# Patient Record
Sex: MALE | Race: WHITE | NOT HISPANIC OR LATINO | Employment: PART TIME | URBAN - NONMETROPOLITAN AREA
[De-identification: names, ages, dates, MRNs, and addresses within clinical notes are randomized per-mention and may not be internally consistent; named-entity substitution may affect disease eponyms.]

---

## 2017-03-14 ENCOUNTER — OFFICE VISIT - GICH (OUTPATIENT)
Dept: INTERNAL MEDICINE | Facility: OTHER | Age: 37
End: 2017-03-14

## 2017-03-14 ENCOUNTER — HISTORY (OUTPATIENT)
Dept: INTERNAL MEDICINE | Facility: OTHER | Age: 37
End: 2017-03-14

## 2017-03-14 DIAGNOSIS — Z79.899 OTHER LONG TERM (CURRENT) DRUG THERAPY: ICD-10-CM

## 2017-03-14 DIAGNOSIS — K21.9 GASTRO-ESOPHAGEAL REFLUX DISEASE WITHOUT ESOPHAGITIS: ICD-10-CM

## 2017-03-14 DIAGNOSIS — T75.3XXD MOTION SICKNESS, SUBSEQUENT ENCOUNTER: ICD-10-CM

## 2017-03-14 DIAGNOSIS — J30.81 ALLERGIC RHINITIS DUE TO ANIMAL HAIR AND DANDER: ICD-10-CM

## 2017-03-14 DIAGNOSIS — K51.919 ULCERATIVE COLITIS, UNSPECIFIED WITH UNSPECIFIED COMPLICATIONS (CODE): ICD-10-CM

## 2017-03-14 DIAGNOSIS — G47.9 SLEEP DISORDER: ICD-10-CM

## 2017-03-14 DIAGNOSIS — K51.00 ULCERATIVE CHRONIC PANCOLITIS WITHOUT COMPLICATIONS (H): ICD-10-CM

## 2017-03-14 DIAGNOSIS — F40.10 SOCIAL PHOBIA: ICD-10-CM

## 2017-03-14 DIAGNOSIS — I10 ESSENTIAL (PRIMARY) HYPERTENSION: ICD-10-CM

## 2017-03-14 DIAGNOSIS — F90.2 ATTENTION-DEFICIT HYPERACTIVITY DISORDER, COMBINED TYPE: ICD-10-CM

## 2017-03-14 DIAGNOSIS — J30.1 ALLERGIC RHINITIS DUE TO POLLEN: ICD-10-CM

## 2017-03-14 DIAGNOSIS — F33.42 MAJOR DEPRESSIVE DISORDER, RECURRENT, IN FULL REMISSION (H): ICD-10-CM

## 2017-03-14 DIAGNOSIS — J30.89 OTHER ALLERGIC RHINITIS: ICD-10-CM

## 2017-03-14 DIAGNOSIS — G43.009 MIGRAINE WITHOUT AURA AND WITHOUT STATUS MIGRAINOSUS, NOT INTRACTABLE: ICD-10-CM

## 2017-03-14 ASSESSMENT — ANXIETY QUESTIONNAIRES
5. BEING SO RESTLESS THAT IT IS HARD TO SIT STILL: NOT AT ALL
GAD7 TOTAL SCORE: 0
3. WORRYING TOO MUCH ABOUT DIFFERENT THINGS: NOT AT ALL
6. BECOMING EASILY ANNOYED OR IRRITABLE: NOT AT ALL
1. FEELING NERVOUS, ANXIOUS, OR ON EDGE: NOT AT ALL
4. TROUBLE RELAXING: NOT AT ALL
2. NOT BEING ABLE TO STOP OR CONTROL WORRYING: NOT AT ALL
7. FEELING AFRAID AS IF SOMETHING AWFUL MIGHT HAPPEN: NOT AT ALL

## 2017-03-14 ASSESSMENT — PATIENT HEALTH QUESTIONNAIRE - PHQ9: SUM OF ALL RESPONSES TO PHQ QUESTIONS 1-9: 0

## 2017-04-09 ENCOUNTER — HISTORY (OUTPATIENT)
Dept: EMERGENCY MEDICINE | Facility: OTHER | Age: 37
End: 2017-04-09

## 2017-04-21 ENCOUNTER — COMMUNICATION - GICH (OUTPATIENT)
Dept: PHARMACY | Facility: OTHER | Age: 37
End: 2017-04-21

## 2017-05-08 ENCOUNTER — HISTORY (OUTPATIENT)
Dept: FAMILY MEDICINE | Facility: OTHER | Age: 37
End: 2017-05-08

## 2017-05-08 ENCOUNTER — OFFICE VISIT - GICH (OUTPATIENT)
Dept: FAMILY MEDICINE | Facility: OTHER | Age: 37
End: 2017-05-08

## 2017-05-08 DIAGNOSIS — Z00.00 ENCOUNTER FOR GENERAL ADULT MEDICAL EXAMINATION WITHOUT ABNORMAL FINDINGS: ICD-10-CM

## 2017-05-08 LAB
BACTERIA URINE: NORMAL BACTERIA/HPF
BILIRUB UR QL: NEGATIVE
CLARITY, URINE: CLEAR CLARITY
COLOR UR: YELLOW COLOR
EPITHELIAL CELLS: NORMAL EPI/HPF
GLUCOSE URINE: NEGATIVE MG/DL
KETONES UR QL: NEGATIVE MG/DL
LEUKOCYTE ESTERASE URINE: NEGATIVE
NITRITE UR QL STRIP: NEGATIVE
OCCULT BLOOD,URINE - HISTORICAL: NEGATIVE
PH UR: 6 [PH]
PROTEIN QUALITATIVE,URINE - HISTORICAL: ABNORMAL MG/DL
RBC - HISTORICAL: NORMAL /HPF
SP GR UR STRIP: >=1.03
UROBILINOGEN,QUALITATIVE - HISTORICAL: NORMAL EU/DL
WBC - HISTORICAL: NORMAL /HPF

## 2017-05-08 ASSESSMENT — PATIENT HEALTH QUESTIONNAIRE - PHQ9: SUM OF ALL RESPONSES TO PHQ QUESTIONS 1-9: 0

## 2017-05-26 ENCOUNTER — COMMUNICATION - GICH (OUTPATIENT)
Dept: INTERNAL MEDICINE | Facility: OTHER | Age: 37
End: 2017-05-26

## 2017-05-26 DIAGNOSIS — K51.919 ULCERATIVE COLITIS, UNSPECIFIED WITH UNSPECIFIED COMPLICATIONS (CODE): ICD-10-CM

## 2017-06-09 ENCOUNTER — COMMUNICATION - GICH (OUTPATIENT)
Dept: INTERNAL MEDICINE | Facility: OTHER | Age: 37
End: 2017-06-09

## 2017-06-09 DIAGNOSIS — F40.10 SOCIAL PHOBIA: ICD-10-CM

## 2017-06-09 DIAGNOSIS — Z79.899 OTHER LONG TERM (CURRENT) DRUG THERAPY: ICD-10-CM

## 2017-06-09 DIAGNOSIS — F90.2 ATTENTION-DEFICIT HYPERACTIVITY DISORDER, COMBINED TYPE: ICD-10-CM

## 2017-06-23 ENCOUNTER — HISTORY (OUTPATIENT)
Dept: INTERNAL MEDICINE | Facility: OTHER | Age: 37
End: 2017-06-23

## 2017-06-23 ENCOUNTER — OFFICE VISIT - GICH (OUTPATIENT)
Dept: INTERNAL MEDICINE | Facility: OTHER | Age: 37
End: 2017-06-23

## 2017-06-23 DIAGNOSIS — K51.919 ULCERATIVE COLITIS, UNSPECIFIED WITH UNSPECIFIED COMPLICATIONS (CODE): ICD-10-CM

## 2017-06-23 DIAGNOSIS — F90.2 ATTENTION-DEFICIT HYPERACTIVITY DISORDER, COMBINED TYPE: ICD-10-CM

## 2017-06-23 DIAGNOSIS — F40.10 SOCIAL PHOBIA: ICD-10-CM

## 2017-06-23 DIAGNOSIS — F41.0 PANIC DISORDER WITHOUT AGORAPHOBIA: ICD-10-CM

## 2017-06-23 DIAGNOSIS — Z79.899 OTHER LONG TERM (CURRENT) DRUG THERAPY: ICD-10-CM

## 2017-06-23 DIAGNOSIS — L65.9 NONSCARRING HAIR LOSS: ICD-10-CM

## 2017-06-23 DIAGNOSIS — G43.009 MIGRAINE WITHOUT AURA AND WITHOUT STATUS MIGRAINOSUS, NOT INTRACTABLE: ICD-10-CM

## 2017-06-23 DIAGNOSIS — T75.3XXD MOTION SICKNESS, SUBSEQUENT ENCOUNTER: ICD-10-CM

## 2017-06-23 ASSESSMENT — ANXIETY QUESTIONNAIRES
7. FEELING AFRAID AS IF SOMETHING AWFUL MIGHT HAPPEN: NOT AT ALL
6. BECOMING EASILY ANNOYED OR IRRITABLE: NOT AT ALL
4. TROUBLE RELAXING: NOT AT ALL
5. BEING SO RESTLESS THAT IT IS HARD TO SIT STILL: NOT AT ALL
1. FEELING NERVOUS, ANXIOUS, OR ON EDGE: NOT AT ALL
2. NOT BEING ABLE TO STOP OR CONTROL WORRYING: NOT AT ALL
3. WORRYING TOO MUCH ABOUT DIFFERENT THINGS: NOT AT ALL
GAD7 TOTAL SCORE: 0

## 2017-06-23 ASSESSMENT — PATIENT HEALTH QUESTIONNAIRE - PHQ9: SUM OF ALL RESPONSES TO PHQ QUESTIONS 1-9: 0

## 2017-11-08 ENCOUNTER — HISTORY (OUTPATIENT)
Dept: EMERGENCY MEDICINE | Facility: OTHER | Age: 37
End: 2017-11-08

## 2017-12-28 NOTE — TELEPHONE ENCOUNTER
Patient Information     Patient Name MRN Kike Faria [id number] Male 1980      Telephone Encounter by Liliane Adams RN at 2017  1:57 PM     Author:  Liliane Adams RN Service:  (none) Author Type:  NURS- Registered Nurse     Filed:  2017  2:05 PM Encounter Date:  2017 Status:  Signed     :  Liliane Adams RN (NURS- Registered Nurse)            Needs appointment for refill of his adderall. Has appointment 17.  Patient states he is working 50 hour weeks at a new job, and it was only 2 weeks from now that he could get in to see Jarad Salcido MD  He is requesting enough to get by until then.  Also wondering if his father could pick it up, if it is ok'd, as he works from 0430 until 1900 daily.  Pt requests physician consideration and a callback today please   Unable to complete prescription refill per RN Medication Refill Policy.................... LILIANE ADAMS RN ....................  2017   1:58 PM

## 2017-12-28 NOTE — PROGRESS NOTES
Patient Information     Patient Name MRN Kike Faria [id number] Male 1980      Progress Notes by Jarad Salcido MD at 2017  3:00 PM     Author:  Jarad Salcido MD Service:  (none) Author Type:  Physician     Filed:  2017  4:54 PM Encounter Date:  2017 Status:  Signed     :  Jarad Salcido MD (Physician)            Nursing Notes:   Mariela Puentes  2017  3:09 PM  Signed  Patient presents to the clinic for medication management, last administration of Adderall was around 0720 today.    Mariela PuentesMARIA E        2017 2:44 PM    Kike Hess presents to clinic today for:   Chief Complaint    Patient presents with      Medication Management     HPI: Mr. Hess is a 37 y.o. male who presents today for evaluation of above.     (F90.2) Attention deficit hyperactivity disorder (ADHD), combined type  (primary encounter diagnosis)  (F40.10) Social phobia  (Z79.899) Controlled substance agreement signed - 2016  (L65.9) Hair loss  (F41.0) Panic attacks  (K51.919) Ulcerative colitis, quiescent, unspecified complication  (G43.009) Migraine without aura and without status migrainosus, not intractable  (T75.3XXD) Motion sickness, subsequent encounter     Patient presents for follow-up of multiple issues.  He has a diagnosis of ADHD.  He has been using dextroamphetamine for quite some time.  This has allowed him to work full-time and actually he is working 50-60 hours a week at this time.  He's been able to maintain a job and busy schedule because of his medication use.    Patient also has some issues with social phobia and panic attacks.  These are been quite well-controlled with his Ativan.  He tries to limit use and does not drive after using them.  [facility] website reviewed.  No abnormal findings noted.  Prescription reprinted ×3 months.    Hair loss, has to pay cash for his Propecia tablet.  He would like a refill of these.    Ulcerative colitis,  relatively well control at this time.  Continues to use his Ms. Sallie on a regular basis.  Intermittently needing Medrol.  Needs refills today.    Migraine headaches, relatively well controlled.  Needs refills of his Imitrex.    Motion sickness, uses scopolamine patches intermittently.  Would like a refill again today.    Mr. Thomass Body mass index is 26.89 kg/(m^2). This is out of the normal range for a 37 y.o. Normal range for ages 18+ is between 18.5 and 24.9. To lose weight we reviewed risks and benefits of appropriate options such as diet, exercise, and medications. Patient's strategy will be  self-directed nutrition plan and self-directed exercise program   BP Readings from Last 1 Encounters:06/23/17 : 124/70  Mr. Lemos blood pressure is out of the normal range for adults. Per JNC-8 guidelines normal adult blood pressure is < 120/80, pre-hypertensive is between 120/80 and 139/89, and hypertension is 140/90 or greater. Risks of hypertension were discussed. Patient's strategy will be weight loss, increased activity and reduced salt intake    Functional Capacity: > 4 METS.   Reports that he can climb a flight of stairs without any chest pain/heaviness or shortness of breath.   Patient reports no current symptoms of fevers, chills, nausea/vomiting.   No cough. No shortness of breath.   No change in bowel/bladder habits. No melena, hematochezia. No Hematuria.   No rashes. No palpitations.  No orthopnea/paroxysmal nocturnal dyspnea   No vision or hearing issues.   No significant mood issues   No bruising.     ANNMARIE:  ANNMARIE-7 ANXIETY SCREENING 3/14/2017 6/23/2017   ANNMARIE date (doc flow) 3/14/2017 6/23/2017   Nervous, anxious 0 0   Cannot stop worrying 0 0   Worry about different things 0 0   Cannot relax 0 0   Feeling restless 0 0   Easily annoyed/irritated 0 0   Afraid of awful event 0 0   Score 0 0   Severity none none       PHQ9:  PHQ Depression Screening 5/8/2017 6/23/2017   Date of PHQ exam (doc flow)  5/8/2017 6/23/2017   1. Lack of interest/pleasure 0 - Not at all 0 - Not at all   2. Feeling down/depressed 0 - Not at all 0 - Not at all   PHQ-2 TOTAL SCORE 0 0   3. Trouble sleeping 0 - Not at all 0 - Not at all   4. Decreased energy 0 - Not at all 0 - Not at all   5. Appetite change 0 - Not at all 0 - Not at all   6. Feelings of failure 0 - Not at all 0 - Not at all   7. Trouble concentrating 0 - Not at all 0 - Not at all   8. Activity level 0 - Not at all 0 - Not at all   9. Hurting yourself 0 - Not at all 0 - Not at all   PHQ-9 TOTAL SCORE 0 0   PHQ-9 Severity Level none none   Functional Impairment - not difficult at all        I have personally reviewed the past medical history, past surgical history, medications, allergies, family and social history as listed below, on 6/23/2017.    Patient Active Problem List      Diagnosis Date Noted     Attention deficit hyperactivity disorder (ADHD), combined type 12/01/2015     Anxiety 12/01/2015     Controlled substance agreement signed - 12/14/2016 12/01/2015     Migraine without aura and without status migrainosus, not intractable 06/19/2015     Sprain of left thumb 08/13/2014     Motion sickness 08/13/2014     Hypertension 08/13/2014     Social phobia 12/19/2013     Seasonal allergic rhinitis 12/10/2013     Dust allergy 12/10/2013     Allergic rhinitis due to cat hair 12/10/2013     GERD (gastroesophageal reflux disease) 12/10/2013     Sleeping difficulties 12/10/2013     Major depression - Followed by [facility] 12/10/2013     Hyperglycemia 12/10/2013     Hair loss 12/10/2013     Medial epicondylitis of elbow 12/10/2013     Lateral epicondylitis  of elbow 12/10/2013     Elevated liver enzymes 12/10/2013     Ulcerative colitis, unspecified 08/09/2007     Past Medical History:     Diagnosis  Date     Allergic rhinitis due to cat hair 12/10/2013     Depression 12/10/2013    Previously followed with Psychiatry - was on Remeron, Adderall, Lexapro in  the past. Awaiting to re-establish psychiatry care again.       Dust allergy 12/10/2013     GERD (gastroesophageal reflux disease) 12/10/2013     Migraine headache 12/10/2013     Seasonal allergic rhinitis 12/10/2013     Sleeping difficulties 12/10/2013     ULCERATIVE COLITIS 8/9/2007     Past Surgical History:      Procedure  Laterality Date     APPENDECTOMY  2/4/13    Dr. Givens/[facility]       Current Outpatient Prescriptions       Medication  Sig Dispense Refill     amitriptyline (ELAVIL) 25 mg tablet Take 2 tablets by mouth at bedtime. 60 tablet 11     amLODIPine (NORVASC) 5 mg tablet Take 1 tablet by mouth once daily. 30 tablet 11     dextroamphetamine-amphetamine (ADDERALL XR) 30 mg Extended-Release capsule Take 1 capsule by mouth once daily  - for on/after 08/18/17 31 capsule 0     dextroamphetamine-amphetamine (ADDERALL XR) 30 mg Extended-Release capsule Take 1 capsule by mouth once daily  -- for on or after 07/21/17 31 capsule 0     dextroamphetamine-amphetamine (ADDERALL XR) 30 mg Extended-Release capsule Take 1 capsule by mouth once daily  - for on or after 6/23/2017 31 capsule 0     escitalopram oxalate (LEXAPRO) 10 mg tablet Take 1 tablet by mouth once daily. 30 tablet 11     finasteride (PROPECIA) 1 mg tablet Take 1 tablet by mouth every morning. 90 tablet 3     LORazepam (ATIVAN) 0.5 mg tab Take 1 tablet by mouth 2 times daily if needed for Anxiety. - for on or after 6/23/2017  - 6 month supply 60 tablet 5     losartan (COZAAR) 50 mg tablet Take 1 tablet by mouth once daily. 30 tablet 11     mesalamine 400 mg DELAYED RELEASE (DELZICOL) 400 mg cpDR capsule TAKE TWO CAPSULES BY MOUTH 3 TO 4 TIMES DAILY 240 capsule 11     methylPREDNISolone (MEDROL) 4 mg tablet TAKE AS DIRECTED FOR 10 DAYS FOR ULCERATIVE COLITIS FLAIR. REPEAT EVERY 4 WEEKS IF NEEDED. MAXIMUM OF 40 TABLETS MONTHLY 40 tablet 5     mometasone (NASONEX) (50 mcg each actuation) nasal spray Inhale 1 Spray into both nostrils once daily. - use  generic covered nasal steroid 1 canister 11     omeprazole (PRILOSEC) 20 mg Delayed-Release capsule Take 1 capsule by mouth once daily before a meal. Take 30 to 60 minutes prior to 1st meal of the day 30 capsule 11     scopolamine 1.5mg (TRANSDERM SCOP) 1.5 mg (1 mg over 3 days) patch Apply 1 Patch on dry, clean, hairless skin every 72 hours. For Motion Sickness 10 Patch 11     SUMAtriptan (IMITREX) 50 mg tablet TAKE 1/2 TO 1 TABLET BY MOUTH EVERY 2 HOURS AS NEEDED FOR FOR MIGRAINE max DOSE 4 tablets PER 24 hours 15 tablet 11     Allergies      Allergen   Reactions     Cats (Fur, Dander, Saliva)  Runny Nose     Itchy eyes and hand swelling        Lisinopril  Cough     Other [Unlisted Allergen (Include Detail In Comments)]  Angioedema     Parsnip      Family History       Problem   Relation Age of Onset     Psychiatric illness        Aunt - suicide 2013       Cancer-colon  Paternal Grandmother      Cancer-colon  Maternal Grandmother      Family Status     Relation  Status     Mother Alive    obesity      Father Alive    DM2 - testicular CA, CAD s/p MI at about 58      Sister Alive     Paternal Grandfather Alive    CAD s/p MI      Paternal Aunt     suicide 2013      Paternal Grandmother Alive    colon CA      Maternal Grandmother Alive    colon CA      Social History     Social History        Marital status:  Single     Spouse name: N/A     Number of children:  N/A     Years of education:  N/A     Social History Main Topics         Smoking status:   Never Smoker     Smokeless tobacco:   Never Used     Alcohol use   0.0 oz/week     0 Standard drinks or equivalent per week        Comment: ocassionally       Drug use:   No     Sexual activity:   Not on file     Other Topics  Concern     Not on file      Social History Narrative     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     Recently was doing [employer] work     - minimal work as of  "February 2014, for past 1 month.    Moved back to [facility] in about November 2012.         For family mental health issues, he notes that an aunt committed suicide in    October 2013 and 3 maternal aunts and his mother are on antidepressants. His    maternal grandmother was diagnosed with schizoaffective disorder, as was a    male cousin. Attention deficit hyperactivity disorder was significant for an    uncle and the client's cousins.      Pertinent ROS was performed and was negative as noted in HPI above.     EXAM:   Vitals:     06/23/17 1446   BP: 124/70   Pulse: 88   Weight: 85 kg (187 lb 6 oz)   Height: 1.778 m (5' 10\")     BP Readings from Last 3 Encounters:    06/23/17 124/70   05/08/17 124/98   04/09/17 (!) 152/111     Wt Readings from Last 3 Encounters:    06/23/17 85 kg (187 lb 6 oz)   05/08/17 83.9 kg (185 lb)   04/09/17 86.2 kg (190 lb)     Estimated body mass index is 26.89 kg/(m^2) as calculated from the following:    Height as of this encounter: 1.778 m (5' 10\").    Weight as of this encounter: 85 kg (187 lb 6 oz).     EXAM:  Constitutional: Pleasant, alert, appropriate appearance for age. No acute distress  Lymphatic Exam: Non-palpable nodes in neck, clavicular regions  Pulmonary: Lungs are clear to auscultation bilaterally, without wheezes or crackles  Cardiovascular Exam: regular rate and rhythm, no pedal edema  Gastrointestinal Exam: Soft, non-tender, non-distended, positive bowel sounds  Integument: No abnormal rashes, sores, or ulcerations noted  Neurologic Exam: CN 2-12 grossly intact Nonfocal; symmetric DTRs, normal gross motor movement, tone, and coordination. No tremor.  Sensation intact to light touch in upper and lower extremities  Musculoskeletal Exam: Moves upper and lower extremities symmetrically, No focal weakness  Gait and station appear grossly normal  Psychiatric Exam: Awake and Alert, Affect and mood appropriate  Speech is fluent, Thought process is " normal    INVESTIGATIONS:  Results for orders placed or performed in visit on 05/08/17      URINALYSIS W REFLEX MICROSCOPIC IF POSITIVE      Result  Value Ref Range    COLOR                     Yellow Yellow Color    CLARITY                   Clear Clear Clarity    SPECIFIC GRAVITY,URINE    >=1.030 (A) 1.010, 1.015, 1.020, 1.025                    PH,URINE                  6.0 6.0, 7.0, 8.0, 5.5, 6.5, 7.5, 8.5                    UROBILINOGEN,QUALITATIVE  Normal Normal EU/dl    PROTEIN, URINE Trace (A) Negative mg/dL    GLUCOSE, URINE Negative Negative mg/dL    KETONES,URINE             Negative Negative mg/dL    BILIRUBIN,URINE           Negative Negative                    OCCULT BLOOD,URINE        Negative Negative                    NITRITE                  Negative Negative                    LEUKOCYTE ESTERASE        Negative Negative                   URINALYSIS MICROSCOPIC      Result  Value Ref Range    RBC 0-2 0-2, None Seen /HPF    WBC 3-5 0-2, 3-5, None Seen /HPF    BACTERIA                  Few None Seen, Rare, Occasional, Few Bacteria/HPF    EPITHELIAL CELLS          None Seen None Seen, Few Epi/HPF       ASSESSMENT AND PLAN:  Kike was seen today for medication management.    Diagnoses and all orders for this visit:    Attention deficit hyperactivity disorder (ADHD), combined type  -     dextroamphetamine-amphetamine (ADDERALL XR) 30 mg Extended-Release capsule; Take 1 capsule by mouth once daily  - for on/after 08/18/17  -     dextroamphetamine-amphetamine (ADDERALL XR) 30 mg Extended-Release capsule; Take 1 capsule by mouth once daily  -- for on or after 07/21/17  -     dextroamphetamine-amphetamine (ADDERALL XR) 30 mg Extended-Release capsule; Take 1 capsule by mouth once daily  - for on or after 6/23/2017    Social phobia  -     dextroamphetamine-amphetamine (ADDERALL XR) 30 mg Extended-Release capsule; Take 1 capsule by mouth once daily  - for on/after 08/18/17  -      dextroamphetamine-amphetamine (ADDERALL XR) 30 mg Extended-Release capsule; Take 1 capsule by mouth once daily  -- for on or after 07/21/17  -     dextroamphetamine-amphetamine (ADDERALL XR) 30 mg Extended-Release capsule; Take 1 capsule by mouth once daily  - for on or after 6/23/2017  -     LORazepam (ATIVAN) 0.5 mg tab; Take 1 tablet by mouth 2 times daily if needed for Anxiety. - for on or after 6/23/2017  - 6 month supply    Controlled substance agreement signed - 12/14/2016  -     dextroamphetamine-amphetamine (ADDERALL XR) 30 mg Extended-Release capsule; Take 1 capsule by mouth once daily  - for on/after 08/18/17  -     dextroamphetamine-amphetamine (ADDERALL XR) 30 mg Extended-Release capsule; Take 1 capsule by mouth once daily  -- for on or after 07/21/17  -     dextroamphetamine-amphetamine (ADDERALL XR) 30 mg Extended-Release capsule; Take 1 capsule by mouth once daily  - for on or after 6/23/2017  -     LORazepam (ATIVAN) 0.5 mg tab; Take 1 tablet by mouth 2 times daily if needed for Anxiety. - for on or after 6/23/2017  - 6 month supply    Hair loss  -     finasteride (PROPECIA) 1 mg tablet; Take 1 tablet by mouth every morning.    Panic attacks  -     LORazepam (ATIVAN) 0.5 mg tab; Take 1 tablet by mouth 2 times daily if needed for Anxiety. - for on or after 6/23/2017  - 6 month supply    Ulcerative colitis, quiescent, unspecified complication  -     methylPREDNISolone (MEDROL) 4 mg tablet; TAKE AS DIRECTED FOR 10 DAYS FOR ULCERATIVE COLITIS FLAIR. REPEAT EVERY 4 WEEKS IF NEEDED. MAXIMUM OF 40 TABLETS MONTHLY    Migraine without aura and without status migrainosus, not intractable  -     SUMAtriptan (IMITREX) 50 mg tablet; TAKE 1/2 TO 1 TABLET BY MOUTH EVERY 2 HOURS AS NEEDED FOR FOR MIGRAINE max DOSE 4 tablets PER 24 hours    Motion sickness, subsequent encounter  -     scopolamine 1.5mg (TRANSDERM SCOP) 1.5 mg (1 mg over 3 days) patch; Apply 1 Patch on dry, clean, hairless skin every 72 hours. For  Motion Sickness    lab results and schedule of future lab studies reviewed with patient, reviewed diet, exercise and weight control    -- Expected clinical course discussed   -- Medications and their side effects discussed    Urine toxicology screening obtained 12/14/2016, no abnormal findings noted.  Controlled substance agreement is up-to-date.    The ASCVD Risk score (Che TREJO Jr, et al., 2013) failed to calculate for the following reasons:    The 2013 ASCVD risk score is only valid for ages 40 to 79    Kkie is also recommended to eat a heart-healthy diet, do regular aerobic exercises, maintain a desirable body weight, and avoid tobacco products. These recommendations are from the American Heart Association (AHA) which stresses the importance of lifestyle changes to lower cardiovascular disease risk.     Return in about 3 months (around 9/23/2017) for -- medication refills.    Patient Instructions   Orthopedic referral information -- call and schedule appointment with orthopedics if your left knee begins to bother him more.  If a referral is needed, call me and we can send in an order.    Call [phone] or [phone])  Otherwise -- [phone]  - or -  [phone]     -- To schedule an appointment with the orthopedic clinic:   -- Dr. Galeana, Dr. Ramírez      Medications refilled.    Return in about 3 months for Pain Management appointment and medication refills (BEFORE YOU RUN OUT OF Controlled Medications), or sooner as needed for follow-up with Dr. Salcido.     Clinic : [phone]  Appointment line: [phone]      Jarad Salcido MD

## 2017-12-28 NOTE — PATIENT INSTRUCTIONS
Patient Information     Patient Name MRN Kike Faria [id number] Male 1980      Patient Instructions by Jarad Salcido MD at 2017  3:00 PM     Author:  Jarad Salcido MD  Service:  (none) Author Type:  Physician     Filed:  2017  3:14 PM  Encounter Date:  2017 Status:  Addendum     :  Jraad Salcido MD (Physician)        Related Notes: Original Note by Jarad Salcido MD (Physician) filed at 2017  3:11 PM            Orthopedic referral information -- call and schedule appointment with orthopedics if your left knee begins to bother him more.  If a referral is needed, call me and we can send in an order.    Call [phone] or [phone])  Otherwise -- [phone]  - or -  [phone]     -- To schedule an appointment with the orthopedic clinic:   -- Dr. Galeana, Dr. Ramírez      Medications refilled.    Return in about 3 months for Pain Management appointment and medication refills (BEFORE YOU RUN OUT OF Controlled Medications), or sooner as needed for follow-up with Dr. Salcido.     Clinic : [phone]  Appointment line: [phone]

## 2017-12-30 NOTE — NURSING NOTE
Patient Information     Patient Name MRN Kike Faria [id number] Male 1980      Nursing Note by Mariela Puentes at 2017  3:00 PM     Author:  Mariela Puentes Service:  (none) Author Type:  (none)     Filed:  2017  3:09 PM Encounter Date:  2017 Status:  Signed     :  Mariela Puentes            Patient presents to the clinic for medication management, last administration of Adderall was around 0720 today.    Mariela Puentes LPN        2017 2:44 PM

## 2018-01-03 ENCOUNTER — HISTORY (OUTPATIENT)
Dept: INTERNAL MEDICINE | Facility: OTHER | Age: 38
End: 2018-01-03

## 2018-01-03 ENCOUNTER — OFFICE VISIT - GICH (OUTPATIENT)
Dept: INTERNAL MEDICINE | Facility: OTHER | Age: 38
End: 2018-01-03

## 2018-01-03 DIAGNOSIS — K51.00 ULCERATIVE CHRONIC PANCOLITIS WITHOUT COMPLICATIONS (H): ICD-10-CM

## 2018-01-03 DIAGNOSIS — J30.81 ALLERGIC RHINITIS DUE TO ANIMAL HAIR AND DANDER: ICD-10-CM

## 2018-01-03 DIAGNOSIS — F90.2 ATTENTION-DEFICIT HYPERACTIVITY DISORDER, COMBINED TYPE: ICD-10-CM

## 2018-01-03 DIAGNOSIS — G47.9 SLEEP DISORDER: ICD-10-CM

## 2018-01-03 DIAGNOSIS — J30.89 OTHER ALLERGIC RHINITIS: ICD-10-CM

## 2018-01-03 DIAGNOSIS — K21.9 GASTRO-ESOPHAGEAL REFLUX DISEASE WITHOUT ESOPHAGITIS: ICD-10-CM

## 2018-01-03 DIAGNOSIS — F40.10 SOCIAL PHOBIA: ICD-10-CM

## 2018-01-03 DIAGNOSIS — F41.0 PANIC DISORDER WITHOUT AGORAPHOBIA: ICD-10-CM

## 2018-01-03 DIAGNOSIS — Z79.899 OTHER LONG TERM (CURRENT) DRUG THERAPY: ICD-10-CM

## 2018-01-03 DIAGNOSIS — I10 ESSENTIAL (PRIMARY) HYPERTENSION: ICD-10-CM

## 2018-01-03 DIAGNOSIS — F33.42 MAJOR DEPRESSIVE DISORDER, RECURRENT, IN FULL REMISSION (H): ICD-10-CM

## 2018-01-03 DIAGNOSIS — J30.1 ALLERGIC RHINITIS DUE TO POLLEN: ICD-10-CM

## 2018-01-03 ASSESSMENT — ANXIETY QUESTIONNAIRES
1. FEELING NERVOUS, ANXIOUS, OR ON EDGE: NOT AT ALL
GAD7 TOTAL SCORE: 0
7. FEELING AFRAID AS IF SOMETHING AWFUL MIGHT HAPPEN: NOT AT ALL
3. WORRYING TOO MUCH ABOUT DIFFERENT THINGS: NOT AT ALL
2. NOT BEING ABLE TO STOP OR CONTROL WORRYING: NOT AT ALL
6. BECOMING EASILY ANNOYED OR IRRITABLE: NOT AT ALL
4. TROUBLE RELAXING: NOT AT ALL
5. BEING SO RESTLESS THAT IT IS HARD TO SIT STILL: NOT AT ALL

## 2018-01-03 ASSESSMENT — PATIENT HEALTH QUESTIONNAIRE - PHQ9: SUM OF ALL RESPONSES TO PHQ QUESTIONS 1-9: 0

## 2018-01-03 NOTE — PATIENT INSTRUCTIONS
Patient Information     Patient Name MRN Kike Faria [id number] Male 1980      Patient Instructions by Jarad Salcido MD at 3/14/2017  3:50 PM     Author:  Jarad Salcido MD Service:  (none) Author Type:  Physician     Filed:  3/14/2017  4:05 PM Encounter Date:  3/14/2017 Status:  Signed     :  Jarad Salcido MD (Physician)            Medications refilled.     Glad you doing well with current medications.    Return in approximately 3 month(s), or sooner as needed for follow-up with Dr. Salcido.    Clinic : [phone]  Appointment line: [phone]

## 2018-01-03 NOTE — PROGRESS NOTES
Patient Information     Patient Name MRN Kike Faria [id number] Male 1980      Progress Notes by Jarad Salcido MD at 3/14/2017  3:50 PM     Author:  Jarad Salcido MD Service:  (none) Author Type:  Physician     Filed:  3/14/2017  6:55 PM Encounter Date:  3/14/2017 Status:  Signed     :  Jarad Salcido MD (Physician)            Nursing Notes:   Mariela Puentes  3/14/2017  4:00 PM  Signed  Patient presents to the clinic for medication management.  Last administration of Adderall was yesterday, last Ativan was within the past 1-2 hours.    As a proxy delegate, I have queried the [place] and/or [place] Prescription Monitoring Program for this patient and provided the clinician with a printed copy of the report for the preceding 12 months.    Mariela Puentes.......3/14/2017  3:51 PM    Kike Hess presents to clinic today for:   Chief Complaint    Patient presents with      Medication Management     HPI: Mr. Hess is a 36 y.o. male who presents today for evaluation of above.     (F90.2) Attention deficit hyperactivity disorder (ADHD), combined type  (primary encounter diagnosis)  (G47.9) Sleeping difficulties  (I10) Hypertension  (F40.10) Social phobia  (Z79.899) Controlled substance agreement signed - 2016  (F33.42) Recurrent major depressive disorder, in full remission (HC)  (K51.00) Ulcerative pancolitis without complication (HC)  (G43.009) Migraine without aura and without status migrainosus, not intractable  (T75.3XXD) Motion sickness, subsequent encounter  (K21.9) Gastroesophageal reflux disease without esophagitis  (K51.919) Ulcerative colitis, quiescent, unspecified complication  (J30.1) Seasonal allergic rhinitis due to pollen  (J30.89) Dust allergy  (J30.81) Allergic rhinitis due to cat hair     Patient presents for follow-up of multiple issues.  He needs numerous medications refilled today.  Last urine drug screen 2016 - noted as appropriate.  Controlled  substance agreement is currently up-to-date.  [facility] website reviewed.  No abnormal findings noted.  Proper medication use and misuse reviewed with patient.  He has been using medications appropriately.  Reports that occasionally he will go on a medication holiday and not take his Adderall for 1 or 2 days on the weekend if he is not working.  States that this way it is more effective for him when he does use it during the week.    Sleep difficulties, ongoing.  Needs refills of his medications.    Ativan, still intermittently using.  Needs refills.    Ulcerative colitis, doing well.  Has needed to use a couple doses of methylprednisolone in the past few months due to mild outbreaks of his ulcerative colitis issues.  States that they resolve quickly however.  Needs refills today.    Hypertension, currently well controlled.  Tolerating medication.  Refills today.    Motion sickness, refill scopolamine patches.  Still uses intermittently.    Reflex.  Reports stable.  Needs refills of omeprazole.    Allergic rhinitis, would like refills of Nasonex or similar nasal steroid spray.  Refills sent to pharmacy.    Mr. Hess's Body mass index is 27.41 kg/(m^2). This is out of the normal range for a 36 y.o. Normal range for ages 18-64 is between 18.5 and 24.9; normal range for ages 65+ is 23-30. To lose weight we reviewed risks and benefits of appropriate options such as diet, exercise, and medications. Patient's strategy will be  self-directed nutrition plan and self-directed exercise program   BP Readings from Last 1 Encounters:03/14/17 : 136/84  Mr. Hess's blood pressure is out of the normal range for adults. Per JNC-8 guidelines normal adult blood pressure is < 120/80, pre-hypertensive is between 120/80 and 139/89, and hypertension is 140/90 or greater. Risks of hypertension were discussed. Patient's strategy will be weight loss, increased activity and reduced salt intake    Functional Capacity: > 4 METS.   Reports  that he can climb a flight of stairs without any chest pain/heaviness or shortness of breath.   Patient reports no current symptoms of fevers, chills, nausea/vomiting.   No cough. No shortness of breath.   No change in bowel/bladder habits. No melena, hematochezia. No Hematuria.   No rashes. No palpitations.  No orthopnea/paroxysmal nocturnal dyspnea   No vision or hearing issues.   + ADHD is stable.   No bruising.     ANNMARIE:  ANNMARIE-7 ANXIETY SCREENING 3/14/2017   ANNMARIE date (doc flow) 3/14/2017   Nervous, anxious 0   Cannot stop worrying 0   Worry about different things 0   Cannot relax 0   Feeling restless 0   Easily annoyed/irritated 0   Afraid of awful event 0   Score 0   Severity none       PHQ9:  PHQ Depression Screening 12/14/2016 3/14/2017   Date of PHQ exam (doc flow) 12/14/2016 3/14/2017   1. Lack of interest/pleasure 1 - Several days 0 - Not at all   2. Feeling down/depressed 1 - Several days 0 - Not at all   PHQ-2 TOTAL SCORE 2 0   3. Trouble sleeping 1 - Several days 0 - Not at all   4. Decreased energy 2 - More than half the days 0 - Not at all   5. Appetite change 2 - More than half the days 0 - Not at all   6. Feelings of failure 1 - Several days 0 - Not at all   7. Trouble concentrating 1 - Several days 0 - Not at all   8. Activity level 0 - Not at all 0 - Not at all   9. Hurting yourself 0 - Not at all 0 - Not at all   PHQ-9 TOTAL SCORE 9 0   PHQ-9 Severity Level mild none   Functional Impairment somewhat difficult not difficult at all        I have personally reviewed the past medical history, past surgical history, medications, allergies, family and social history as listed below, on 3/14/2017.    Patient Active Problem List      Diagnosis Date Noted     Attention deficit hyperactivity disorder (ADHD), combined type 12/01/2015     Anxiety 12/01/2015     Controlled substance agreement signed - 12/14/2016 12/01/2015     Migraine without aura and without status migrainosus, not intractable 06/19/2015      Sprain of left thumb 08/13/2014     Motion sickness 08/13/2014     Hypertension 08/13/2014     Social phobia 12/19/2013     Seasonal allergic rhinitis 12/10/2013     Dust allergy 12/10/2013     Allergic rhinitis due to cat hair 12/10/2013     GERD (gastroesophageal reflux disease) 12/10/2013     Sleeping difficulties 12/10/2013     Major depression - Followed by [facility] 12/10/2013     Hyperglycemia 12/10/2013     Hair loss 12/10/2013     Medial epicondylitis of elbow 12/10/2013     Lateral epicondylitis  of elbow 12/10/2013     Elevated liver enzymes 12/10/2013     Ulcerative colitis, unspecified 08/09/2007     Past Medical History      Diagnosis   Date     Allergic rhinitis due to cat hair  12/10/2013     Depression  12/10/2013     Previously followed with Psychiatry - was on Remeron, Adderall, Lexapro in the past. Awaiting to re-establish psychiatry care again.       Dust allergy  12/10/2013     GERD (gastroesophageal reflux disease)  12/10/2013     Migraine headache  12/10/2013     Seasonal allergic rhinitis  12/10/2013     Sleeping difficulties  12/10/2013     ULCERATIVE COLITIS  8/9/2007     Past Surgical History       Procedure   Laterality Date     Appendectomy   2/4/13     Dr. Givens/[facility]       Current Outpatient Prescriptions       Medication  Sig Dispense Refill     albuterol HFA (PROAIR HFA) 90 mcg/actuation inhaler Inhale 1-2 Puffs by mouth every 2 hours if needed for Shortness Of Breath or Wheezing. 1 Inhaler 0     amitriptyline (ELAVIL) 25 mg tablet Take 2 tablets by mouth at bedtime. 60 tablet 11     amLODIPine (NORVASC) 5 mg tablet Take 1 tablet by mouth once daily. 30 tablet 11     dextroamphetamine-amphetamine (ADDERALL XR) 30 mg Extended-Release capsule Take 1 capsule by mouth once daily  -- for on or after 05/09/17 31 capsule 0     dextroamphetamine-amphetamine (ADDERALL XR) 30 mg Extended-Release capsule Take 1 capsule by mouth once daily  -- for on or after 04/11/17 31  capsule 0     dextroamphetamine-amphetamine (ADDERALL XR) 30 mg Extended-Release capsule Take 1 capsule by mouth once daily  - for on or after 3/14/2017 31 capsule 0     escitalopram oxalate (LEXAPRO) 10 mg tablet Take 1 tablet by mouth once daily. 30 tablet 11     finasteride (PROPECIA) 1 mg tablet Take 1 tablet by mouth every morning. 90 tablet 3     LORazepam (ATIVAN) 0.5 mg tab Take 1 tablet by mouth 2 times daily if needed for Anxiety. - for on or after 12/14/2016 - 6 month supply 60 tablet 5     losartan (COZAAR) 50 mg tablet Take 1 tablet by mouth once daily. 30 tablet 11     mesalamine 400 mg DELAYED RELEASE (DELZICOL) 400 mg cpDR capsule TAKE TWO CAPSULES BY MOUTH 3 TO 4 TIMES DAILY 240 capsule 11     methylPREDNISolone (MEDROL) 4 mg tablet TAKE AS DIRECTED FOR 10 DAYS FOR ULCERATIVE COLITIS FLAIR. REPEAT EVERY 4 WEEKS IF NEEDED. MAXIMUM OF 40 TABLETS MONTHLY 40 tablet 1     mometasone (NASONEX) (50 mcg each actuation) nasal spray Inhale 1 Spray into both nostrils once daily. - use generic covered nasal steroid 1 canister 11     omeprazole (PRILOSEC) 20 mg Delayed-Release capsule Take 1 capsule by mouth once daily before a meal. Take 30 to 60 minutes prior to 1st meal of the day 30 capsule 11     scopolamine 1.5mg (TRANSDERM SCOP) 1.5 mg (1 mg over 3 days) patch Apply 1 Patch on dry, clean, hairless skin every 72 hours. For Motion Sickness 10 Patch 11     SUMAtriptan (IMITREX) 50 mg tablet TAKE 1/2 TO 1 TABLET BY MOUTH EVERY 2 HOURS AS NEEDED FOR FOR MIGRAINE max DOSE 4 tablets PER 24 hours 15 tablet 11     Allergies      Allergen   Reactions     Cats (Fur, Dander, Saliva)  Runny Nose     Itchy eyes and hand swelling        Lisinopril  Cough     Other [Unlisted Allergen (Include Detail In Comments)]  Angioedema     Parsnip      Family History       Problem   Relation Age of Onset     Psychiatric illness        Aunt - suicide fall 2013       Cancer-colon  Paternal Grandmother      Cancer-colon  Maternal  "Grandmother      Family Status     Relation  Status     Mother Alive    obesity      Father Alive    DM2 - testicular CA, CAD s/p MI at about 58      Sister Alive     Paternal Grandfather Alive    CAD s/p MI      Paternal Aunt     suicide 2013      Paternal Grandmother Alive    colon CA      Maternal Grandmother Alive    colon CA      Social History     Social History        Marital status:  Single     Spouse name: N/A     Number of children:  N/A     Years of education:  N/A     Social History Main Topics         Smoking status:   Never Smoker     Smokeless tobacco:   Never Used     Alcohol use   0.0 oz/week     0 Standard drinks or equivalent per week        Comment: ocassionally       Drug use:   No     Sexual activity:   Not on file     Other Topics  Concern     Not on file      Social History Narrative     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     Recently was doing landscaping and carpentry work     - minimal work as of 2014, for past 1 month.    Moved back to [facility] in about 2012.         For family mental health issues, he notes that an aunt committed suicide in    2013 and 3 maternal aunts and his mother are on antidepressants. His    maternal grandmother was diagnosed with schizoaffective disorder, as was a    male cousin. Attention deficit hyperactivity disorder was significant for an    uncle and the client's cousins.        Pertinent ROS was performed and was negative as noted in HPI above.     EXAM:   Vitals:     17 1553   BP: 136/84   Pulse: 96   Temp: 98.6  F (37  C)   TempSrc: Tympanic   Weight: 86.6 kg (191 lb)   Height: 1.778 m (5' 10\")     BP Readings from Last 3 Encounters:    17 136/84   16 138/88   16 118/80     Wt Readings from Last 3 Encounters:    17 86.6 kg (191 lb)   16 95 kg (209 lb 6 oz)   16 86.6 kg (191 lb)     Estimated body mass index " "is 27.41 kg/(m^2) as calculated from the following:    Height as of this encounter: 1.778 m (5' 10\").    Weight as of this encounter: 86.6 kg (191 lb).     EXAM:  Constitutional: Pleasant, alert, appropriate appearance for age. No acute distress  Lymphatic Exam: Non-palpable nodes in neck, clavicular regions  Pulmonary: Lungs are clear to auscultation bilaterally, without wheezes or crackles  Cardiovascular Exam: regular rate and rhythm  Gastrointestinal Exam: Soft, non-tender, non-distended, positive bowel sounds  Integument: No abnormal rashes, sores, or ulcerations noted  Neurologic Exam: CN 3-12 grossly intact   Musculoskeletal Exam: Gait and station appear grossly normal  Psychiatric Exam: Awake and Alert, Affect and mood appropriate  Speech is fluent, Thought process is normal    INVESTIGATIONS:  Results for orders placed or performed in visit on 12/14/16      COMPLIANCE DRUG ANALYSIS      Result  Value Ref Range    6-MONOACETYL MORPHINE NEG NEG ng/mL    AMPHETAMINE URINE NEG <=500 ng/mL    BARBITURATE URINE NEG <=200 ng/mL    BENZODIAZEPINE URINE NEG <=200 ng/mL    BUPRENORPHRINE URINE NEG <=5 ng/mL    COCAINE METAB URINE NEG <=300 ng/mL    ETHYLGLUCURONIDE URINE NEG <=250 ng/mL    FENTANYL URINE NEG <=4 ng/mL    METHADONE URINE NEG <=300 ng/mL    OPIATES URINE NEG <=300 ng/mL    OXYCODONE URINE NEG <=100 ng/mL    PROPOXYPHENE URINE NEG <=300 ng/mL    THC 50 URINE NEG <=50 ng/mL    TRAMADOL NEG <=200 ng/mL    PH URINE 7.3 (H) 5.0 - 7.0    CREAT  >=20 mg/dL    MASS SPECTROMETRY URINE See Below        ASSESSMENT AND PLAN:  Kike was seen today for medication management.    Diagnoses and all orders for this visit:    Attention deficit hyperactivity disorder (ADHD), combined type  -     dextroamphetamine-amphetamine (ADDERALL XR) 30 mg Extended-Release capsule; Take 1 capsule by mouth once daily  -- for on or after 05/09/17  -     dextroamphetamine-amphetamine (ADDERALL XR) 30 mg Extended-Release capsule; " Take 1 capsule by mouth once daily  -- for on or after 04/11/17  -     dextroamphetamine-amphetamine (ADDERALL XR) 30 mg Extended-Release capsule; Take 1 capsule by mouth once daily  - for on or after 3/14/2017    Sleeping difficulties  -     amitriptyline (ELAVIL) 25 mg tablet; Take 2 tablets by mouth at bedtime.    Hypertension  -     amLODIPine (NORVASC) 5 mg tablet; Take 1 tablet by mouth once daily.  -     losartan (COZAAR) 50 mg tablet; Take 1 tablet by mouth once daily.    Social phobia  -     dextroamphetamine-amphetamine (ADDERALL XR) 30 mg Extended-Release capsule; Take 1 capsule by mouth once daily  -- for on or after 05/09/17  -     dextroamphetamine-amphetamine (ADDERALL XR) 30 mg Extended-Release capsule; Take 1 capsule by mouth once daily  -- for on or after 04/11/17  -     dextroamphetamine-amphetamine (ADDERALL XR) 30 mg Extended-Release capsule; Take 1 capsule by mouth once daily  - for on or after 3/14/2017    Controlled substance agreement signed - 12/14/2016  -     dextroamphetamine-amphetamine (ADDERALL XR) 30 mg Extended-Release capsule; Take 1 capsule by mouth once daily  -- for on or after 05/09/17  -     dextroamphetamine-amphetamine (ADDERALL XR) 30 mg Extended-Release capsule; Take 1 capsule by mouth once daily  -- for on or after 04/11/17  -     dextroamphetamine-amphetamine (ADDERALL XR) 30 mg Extended-Release capsule; Take 1 capsule by mouth once daily  - for on or after 3/14/2017    Recurrent major depressive disorder, in full remission (HC)  -     escitalopram oxalate (LEXAPRO) 10 mg tablet; Take 1 tablet by mouth once daily.    Ulcerative pancolitis without complication (HC)  -     mesalamine 400 mg DELAYED RELEASE (DELZICOL) 400 mg cpDR capsule; TAKE TWO CAPSULES BY MOUTH 3 TO 4 TIMES DAILY    Migraine without aura and without status migrainosus, not intractable  -     SUMAtriptan (IMITREX) 50 mg tablet; TAKE 1/2 TO 1 TABLET BY MOUTH EVERY 2 HOURS AS NEEDED FOR FOR MIGRAINE max  DOSE 4 tablets PER 24 hours    Motion sickness, subsequent encounter  -     scopolamine 1.5mg (TRANSDERM SCOP) 1.5 mg (1 mg over 3 days) patch; Apply 1 Patch on dry, clean, hairless skin every 72 hours. For Motion Sickness    Gastroesophageal reflux disease without esophagitis  -     omeprazole (PRILOSEC) 20 mg Delayed-Release capsule; Take 1 capsule by mouth once daily before a meal. Take 30 to 60 minutes prior to 1st meal of the day    Ulcerative colitis, quiescent, unspecified complication  -     methylPREDNISolone (MEDROL) 4 mg tablet; TAKE AS DIRECTED FOR 10 DAYS FOR ULCERATIVE COLITIS FLAIR. REPEAT EVERY 4 WEEKS IF NEEDED. MAXIMUM OF 40 TABLETS MONTHLY    Seasonal allergic rhinitis due to pollen  -     mometasone (NASONEX) (50 mcg each actuation) nasal spray; Inhale 1 Spray into both nostrils once daily. - use generic covered nasal steroid    Dust allergy  -     mometasone (NASONEX) (50 mcg each actuation) nasal spray; Inhale 1 Spray into both nostrils once daily. - use generic covered nasal steroid    Allergic rhinitis due to cat hair  -     mometasone (NASONEX) (50 mcg each actuation) nasal spray; Inhale 1 Spray into both nostrils once daily. - use generic covered nasal steroid      lab results and schedule of future lab studies reviewed with patient, reviewed diet, exercise and weight control, cardiovascular risk and specific lipid/LDL goals reviewed    -- Expected clinical course discussed   -- Medications and their side effects discussed    The ASCVD Risk score ([place] NEIL Jr., et al., 2013) failed to calculate for the following reasons:    The 2013 ASCVD risk score is only valid for ages 40 to 79    [place] is also recommended to eat a heart-healthy diet, do regular aerobic exercises, maintain a desirable body weight, and avoid tobacco products. These recommendations are from the American Heart Association (AHA) which stresses the importance of lifestyle changes to lower cardiovascular disease risk.      Return in about 3 months (around 6/14/2017) for - Adderall refill. .    Patient Instructions   Medications refilled.     Glad you doing well with current medications.    Return in approximately 3 month(s), or sooner as needed for follow-up with Dr. Salcido.    Clinic : [phone]  Appointment line: [phone]      Jarad Salcido MD

## 2018-01-03 NOTE — NURSING NOTE
Patient Information     Patient Name MRN Kike Faria [id number] Male 1980      Nursing Note by Mariela Puentes at 3/14/2017  3:50 PM     Author:  Mariela Puentes Service:  (none) Author Type:  (none)     Filed:  3/14/2017  4:00 PM Encounter Date:  3/14/2017 Status:  Signed     :  Mariela Puentes            Patient presents to the clinic for medication management.  Last administration of Adderall was yesterday, last Ativan was within the past 1-2 hours.    As a proxy delegate, I have queried the [place] and/or [place] Prescription Monitoring Program for this patient and provided the clinician with a printed copy of the report for the preceding 12 months.    Mariela Puentes.......3/14/2017  3:51 PM

## 2018-01-04 ENCOUNTER — COMMUNICATION - GICH (OUTPATIENT)
Dept: INTERNAL MEDICINE | Facility: OTHER | Age: 38
End: 2018-01-04

## 2018-01-04 DIAGNOSIS — J30.81 ALLERGIC RHINITIS DUE TO ANIMAL HAIR AND DANDER: ICD-10-CM

## 2018-01-04 DIAGNOSIS — J30.2 OTHER SEASONAL ALLERGIC RHINITIS: ICD-10-CM

## 2018-01-04 NOTE — TELEPHONE ENCOUNTER
Patient Information     Patient Name MRN Sex Kike Nowak [id number] Male 1980      Telephone Encounter by Manju Pugh RPh at 2017  4:42 PM     Author:  Manju Pugh RPh Service:  (none) Author Type:  PHARM- Pharmacist     Filed:  2017  4:43 PM Encounter Date:  2017 Status:  Signed     :  Manju Pugh RPh (PHARM- Pharmacist)            Mometasone nasal spray was switched to triamcinolone nasal spray per [facility] for insurance purposes.

## 2018-01-04 NOTE — TELEPHONE ENCOUNTER
Patient Information     Patient Name MRN Sex Kike Nowak [id number] Male 1980      Telephone Encounter by Jarad Salcido MD at 2017  6:26 PM     Author:  Jarad Salcido MD Service:  (none) Author Type:  Physician     Filed:  2017  6:26 PM Encounter Date:  2017 Status:  Signed     :  Jarad Salcido MD (Physician)            Noted.  Agree with plan as identified.  Jarad Salcido MD

## 2018-01-04 NOTE — PROGRESS NOTES
Patient Information     Patient Name MRN Kike Faria [id number] Male 1980      Progress Notes by Sujit Fonseca MD at 2017  7:45 AM     Author:  Sujit Fonseca MD Service:  (none) Author Type:  Physician     Filed:  2017  1:11 PM Encounter Date:  2017 Status:  Signed     :  Sujit Fonseca MD (Physician)            SUBJECTIVE:    Kike Hess is a 36 y.o. male who presents for DOT physical    HPI Comments: Patient arrives here for a DOT physical. Currently he does not have any specific concerns or complaints. Patient does have a history of hypertension that is being followed closely by his primary physician.      Allergies      Allergen   Reactions     Cats (Fur, Dander, Saliva)  Runny Nose     Itchy eyes and hand swelling        Lisinopril  Cough     Other [Unlisted Allergen (Include Detail In Comments)]  Angioedema     Parsnip    ,   Family History       Problem   Relation Age of Onset     Psychiatric illness        Aunt - suicide 2013       Cancer-colon  Paternal Grandmother      Cancer-colon  Maternal Grandmother     and   Current Outpatient Prescriptions on File Prior to Visit       Medication  Sig Dispense Refill     albuterol HFA (PROAIR HFA) 90 mcg/actuation inhaler Inhale 1-2 Puffs by mouth every 2 hours if needed for Shortness Of Breath or Wheezing. 1 Inhaler 0     amitriptyline (ELAVIL) 25 mg tablet Take 2 tablets by mouth at bedtime. 60 tablet 11     amLODIPine (NORVASC) 5 mg tablet Take 1 tablet by mouth once daily. 30 tablet 11     dextroamphetamine-amphetamine (ADDERALL XR) 30 mg Extended-Release capsule Take 1 capsule by mouth once daily  - for on or after 3/14/2017 31 capsule 0     dextroamphetamine-amphetamine (ADDERALL XR) 30 mg Extended-Release capsule Take 1 capsule by mouth once daily  -- for on or after 17 31 capsule 0     dextroamphetamine-amphetamine (ADDERALL XR) 30 mg Extended-Release capsule Take 1 capsule by mouth  "once daily  -- for on or after 04/11/17 31 capsule 0     escitalopram oxalate (LEXAPRO) 10 mg tablet Take 1 tablet by mouth once daily. 30 tablet 11     finasteride (PROPECIA) 1 mg tablet Take 1 tablet by mouth every morning. 90 tablet 3     LORazepam (ATIVAN) 0.5 mg tab Take 1 tablet by mouth 2 times daily if needed for Anxiety. - for on or after 12/14/2016 - 6 month supply 60 tablet 5     losartan (COZAAR) 50 mg tablet Take 1 tablet by mouth once daily. 30 tablet 11     mesalamine 400 mg DELAYED RELEASE (DELZICOL) 400 mg cpDR capsule TAKE TWO CAPSULES BY MOUTH 3 TO 4 TIMES DAILY 240 capsule 11     methylPREDNISolone (MEDROL) 4 mg tablet TAKE AS DIRECTED FOR 10 DAYS FOR ULCERATIVE COLITIS FLAIR. REPEAT EVERY 4 WEEKS IF NEEDED. MAXIMUM OF 40 TABLETS MONTHLY 40 tablet 1     mometasone (NASONEX) (50 mcg each actuation) nasal spray Inhale 1 Spray into both nostrils once daily. - use generic covered nasal steroid 1 canister 11     omeprazole (PRILOSEC) 20 mg Delayed-Release capsule Take 1 capsule by mouth once daily before a meal. Take 30 to 60 minutes prior to 1st meal of the day 30 capsule 11     scopolamine 1.5mg (TRANSDERM SCOP) 1.5 mg (1 mg over 3 days) patch Apply 1 Patch on dry, clean, hairless skin every 72 hours. For Motion Sickness 10 Patch 11     SUMAtriptan (IMITREX) 50 mg tablet TAKE 1/2 TO 1 TABLET BY MOUTH EVERY 2 HOURS AS NEEDED FOR FOR MIGRAINE max DOSE 4 tablets PER 24 hours 15 tablet 11     No current facility-administered medications on file prior to visit.        REVIEW OF SYSTEMS:  ROS    OBJECTIVE:  /98  Pulse 88  Ht 1.778 m (5' 10\")  Wt 83.9 kg (185 lb)  BMI 26.54 kg/m2    EXAM:   Physical Exam   Constitutional: He is well-developed, well-nourished, and in no distress.   HENT:   Right Ear: External ear normal.   Left Ear: External ear normal.   Mouth/Throat: No oropharyngeal exudate.   Eyes: Conjunctivae and EOM are normal. Pupils are equal, round, and reactive to light.   Neck: Normal " range of motion.   Cardiovascular: Normal rate and regular rhythm.    No murmur heard.  Pulmonary/Chest: Effort normal.   Abdominal: Soft.   Genitourinary: Penis normal.   Genitourinary Comments: No hernia   Musculoskeletal: Normal range of motion.   Neurological: He is alert.   Skin: Skin is warm.   Psychiatric: Affect normal.       ASSESSMENT/PLAN:    ICD-10-CM    1. Preventative health care Z00.00 URINALYSIS W REFLEX MICROSCOPIC IF POSITIVE      URINALYSIS W REFLEX MICROSCOPIC IF POSITIVE      URINALYSIS MICROSCOPIC      URINALYSIS MICROSCOPIC        Plan:  Blood pressures have recently been satisfactory in the past. One-year certificate given. Follow up with his primary physician.

## 2018-01-05 NOTE — TELEPHONE ENCOUNTER
Patient Information     Patient Name MRN Sex Kike Nowak [id number] Male 1980      Telephone Encounter by Liliane Gomes RN at 2017  3:14 PM     Author:  Liliane Gomes RN Service:  (none) Author Type:  NURS- Registered Nurse     Filed:  2017  3:16 PM Encounter Date:  2017 Status:  Signed     :  Liliane Gomes RN (NURS- Registered Nurse)            Medication is not on refill protocol. Unable to complete prescription refill per RN Medication Refill Policy.................... LILIANE GOMES RN ....................  2017   3:15 PM        This is a Refill request from: [facility]  Name of Medication: Methylprednisolone 4 mg tablet  Quantity requested: 40  Last fill date: 17  Last visit with LISANDRA SALCIDO was on: 2017 in [facility]  PCP:  Lisandra Salcido MD  Controlled Substance Agreement:  na   Diagnosis r/t this medication request: ulcerative colitis

## 2018-01-18 ENCOUNTER — OFFICE VISIT - GICH (OUTPATIENT)
Dept: FAMILY MEDICINE | Facility: OTHER | Age: 38
End: 2018-01-18

## 2018-01-18 ENCOUNTER — HISTORY (OUTPATIENT)
Dept: FAMILY MEDICINE | Facility: OTHER | Age: 38
End: 2018-01-18

## 2018-01-18 DIAGNOSIS — H57.89 OTHER SPECIFIED DISORDERS OF EYE AND ADNEXA: ICD-10-CM

## 2018-01-18 DIAGNOSIS — Z23 ENCOUNTER FOR IMMUNIZATION: ICD-10-CM

## 2018-01-22 ENCOUNTER — DOCUMENTATION ONLY (OUTPATIENT)
Dept: FAMILY MEDICINE | Facility: OTHER | Age: 38
End: 2018-01-22

## 2018-01-22 RX ORDER — ESCITALOPRAM OXALATE 10 MG/1
10 TABLET ORAL DAILY
COMMUNITY
Start: 2018-01-03 | End: 2018-04-04

## 2018-01-22 RX ORDER — LOSARTAN POTASSIUM 50 MG/1
50 TABLET ORAL DAILY
COMMUNITY
Start: 2018-01-03 | End: 2018-04-04

## 2018-01-22 RX ORDER — MESALAMINE 400 MG/1
2 CAPSULE, DELAYED RELEASE ORAL
COMMUNITY
Start: 2018-01-03 | End: 2018-04-04

## 2018-01-22 RX ORDER — DEXTROAMPHETAMINE SACCHARATE, AMPHETAMINE ASPARTATE MONOHYDRATE, DEXTROAMPHETAMINE SULFATE AND AMPHETAMINE SULFATE 7.5; 7.5; 7.5; 7.5 MG/1; MG/1; MG/1; MG/1
30 CAPSULE, EXTENDED RELEASE ORAL
COMMUNITY
Start: 2018-01-03 | End: 2018-02-02

## 2018-01-22 RX ORDER — LORAZEPAM 0.5 MG/1
0.5 TABLET ORAL 2 TIMES DAILY PRN
COMMUNITY
Start: 2018-01-03 | End: 2018-04-04

## 2018-01-22 RX ORDER — DEXTROAMPHETAMINE SACCHARATE, AMPHETAMINE ASPARTATE MONOHYDRATE, DEXTROAMPHETAMINE SULFATE AND AMPHETAMINE SULFATE 7.5; 7.5; 7.5; 7.5 MG/1; MG/1; MG/1; MG/1
30 CAPSULE, EXTENDED RELEASE ORAL
COMMUNITY
Start: 2018-02-02 | End: 2018-03-04

## 2018-01-22 RX ORDER — METHYLPREDNISOLONE 4 MG/1
TABLET ORAL
COMMUNITY
Start: 2017-06-23 | End: 2018-08-22

## 2018-01-22 RX ORDER — DEXTROAMPHETAMINE SACCHARATE, AMPHETAMINE ASPARTATE MONOHYDRATE, DEXTROAMPHETAMINE SULFATE AND AMPHETAMINE SULFATE 7.5; 7.5; 7.5; 7.5 MG/1; MG/1; MG/1; MG/1
30 CAPSULE, EXTENDED RELEASE ORAL
COMMUNITY
Start: 2018-03-04 | End: 2018-04-04

## 2018-01-22 RX ORDER — SUMATRIPTAN 50 MG/1
.5-1 TABLET, FILM COATED ORAL
COMMUNITY
Start: 2017-06-23 | End: 2018-04-04

## 2018-01-22 RX ORDER — MOMETASONE FUROATE MONOHYDRATE 50 UG/1
1 SPRAY, METERED NASAL DAILY
COMMUNITY
Start: 2018-01-03 | End: 2018-04-04

## 2018-01-22 RX ORDER — DEXTROAMPHETAMINE SACCHARATE, AMPHETAMINE ASPARTATE MONOHYDRATE, DEXTROAMPHETAMINE SULFATE AND AMPHETAMINE SULFATE 7.5; 7.5; 7.5; 7.5 MG/1; MG/1; MG/1; MG/1
30 CAPSULE, EXTENDED RELEASE ORAL
COMMUNITY
Start: 2017-06-23 | End: 2018-04-04

## 2018-01-22 RX ORDER — SCOLOPAMINE TRANSDERMAL SYSTEM 1 MG/1
1 PATCH, EXTENDED RELEASE TRANSDERMAL
COMMUNITY
Start: 2017-06-23 | End: 2018-04-04

## 2018-01-22 RX ORDER — AMLODIPINE BESYLATE 5 MG/1
1 TABLET ORAL DAILY
COMMUNITY
Start: 2018-01-03 | End: 2018-04-04

## 2018-01-26 VITALS
DIASTOLIC BLOOD PRESSURE: 70 MMHG | HEIGHT: 70 IN | SYSTOLIC BLOOD PRESSURE: 124 MMHG | BODY MASS INDEX: 26.82 KG/M2 | WEIGHT: 187.38 LBS | HEART RATE: 88 BPM

## 2018-01-26 VITALS
SYSTOLIC BLOOD PRESSURE: 136 MMHG | WEIGHT: 191 LBS | HEIGHT: 70 IN | TEMPERATURE: 98.6 F | DIASTOLIC BLOOD PRESSURE: 84 MMHG | HEART RATE: 96 BPM | BODY MASS INDEX: 27.35 KG/M2

## 2018-01-26 VITALS
HEART RATE: 88 BPM | WEIGHT: 185 LBS | BODY MASS INDEX: 26.48 KG/M2 | DIASTOLIC BLOOD PRESSURE: 98 MMHG | HEIGHT: 70 IN | SYSTOLIC BLOOD PRESSURE: 124 MMHG

## 2018-01-29 ASSESSMENT — ANXIETY QUESTIONNAIRES
GAD7 TOTAL SCORE: 0
GAD7 TOTAL SCORE: 0

## 2018-01-29 ASSESSMENT — PATIENT HEALTH QUESTIONNAIRE - PHQ9
SUM OF ALL RESPONSES TO PHQ QUESTIONS 1-9: 0

## 2018-02-09 VITALS
BODY MASS INDEX: 29.96 KG/M2 | HEART RATE: 88 BPM | DIASTOLIC BLOOD PRESSURE: 88 MMHG | TEMPERATURE: 98.6 F | SYSTOLIC BLOOD PRESSURE: 132 MMHG | WEIGHT: 208.8 LBS

## 2018-02-09 VITALS
HEIGHT: 70 IN | DIASTOLIC BLOOD PRESSURE: 72 MMHG | HEART RATE: 92 BPM | BODY MASS INDEX: 28.1 KG/M2 | WEIGHT: 196.25 LBS | SYSTOLIC BLOOD PRESSURE: 120 MMHG

## 2018-02-10 ASSESSMENT — ANXIETY QUESTIONNAIRES: GAD7 TOTAL SCORE: 0

## 2018-02-10 ASSESSMENT — PATIENT HEALTH QUESTIONNAIRE - PHQ9: SUM OF ALL RESPONSES TO PHQ QUESTIONS 1-9: 0

## 2018-02-12 NOTE — NURSING NOTE
Patient Information     Patient Name MRN Kike Faria [id number] Male 1980      Nursing Note by Mariela Puentes at 1/3/2018  9:40 AM     Author:  Mariela Puentes Service:  (none) Author Type:  (none)     Filed:  1/3/2018 10:05 AM Encounter Date:  1/3/2018 Status:  Signed     :  Mariela Puentes            Patient presents to the clinic for medication refill, last administration of Adderall was 3 days ago due to running out.      Mariela Puentes LPN        1/3/2018 9:57 AM

## 2018-02-12 NOTE — PATIENT INSTRUCTIONS
Patient Information     Patient Name MRN Kike Faria [id number] Male 1980      Patient Instructions by Jarad Salcido MD at 1/3/2018  9:40 AM     Author:  Jarad Salcido MD  Service:  (none) Author Type:  Physician     Filed:  1/3/2018 10:11 AM  Encounter Date:  1/3/2018 Status:  Addendum     :  Jarad Salcido MD (Physician)        Related Notes: Original Note by Jarad Salcido MD (Physician) filed at 1/3/2018 10:11 AM            Glad you are doing well with current medication regimen.     Blood pressures are well controlled.     Pleasure allergy symptoms aren't doing well with current medication regimen.    --> Continue Prilosec for heartburn -- use minimum dose for heartburn.     -- consider trying Zantac or Pepcid -- (use generic), up to twice daily if needed for heartburn.     === Return for Med Management appointment and Med refills === before running out of Controlled medications.                Bring pill bottle(s) and any remaining controlled substance meds to EACH appointment for pill counts.

## 2018-02-12 NOTE — PROGRESS NOTES
Patient Information     Patient Name MRN Sex Kike Nowak [id number] Male 1980      Progress Notes by Jarad Salcido MD at 1/3/2018  9:40 AM     Author:  Jarad Salcido MD Service:  (none) Author Type:  Physician     Filed:  1/3/2018 10:31 AM Encounter Date:  1/3/2018 Status:  Signed     :  Jarad Salcido MD (Physician)            Nursing Notes:   Mariela Puentes  1/3/2018 10:05 AM  Signed  Patient presents to the clinic for medication refill, last administration of Adderall was 3 days ago due to running out.      Mariela Puentes LPN        1/3/2018 9:57 AM    Kike Hess presents to clinic today for:   Chief Complaint    Patient presents with      Medication Management     HPI: Mr. Hess is a 37 y.o. male who presents today for evaluation of above.     (F90.2) Attention deficit hyperactivity disorder (ADHD), combined type  (primary encounter diagnosis)  (G47.9) Sleeping difficulties  (I10) Hypertension  (F33.42) Recurrent major depressive disorder, in full remission (HC)  (Z79.899) Controlled substance agreement signed - 1/3/2018  (F40.10) Social phobia  (F41.0) Panic attacks  (K51.00) Ulcerative pancolitis without complication (HC)  (J30.1) Seasonal allergic rhinitis due to pollen, unspecified chronicity  (J30.89) Dust allergy  (J30.81) Allergic rhinitis due to cat hair  (K21.9) Gastroesophageal reflux disease without esophagitis     Attention deficit disorder, chronic.  Doing well with Adderall XR.  He takes 1 tablet daily.  30 mg.  He will take medication holidays on the weekends at times, also on occasion in the summertime when he is not working he will skip the Adderall states that overall it is been doing quite well for him.  Helps him stay focused on his job.  He has had problems for many years.  Controlled substance agreement updated today.  [facility] website reviewed.  No abnormal findings noted.  He does take lorazepam.  No pain medication.    Sleep difficulties,  chronic.  Ativan does help with this.  Amitriptyline quite helpful.  Needs refills.    Hypertension, currently well controlled.  Tolerating medication.  Needs refills today.    History of depression, currently controlled.  Taking Lexapro.  Needs refills.    Social phobia and panic attacks, using Ativan intermittently.  Lexapro has helped it as well.    Ulcerative colitis, currently stable.  Has not used been using much oral steroids/Medrol lately.  Doing quite well with his alanine as well as dietary avoidance of certain foods.  Needs refills today.  Allergic rhinitis, stable.  Using Nasonex intermittently.  Needs refills today.    Heartburn and reflux, we discussed reducing use of Prilosec.  He was using it daily but states he only really needs to use it maybe every 2 or 3 days, less often if he watches his diet more closely.  He does use Zantac or ranitidine intermittently.  Refills sent to pharmacy.    Mr. Lemos Body mass index is 28.36 kg/(m^2). This is out of the normal range for a 37 y.o. Normal range for ages 18+ is between 18.5 and 24.9. To lose weight we reviewed risks and benefits of appropriate options such as diet, exercise, and medications. Patient's strategy will be  self-directed nutrition plan and self-directed exercise program   BP Readings from Last 1 Encounters:01/03/18 : 120/72  Mr. Hess's blood pressure is out of the normal range for adults. Per JNC-8 guidelines normal adult blood pressure is < 120/80, pre-hypertensive is between 120/80 and 139/89, and hypertension is 140/90 or greater. Risks of hypertension were discussed. Patient's strategy will be weight loss, increased activity and reduced salt intake    Functional Capacity: > 4 METS.   Reports that he can climb a flight of stairs without any chest pain/heaviness or shortness of breath.   Patient reports no current symptoms of fevers, chills, nausea/vomiting.   No cough. No shortness of breath.   No change in bowel/bladder  habits. No melena, hematochezia. No Hematuria.   No rashes. No palpitations.  No orthopnea/paroxysmal nocturnal dyspnea   No vision or hearing issues.   No significant mood issues   No bruising.     ANNMARIE:  ANNMARIE-7 ANXIETY SCREENING 6/23/2017 1/3/2018   ANNMARIE date (doc flow) 6/23/2017 1/3/2018   Nervous, anxious 0 0   Cannot stop worrying 0 0   Worry about different things 0 0   Cannot relax 0 0   Feeling restless 0 0   Easily annoyed/irritated 0 0   Afraid of awful event 0 0   Score 0 0   Severity none none   Some recent data might be hidden         PHQ9:  PHQ Depression Screening 6/23/2017 1/3/2018   Date of PHQ exam (doc flow) 6/23/2017 1/3/2018   1. Lack of interest/pleasure 0 - Not at all 0 - Not at all   2. Feeling down/depressed 0 - Not at all 0 - Not at all   PHQ-2 TOTAL SCORE 0 0   3. Trouble sleeping 0 - Not at all 0 - Not at all   4. Decreased energy 0 - Not at all 0 - Not at all   5. Appetite change 0 - Not at all 0 - Not at all   6. Feelings of failure 0 - Not at all 0 - Not at all   7. Trouble concentrating 0 - Not at all 0 - Not at all   8. Activity level 0 - Not at all 0 - Not at all   9. Hurting yourself 0 - Not at all 0 - Not at all   PHQ-9 TOTAL SCORE 0 0   PHQ-9 Severity Level none none   Functional Impairment not difficult at all not difficult at all   Some recent data might be hidden          I have personally reviewed the past medical history, past surgical history, medications, allergies, family and social history as listed below, on 1/3/2018.    Patient Active Problem List      Diagnosis Date Noted     Attention deficit hyperactivity disorder (ADHD), combined type 12/01/2015     Anxiety 12/01/2015     Controlled substance agreement signed - 1/3/2018 12/01/2015     Migraine without aura and without status migrainosus, not intractable 06/19/2015     Sprain of left thumb 08/13/2014     Motion sickness 08/13/2014     Hypertension 08/13/2014     Social phobia 12/19/2013     Seasonal allergic rhinitis  12/10/2013     Dust allergy 12/10/2013     Allergic rhinitis due to cat hair 12/10/2013     GERD (gastroesophageal reflux disease) 12/10/2013     Sleeping difficulties 12/10/2013     Major depression - Followed by [facility] 12/10/2013     Hyperglycemia 12/10/2013     Hair loss 12/10/2013     Medial epicondylitis of elbow 12/10/2013     Lateral epicondylitis  of elbow 12/10/2013     Elevated liver enzymes 12/10/2013     Ulcerative colitis, unspecified 08/09/2007     Past Medical History:     Diagnosis  Date     Allergic rhinitis due to cat hair 12/10/2013     Depression 12/10/2013    Previously followed with Psychiatry - was on Remeron, Adderall, Lexapro in the past. Awaiting to re-establish psychiatry care again.       Dust allergy 12/10/2013     GERD (gastroesophageal reflux disease) 12/10/2013     Migraine headache 12/10/2013     Seasonal allergic rhinitis 12/10/2013     Sleeping difficulties 12/10/2013     ULCERATIVE COLITIS 8/9/2007     Past Surgical History:      Procedure  Laterality Date     APPENDECTOMY  2/4/13    Dr. Givens/[facility]       Current Outpatient Prescriptions       Medication  Sig Dispense Refill     amitriptyline (ELAVIL) 25 mg tablet Take 2 tablets by mouth at bedtime. 60 tablet 11     amLODIPine (NORVASC) 5 mg tablet Take 1 tablet by mouth once daily. 30 tablet 11     dextroamphetamine-amphetamine (ADDERALL XR) 30 mg Extended-Release capsule Take 1 capsule by mouth once daily  Earliest Fill Date: 1/3/18 30 capsule 0     [START ON 2/2/2018] dextroamphetamine-amphetamine (ADDERALL XR) 30 mg Extended-Release capsule Take 1 capsule by mouth once daily  Earliest Fill Date: 2/1/18 30 capsule 0     [START ON 3/4/2018] dextroamphetamine-amphetamine (ADDERALL XR) 30 mg Extended-Release capsule Take 1 capsule by mouth once daily  Earliest Fill Date: 3/3/18 30 capsule 0     dextroamphetamine-amphetamine (ADDERALL XR) 30 mg Extended-Release capsule Take 1 capsule by mouth once daily  -  for on or after 6/23/2017 31 capsule 0     escitalopram oxalate (LEXAPRO) 10 mg tablet Take 1 tablet by mouth once daily. 30 tablet 11     LORazepam (ATIVAN) 0.5 mg tab Take 1 tablet by mouth 2 times daily if needed for Anxiety. - for on or after 6/23/2017  - 6 month supply 60 tablet 5     losartan (COZAAR) 50 mg tablet Take 1 tablet by mouth once daily. 30 tablet 11     mesalamine 400 mg DELAYED RELEASE (DELZICOL) 400 mg cpDR capsule TAKE TWO CAPSULES BY MOUTH 3 TO 4 TIMES DAILY 240 capsule 11     methylPREDNISolone (MEDROL) 4 mg tablet TAKE AS DIRECTED FOR 10 DAYS FOR ULCERATIVE COLITIS FLAIR. REPEAT EVERY 4 WEEKS IF NEEDED. MAXIMUM OF 40 TABLETS MONTHLY 40 tablet 5     mometasone (NASONEX) (50 mcg each actuation) nasal spray Inhale 1 Spray into both nostrils once daily. - use generic covered nasal steroid 1 canister 11     omeprazole (PRILOSEC) 20 mg Delayed-Release capsule Take 1 capsule by mouth once daily before a meal. Take 30 to 60 minutes prior to 1st meal of the day 30 capsule 11     scopolamine 1.5mg (TRANSDERM SCOP) 1.5 mg (1 mg over 3 days) patch Apply 1 Patch on dry, clean, hairless skin every 72 hours. For Motion Sickness 10 Patch 11     SUMAtriptan (IMITREX) 50 mg tablet TAKE 1/2 TO 1 TABLET BY MOUTH EVERY 2 HOURS AS NEEDED FOR FOR MIGRAINE max DOSE 4 tablets PER 24 hours 15 tablet 11     Allergies      Allergen   Reactions     Cats (Fur, Dander, Saliva)  Runny Nose     Itchy eyes and hand swelling        Lisinopril  Cough     Other [Unlisted Allergen (Include Detail In Comments)]  Angioedema     Parsnip      Family History       Problem   Relation Age of Onset     Psychiatric illness        Aunt - suicide fall 2013       Cancer-colon  Paternal Grandmother      Cancer-colon  Maternal Grandmother      Family Status     Relation  Status     Mother Alive    obesity      Father Alive    DM2 - testicular CA, CAD s/p MI at about 59      Sister Alive     Paternal Grandfather Alive    CAD s/p MI       "Paternal Aunt     suicide 2013      Paternal Grandmother Alive    colon CA      Maternal Grandmother Alive    colon CA      Social History     Social History        Marital status:  Single     Spouse name: N/A     Number of children:  N/A     Years of education:  N/A     Social History Main Topics         Smoking status:   Never Smoker     Smokeless tobacco:   Never Used     Alcohol use   0.0 oz/week     0 Standard drinks or equivalent per week        Comment: ocassionally       Drug use:   No     Sexual activity:   Not on file     Other Topics  Concern     Not on file      Social History Narrative     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    Moved back to [facility] in about 2012.         For family mental health issues, he notes that an aunt committed suicide in    2013 and 3 maternal aunts and his mother are on antidepressants. His    maternal grandmother was diagnosed with schizoaffective disorder, as was a    male cousin. Attention deficit hyperactivity disorder was significant for an    uncle and the client's cousins.      Pertinent ROS was performed and was negative as noted in HPI above.     EXAM:   Vitals:     18 0959   BP: 120/72   Cuff Site: Right Arm   Position: Sitting   Cuff Size: Adult Regular   Pulse: 92   Weight: 89 kg (196 lb 4 oz)   Height: 1.772 m (5' 9.75\")     BP Readings from Last 3 Encounters:    18 120/72   17 (!) 156/118   17 124/70     Wt Readings from Last 3 Encounters:    18 89 kg (196 lb 4 oz)   17 86.2 kg (190 lb)   17 85 kg (187 lb 6 oz)     Estimated body mass index is 28.36 kg/(m^2) as calculated from the following:    Height as of this encounter: 1.772 m (5' 9.75\").    Weight as of this encounter: 89 kg (196 lb 4 oz). "     EXAM:  Constitutional: Pleasant, alert, appropriate appearance for age. No acute distress  ENT: Normocephalic, Atraumatic, Thyroid without nodules or tenderness   Nose/Mouth: Oral pharynx without erythema or exudates, Nose is patent bilaterally, no rhinorrhea and Dental hygeine adequate   Eyes:  Extraocular muscles intact, Sclera non-icteric, Conjunctiva without erythema  Lymphatic Exam: Non-palpable nodes in neck, clavicular regions  Pulmonary: Lungs are clear to auscultation bilaterally, without wheezes or crackles  Cardiovascular Exam: regular rate and rhythm, no pedal edema  Gastrointestinal Exam: Soft, non-tender, non-distended, positive bowel sounds  Integument: No abnormal rashes, sores, or ulcerations noted  Neurologic Exam: CN 3-12 grossly intact   Musculoskeletal Exam: Moves upper and lower extremities symmetrically, No focal weakness  Gait and station appear grossly normal  Psychiatric Exam: Awake and Alert, Affect and mood appropriate  Speech is fluent, Thought process is normal    INVESTIGATIONS:  Results for orders placed or performed during the hospital encounter of 11/08/17      GC CHLAMYDIA TRACH PROBE      Result  Value Ref Range    CHLAMYDIA PCR NOT Detected NOT Detected, Invalid    N GONORRHOEAE PCR NOT Detected NOT Detected, Invalid   TSH      Result  Value Ref Range    TSH 2.51 0.34 - 5.60 uIU/mL       ASSESSMENT AND PLAN:  Kike was seen today for medication management.    Diagnoses and all orders for this visit:    Attention deficit hyperactivity disorder (ADHD), combined type  -     dextroamphetamine-amphetamine (ADDERALL XR) 30 mg Extended-Release capsule; Take 1 capsule by mouth once daily  Earliest Fill Date: 1/3/18  -     dextroamphetamine-amphetamine (ADDERALL XR) 30 mg Extended-Release capsule; Take 1 capsule by mouth once daily  Earliest Fill Date: 2/1/18  -     dextroamphetamine-amphetamine (ADDERALL XR) 30 mg Extended-Release capsule; Take 1 capsule by mouth once daily   Earliest Fill Date: 3/3/18    Sleeping difficulties  -     amitriptyline (ELAVIL) 25 mg tablet; Take 2 tablets by mouth at bedtime.    Hypertension  -     amLODIPine (NORVASC) 5 mg tablet; Take 1 tablet by mouth once daily.  -     losartan (COZAAR) 50 mg tablet; Take 1 tablet by mouth once daily.    Recurrent major depressive disorder, in full remission (HC)  -     escitalopram oxalate (LEXAPRO) 10 mg tablet; Take 1 tablet by mouth once daily.    Controlled substance agreement signed - 1/3/2018  -     LORazepam (ATIVAN) 0.5 mg tab; Take 1 tablet by mouth 2 times daily if needed for Anxiety. - for on or after 6/23/2017  - 6 month supply    Social phobia  -     LORazepam (ATIVAN) 0.5 mg tab; Take 1 tablet by mouth 2 times daily if needed for Anxiety. - for on or after 6/23/2017  - 6 month supply    Panic attacks  -     LORazepam (ATIVAN) 0.5 mg tab; Take 1 tablet by mouth 2 times daily if needed for Anxiety. - for on or after 6/23/2017  - 6 month supply    Ulcerative pancolitis without complication (HC)  -     mesalamine 400 mg DELAYED RELEASE (DELZICOL) 400 mg cpDR capsule; TAKE TWO CAPSULES BY MOUTH 3 TO 4 TIMES DAILY    Seasonal allergic rhinitis due to pollen, unspecified chronicity  -     mometasone (NASONEX) (50 mcg each actuation) nasal spray; Inhale 1 Spray into both nostrils once daily. - use generic covered nasal steroid    Dust allergy  -     mometasone (NASONEX) (50 mcg each actuation) nasal spray; Inhale 1 Spray into both nostrils once daily. - use generic covered nasal steroid    Allergic rhinitis due to cat hair  -     mometasone (NASONEX) (50 mcg each actuation) nasal spray; Inhale 1 Spray into both nostrils once daily. - use generic covered nasal steroid    Gastroesophageal reflux disease without esophagitis  -     omeprazole (PRILOSEC) 20 mg Delayed-Release capsule; Take 1 capsule by mouth once daily before a meal. Take 30 to 60 minutes prior to 1st meal of the day      lab results and schedule of  future lab studies reviewed with patient, reviewed diet, exercise and weight control, recommended sodium restriction    -- Expected clinical course discussed   -- Medications and their side effects discussed    Return in about 3 months (around 4/3/2018).    Patient Instructions   Glad you are doing well with current medication regimen.     Blood pressures are well controlled.     Pleasure allergy symptoms aren't doing well with current medication regimen.    --> Continue Prilosec for heartburn -- use minimum dose for heartburn.     -- consider trying Zantac or Pepcid -- (use generic), up to twice daily if needed for heartburn.     === Return for Med Management appointment and Med refills === before running out of Controlled medications.                Bring pill bottle(s) and any remaining controlled substance meds to EACH appointment for pill counts.         Jarad Salcido MD

## 2018-02-13 NOTE — PROGRESS NOTES
Patient Information     Patient Name MRN Sex Kike Nowak [id number] Male 1980      Progress Notes by Magnolia Barnett NP at 2018  7:45 PM     Author:  Magnolia Barnett NP Service:  (none) Author Type:  PHYS- Nurse Practitioner     Filed:  2018  9:22 PM Encounter Date:  2018 Status:  Signed     :  Magnolia Barnett NP (PHYS- Nurse Practitioner)            HPI:    Kike Hess is a 37 y.o. male who presents to clinic today for eye irritation.   Right eye irritation for the past few days.  States it does not feel like a scratch or foreign body.  States it feels infected.  Clear drainage.  No crusting.  No light sensitivity.  Mild blurry vision when draining.  States he had an allergic reaction from eating a carrot (states in the past parsnip caused the same symptoms) - facial swelling.  States he took prednisone and benadryl and it resolved.  He has not tried any eye drops.  Denies any sore throat or difficulty swallowing.  Denies fevers.  Denies cough.  Normal appetite.  Normal energy.  Requesting influenza shot today.          Past Medical History:     Diagnosis  Date     Allergic rhinitis due to cat hair 12/10/2013     Depression 12/10/2013    Previously followed with Psychiatry - was on Remeron, Adderall, Lexapro in the past. Awaiting to re-establish psychiatry care again.       Dust allergy 12/10/2013     GERD (gastroesophageal reflux disease) 12/10/2013     Migraine headache 12/10/2013     Seasonal allergic rhinitis 12/10/2013     Sleeping difficulties 12/10/2013     ULCERATIVE COLITIS 2007     Past Surgical History:      Procedure  Laterality Date     APPENDECTOMY  13    Dr. Givens/[facility]       Social History       Substance Use Topics         Smoking status:   Never Smoker     Smokeless tobacco:   Never Used     Alcohol use   0.0 oz/week      0 Standard drinks or equivalent per week         Comment: ocassionally       Current Outpatient Prescriptions        Medication  Sig Dispense Refill     amitriptyline (ELAVIL) 25 mg tablet Take 2 tablets by mouth at bedtime. 60 tablet 11     amLODIPine (NORVASC) 5 mg tablet Take 1 tablet by mouth once daily. 30 tablet 11     budesonide (RHINOCORT AQUA) (32 mcg each actuation) nasal spray Inhale 1 Spray into both nostrils once daily. 1 Each 11     dextroamphetamine-amphetamine (ADDERALL XR) 30 mg Extended-Release capsule Take 1 capsule by mouth once daily  Earliest Fill Date: 1/3/18 30 capsule 0     [START ON 2/2/2018] dextroamphetamine-amphetamine (ADDERALL XR) 30 mg Extended-Release capsule Take 1 capsule by mouth once daily  Earliest Fill Date: 2/1/18 30 capsule 0     [START ON 3/4/2018] dextroamphetamine-amphetamine (ADDERALL XR) 30 mg Extended-Release capsule Take 1 capsule by mouth once daily  Earliest Fill Date: 3/3/18 30 capsule 0     dextroamphetamine-amphetamine (ADDERALL XR) 30 mg Extended-Release capsule Take 1 capsule by mouth once daily  - for on or after 6/23/2017 31 capsule 0     escitalopram oxalate (LEXAPRO) 10 mg tablet Take 1 tablet by mouth once daily. 30 tablet 11     LORazepam (ATIVAN) 0.5 mg tab Take 1 tablet by mouth 2 times daily if needed for Anxiety. - for on or after 6/23/2017  - 6 month supply 60 tablet 5     losartan (COZAAR) 50 mg tablet Take 1 tablet by mouth once daily. 30 tablet 11     mesalamine 400 mg DELAYED RELEASE (DELZICOL) 400 mg cpDR capsule TAKE TWO CAPSULES BY MOUTH 3 TO 4 TIMES DAILY 240 capsule 11     methylPREDNISolone (MEDROL) 4 mg tablet TAKE AS DIRECTED FOR 10 DAYS FOR ULCERATIVE COLITIS FLAIR. REPEAT EVERY 4 WEEKS IF NEEDED. MAXIMUM OF 40 TABLETS MONTHLY 40 tablet 5     mometasone (NASONEX) (50 mcg each actuation) nasal spray Inhale 1 Spray into both nostrils once daily. - use generic covered nasal steroid 1 canister 11     omeprazole (PRILOSEC) 20 mg Delayed-Release capsule Take 1 capsule by mouth once daily before a meal. Take 30 to 60 minutes prior to 1st meal of the day  30 capsule 11     scopolamine 1.5mg (TRANSDERM SCOP) 1.5 mg (1 mg over 3 days) patch Apply 1 Patch on dry, clean, hairless skin every 72 hours. For Motion Sickness 10 Patch 11     SUMAtriptan (IMITREX) 50 mg tablet TAKE 1/2 TO 1 TABLET BY MOUTH EVERY 2 HOURS AS NEEDED FOR FOR MIGRAINE max DOSE 4 tablets PER 24 hours 15 tablet 11     No current facility-administered medications for this visit.      Medications have been reviewed by me and are current to the best of my knowledge and ability.    Allergies      Allergen   Reactions     Cats (Fur, Dander, Saliva)  Runny Nose     Itchy eyes and hand swelling        Lisinopril  Cough     Other [Unlisted Allergen (Include Detail In Comments)]  Angioedema     Parsnip        Past medical history, past surgical history, current medications and allergies reviewed and accurate to the best of my knowledge.        ROS:  Refer to HPI    /88 (Cuff Site: Left Arm, Position: Sitting, Cuff Size: Adult Large)  Pulse 88  Temp 98.6  F (37  C) (Tympanic)   Wt 94.7 kg (208 lb 12.8 oz)  BMI 30.17 kg/m2    EXAM:  General Appearance: Well appearing adult male, appropriate appearance for age. No acute distress  Head: normocephalic, atraumatic  Eyes: Right bulbar and palpebral conjunctivae with erythema and irritation, cornea clear, no visible abrasion, no visible foreign body appreciated, clear mucous drainage, no crusting, minimal eyelid swelling, no preseptal tenderness to palpation.  Left conjunctivae normal without erythema or irritation, no drainage or crusting, no eyelid swelling.  PERRLA.  Tetracaine eye drops - 1 drop applied to right eye.  Patient declines Fluorescein staining.    Respiratory: normal chest wall and respirations.  Normal effort.  No cough appreciated.  Musculoskeletal:  Normal gait.  Equal movement of bilateral upper extremities.  Equal movement of bilateral lower extremities.    Dermatological: no rashes noted of exposed skin  Psychological: normal affect,  alert and pleasant      ASSESSMENT/PLAN:    ICD-10-CM    1. Irritation of right eye H57.8 ofloxacin 0.3 % ophthalmic (OCUFLOX) 0.3 % ophthalmic solution   2. Needs flu shot Z23 FLU VACCINE => 3 YRS PF QUADRIVALENT IIV4 IM         Ofloxacin eye drops QID to right eye until resolved, anticipate 3 to 7 days  Cool moist compresses PRN  Lubricating eye drops PRN  Follow up if symptoms persist or worsen or concerns          Patient Instructions   Ofloxacin eye drops 4 x day until resolved    Cool moist compresses as needed    Lubricating eye drops as needed    Follow up if worsening or concerns

## 2018-02-13 NOTE — PATIENT INSTRUCTIONS
Patient Information     Patient Name MRN Sex Kiek Nowak [id number] Male 1980      Patient Instructions by Magnolia Barnett NP at 2018  7:45 PM     Author:  Magnolia Barnett NP  Service:  (none) Author Type:  PHYS- Nurse Practitioner     Filed:  2018  8:37 PM  Encounter Date:  2018 Status:  Addendum     :  Magnolia Barnett NP (PHYS- Nurse Practitioner)        Related Notes: Original Note by Magnolia Barnett NP (PHYS- Nurse Practitioner) filed at 2018  8:36 PM            Ofloxacin eye drops 4 x day until resolved    Cool moist compresses as needed    Lubricating eye drops as needed    Follow up if worsening or concerns

## 2018-02-13 NOTE — NURSING NOTE
Patient Information     Patient Name MRN Sex Kike Nowak [id number] Male 1980      Nursing Note by Eugene Walker at 2018  7:45 PM     Author:  Eugene Walker Service:  (none) Author Type:  (none)     Filed:  2018  8:25 PM Encounter Date:  2018 Status:  Signed     :  Eugene Walker            Patient presents to the [facility] with concerns of right eye irritation and pain. Patient states that he first started noticing the irritation about a week ago when he ate a raw carrot and his face and eye swelled. Patient last had ibuprofen and tylenol around 4 pm today.    Eugene Walker ....................  2018   8:15 PM

## 2018-02-13 NOTE — NURSING NOTE
Patient Information     Patient Name MRN Sex Kike Nowak [id number] Male 1980      Nursing Note by Eugene Walker at 2018  7:45 PM     Author:  Eugene Walker Service:  (none) Author Type:  (none)     Filed:  2018  8:45 PM Encounter Date:  2018 Status:  Signed     :  Eugene Walker            Tetracaine Hydrochloride Opthalmic Solution 0.5 4mL ordered by Magnolia IBRAHIM.  Medication administered per written order by Magnolia IBRAHIM  Route: topical to eye  Lot # [id number]  Exp. 2019  NDC 7957-4684-44  Patient tolerated well.  Eugene Walker LPN..............2018 8:43 PM

## 2018-02-13 NOTE — TELEPHONE ENCOUNTER
Patient Information     Patient Name MRN Sex Kike Nowak [id number] Male 1980      Telephone Encounter by Ivet Martinez at 2018  9:43 AM     Author:  Ivet Martinez Service:  (none) Author Type:  (none)     Filed:  2018  9:48 AM Encounter Date:  2018 Status:  Signed     :  Ivet Martinez at [facility] said that they only have a 24 hour turn around time now on prescription PA's so she will need an answer before 3:05 today preferably this morning.  She said that this patient would need to try budesonide spray or flunisolide spray since they are on formulary instead of mometasone (Nasonex) nasal spray since it's not on formulary.  Please let me know what you will be prescribing so that I can let her know.  Ivet Martinez ....................  2018   9:46 AM

## 2018-02-13 NOTE — TELEPHONE ENCOUNTER
Patient Information     Patient Name MRN Kike Faria [id number] Male 1980      Telephone Encounter by Mariela Puentes at 2018 11:08 AM     Author:  Mariela Puentes Service:  (none) Author Type:  (none)     Filed:  2018 11:13 AM Encounter Date:  2018 Status:  Signed     :  Mariela Puentes            Orders pending.      Mariela Puentes LPN        2018 11:09 AM

## 2018-03-10 ENCOUNTER — OFFICE VISIT (OUTPATIENT)
Dept: FAMILY MEDICINE | Facility: OTHER | Age: 38
End: 2018-03-10
Attending: NURSE PRACTITIONER
Payer: COMMERCIAL

## 2018-03-10 VITALS
RESPIRATION RATE: 30 BRPM | DIASTOLIC BLOOD PRESSURE: 88 MMHG | BODY MASS INDEX: 30.82 KG/M2 | WEIGHT: 215.3 LBS | SYSTOLIC BLOOD PRESSURE: 136 MMHG | OXYGEN SATURATION: 98 % | HEIGHT: 70 IN | TEMPERATURE: 98.6 F | HEART RATE: 138 BPM

## 2018-03-10 DIAGNOSIS — R07.0 THROAT PAIN: ICD-10-CM

## 2018-03-10 DIAGNOSIS — H65.92 OME (OTITIS MEDIA WITH EFFUSION), LEFT: ICD-10-CM

## 2018-03-10 DIAGNOSIS — Z20.828 EXPOSURE TO THE FLU: ICD-10-CM

## 2018-03-10 DIAGNOSIS — R05.9 COUGH: Primary | ICD-10-CM

## 2018-03-10 LAB
DEPRECATED S PYO AG THROAT QL EIA: NORMAL
DEPRECATED S PYO AG THROAT QL EIA: NORMAL
FLUAV+FLUBV RNA SPEC QL NAA+PROBE: NEGATIVE
FLUAV+FLUBV RNA SPEC QL NAA+PROBE: NEGATIVE
RSV RNA SPEC NAA+PROBE: NEGATIVE
SPECIMEN SOURCE: NORMAL

## 2018-03-10 PROCEDURE — 87880 STREP A ASSAY W/OPTIC: CPT | Performed by: NURSE PRACTITIONER

## 2018-03-10 PROCEDURE — 87631 RESP VIRUS 3-5 TARGETS: CPT | Performed by: NURSE PRACTITIONER

## 2018-03-10 PROCEDURE — G0463 HOSPITAL OUTPT CLINIC VISIT: HCPCS

## 2018-03-10 PROCEDURE — 99213 OFFICE O/P EST LOW 20 MIN: CPT | Performed by: NURSE PRACTITIONER

## 2018-03-10 RX ORDER — BENZONATATE 200 MG/1
200 CAPSULE ORAL 3 TIMES DAILY PRN
Qty: 21 CAPSULE | Refills: 0 | Status: SHIPPED | OUTPATIENT
Start: 2018-03-10 | End: 2018-04-04

## 2018-03-10 ASSESSMENT — ENCOUNTER SYMPTOMS
ARTHRALGIAS: 0
CARDIOVASCULAR NEGATIVE: 1
WEAKNESS: 0
DIAPHORESIS: 1
SINUS PRESSURE: 1
LIGHT-HEADEDNESS: 1
FEVER: 0
WHEEZING: 0
FATIGUE: 1
SORE THROAT: 1
GASTROINTESTINAL NEGATIVE: 1
JOINT SWELLING: 0
TROUBLE SWALLOWING: 1
CHILLS: 1
EYE DISCHARGE: 1
APPETITE CHANGE: 0
SINUS PAIN: 1
CHEST TIGHTNESS: 0
SHORTNESS OF BREATH: 1
HEADACHES: 1
NECK PAIN: 1
COUGH: 1

## 2018-03-10 ASSESSMENT — PAIN SCALES - GENERAL: PAINLEVEL: SEVERE PAIN (6)

## 2018-03-10 NOTE — PATIENT INSTRUCTIONS
Your strep screen was negative, I don't have your flu results back yet but will call with your results. [phone] If you flu is positive I will send in for Tamiflu since your symptoms came on significantly yesterday.     I did order some tessalon 200 mg three times per day for your cough.     What am I to do (the plan):   O  Get plenty of fluids to drink.  O  Make sure that you get plenty of rest.   O  Take Acetaminophen (Tylenol) or Ibuprofen (Motrin or Advil) for fever or discomfort.  O  May try Diphenhydramine at bedtime only.  Do not use for more than 3-4 days in a row.  (Benadryl Allergy Elixir).     How will I know the plan above is not working:    O  If you have a fever above 102 degrees or more.    O  If you are having problems with your breathing (such as having breathing difficulty when you eat or talk)  O  If you get new symptoms that you are worried about  O  If you are not better within 6 days.      Who should I call if the plan is not working:  If you do not think you are getting better, your should contact your regular health care provider to discuss what you should do next.     When should I return for a follow-up visit:   Return as needed.

## 2018-03-10 NOTE — NURSING NOTE
Patient presents to the clinic for possible influenza. States over the past couple days he has felt run down, feels SOB, runny nose and chest congestion.   Enedina Dalton LPN............. March 10, 2018 12:42 PM

## 2018-03-10 NOTE — PROGRESS NOTES
SUBJECTIVE:   Kike Hess is a 37 year old male presenting with a chief complaint of   Chief Complaint   Patient presents with     Flu       He is an established patient of [facility].    Onset of symptoms was 1 day(s) ago.  Course of illness is worsening.    Severity moderate  Current and Associated symptoms: stuffy nose, cough - productive, shortness of breath, ear pain left, sore throat, facial pain/pressure and headache  Treatment measures tried include OTC Cough med.  Predisposing factors include No specific exposure to illness, but was at the [facility] recently. Had mild symptoms for a few days, the symptoms changed and got significantly worse yesterday.        Review of Systems   Constitutional: Positive for chills, diaphoresis and fatigue. Negative for appetite change and fever.   HENT: Positive for congestion, ear pain, sinus pain, sinus pressure, sore throat and trouble swallowing. Negative for hearing loss.    Eyes: Positive for discharge.   Respiratory: Positive for cough and shortness of breath. Negative for chest tightness and wheezing.    Cardiovascular: Negative.    Gastrointestinal: Negative.    Genitourinary: Negative.    Musculoskeletal: Positive for neck pain. Negative for arthralgias and joint swelling.   Skin: Negative for rash.   Neurological: Positive for light-headedness and headaches. Negative for weakness.       Past Medical History:   Diagnosis Date     Allergic rhinitis due to animal hair and dander     12/10/2013     Gastro-esophageal reflux disease without esophagitis     12/10/2013     Major depressive disorder, single episode     12/10/2013,Previously followed with Psychiatry - was on Remeron, Adderall, Lexapro in the past. Awaiting to re-establish psychiatry care again.     Migraine without status migrainosus, not intractable     12/10/2013     Other allergic rhinitis     12/10/2013     Other seasonal allergic rhinitis     12/10/2013     Sleep disorder     12/10/2013      Ulcerative colitis without complications (H)     8/9/2007     Family History   Problem Relation Age of Onset     Colon Cancer Paternal Grandmother      Cancer-colon     Colon Cancer Maternal Grandmother      Cancer-colon     Other - See Comments Other      Psychiatric illness,Aunt - suicide fall 2013     Current Outpatient Prescriptions   Medication Sig Dispense Refill     benzonatate (TESSALON) 200 MG capsule Take 1 capsule (200 mg) by mouth 3 times daily as needed for cough 21 capsule 0     amitriptyline (ELAVIL) 25 MG tablet Take 2 tablets by mouth At Bedtime       amLODIPine (NORVASC) 5 MG tablet Take 1 tablet by mouth daily       budesonide (RINOCORT AQUA) 32 MCG/ACT spray Spray 1 spray into both nostrils daily       amphetamine-dextroamphetamine (ADDERALL XR) 30 MG per 24 hr capsule Take 30 mg by mouth       amphetamine-dextroamphetamine (ADDERALL XR) 30 MG per 24 hr capsule Take 30 mg by mouth       escitalopram (LEXAPRO) 10 MG tablet Take 10 mg by mouth daily       LORazepam (ATIVAN) 0.5 MG tablet Take 0.5 mg by mouth 2 times daily as needed       losartan (COZAAR) 50 MG tablet Take 50 mg by mouth daily       mesalamine (DELZICOL) 400 MG CR capsule Take 2 capsules by mouth 3-4 times daily       methylPREDNISolone (MEDROL) 4 MG tablet TAKE AS DIRECTED FOR 10 DAYS FOR ULCERATIVE COLITIS FLAIR. REPEAT EVERY 4 WEEKS IF NEEDED. MAXIMUM OF 40 TABLETS MONTHLY       mometasone (NASONEX) 50 MCG/ACT spray Spray 1 spray into both nostrils daily Use generic covered nasal steroid       omeprazole (PRILOSEC) 20 MG CR capsule Take 1 capsule by mouth 30-60 minutes prior to 1st meal of the day       scopolamine (TRANSDERM) 72 hr patch 1 patch on dry, clean, hairless skin every 72 hours. For Motion Sickness       SUMAtriptan (IMITREX) 50 MG tablet Take 0.5-1 tablets by mouth every 2 hours as needed for migraine Max Dose 4 tablets per 24 hrs.       Social History   Substance Use Topics     Smoking status: Never Smoker      "Smokeless tobacco: Never Used     Alcohol use 0.0 oz/week      Comment: Alcoholic Drinks/day: ocassionally       OBJECTIVE  /88 (BP Location: Left arm, Patient Position: Sitting, Cuff Size: Adult Regular)  Pulse 138  Temp 98.6  F (37  C) (Tympanic)  Resp 30  Ht 5' 10\" (1.778 m)  Wt 215 lb 4.8 oz (97.7 kg)  SpO2 98%  BMI 30.89 kg/m2    Physical Exam   Constitutional: He is oriented to person, place, and time. He appears well-developed and well-nourished.   HENT:   Head: Normocephalic and atraumatic.   Right Ear: Tympanic membrane, external ear and ear canal normal.   Left Ear: External ear and ear canal normal. Tympanic membrane is erythematous. A middle ear effusion is present.   Mouth/Throat: Oropharynx is clear and moist.   Eyes: Conjunctivae are normal.   Neck: Neck supple.   Cardiovascular: Regular rhythm and normal heart sounds.    Pulmonary/Chest: Effort normal and breath sounds normal. No respiratory distress. He has no wheezes. He has no rales.   Musculoskeletal: Normal range of motion.   Lymphadenopathy:     He has cervical adenopathy.   Neurological: He is alert and oriented to person, place, and time.   Skin: Skin is warm and dry. He is not diaphoretic.   Psychiatric: He has a normal mood and affect. His behavior is normal.       Labs:  Results for orders placed or performed in visit on 03/10/18 (from the past 24 hour(s))   Strep, Rapid Screen   Result Value Ref Range    Specimen Description Throat     Rapid Strep A Screen Canceled, Test credited    Influenza A and B and RSV PCR   Result Value Ref Range    Specimen Description Nasopharyngeal     Influenza A PCR Negative NEG^Negative    Influenza B PCR Negative NEG^Negative    Resp Syncytial Virus Negative NEG^Negative   Strep, Rapid Screen   Result Value Ref Range    Specimen Description Throat     Rapid Strep A Screen       Negative presumptive for Group A Beta Streptococcus       X-Ray was not done.    ASSESSMENT:      ICD-10-CM    1. Cough " R05 Influenza A and B and RSV PCR     benzonatate (TESSALON) 200 MG capsule   2. Exposure to the flu Z20.828 Influenza A and B and RSV PCR     Strep, Rapid Screen     CANCELED: Influenza A and B and RSV PCR   3. Throat pain R07.0 Influenza A and B and RSV PCR     Strep, Rapid Screen   4. OME (otitis media with effusion), left H65.92         Medical Decision Making:    Differential Diagnosis:  URI Adult/Peds:  Acute left otitis media, Bronchitis-viral, Influenza and Viral upper respiratory illness    Serious Comorbid Conditions:  Adult:  None    PLAN:    URI Adult:  Tylenol, Ibuprofen, Fluids, Rest, OTC decongestant/antihistamine, Saline gargles and Saline nasal spray.  Rx for tessalon for cough. Will send in Rx for Tamiflu if PCR is positive.     Followup:    If not improving or if condition worsens, follow up with your Primary Care Provider    1600 Called patient with results of influenza testing - negative for influenza A, B and RVS. Pt with no further questions.     Patient Instructions   Your strep screen was negative, I don't have your flu results back yet but will call with your results. [phone] If you flu is positive I will send in for Tamiflu since your symptoms came on significantly yesterday.     I did order some tessalon 200 mg three times per day for your cough.     What am I to do (the plan):   O  Get plenty of fluids to drink.  O  Make sure that you get plenty of rest.   O  Take Acetaminophen (Tylenol) or Ibuprofen (Motrin or Advil) for fever or discomfort.  O  May try Diphenhydramine at bedtime only.  Do not use for more than 3-4 days in a row.  (Benadryl Allergy Elixir).     How will I know the plan above is not working:    O  If you have a fever above 102 degrees or more.    O  If you are having problems with your breathing (such as having breathing difficulty when you eat or talk)  O  If you get new symptoms that you are worried about  O  If you are not better within 6 days.      Who should I  call if the plan is not working:  If you do not think you are getting better, your should contact your regular health care provider to discuss what you should do next.     When should I return for a follow-up visit:   Return as needed.

## 2018-03-10 NOTE — MR AVS SNAPSHOT
After Visit Summary   3/10/2018    Kike Hess    MRN: [id number]           Patient Information     Date Of Birth          1980        Visit Information        Provider Department      3/10/2018 11:15 AM Lizzette March APRN CNP [facility]        Today's Diagnoses     Cough    -  1    Exposure to the flu        Throat pain        OME (otitis media with effusion), left          Care Instructions    Your strep screen was negative, I don't have your flu results back yet but will call with your results. [phone] If you flu is positive I will send in for Tamiflu since your symptoms came on significantly yesterday.     I did order some tessalon 200 mg three times per day for your cough.     What am I to do (the plan):   O  Get plenty of fluids to drink.  O  Make sure that you get plenty of rest.   O  Take Acetaminophen (Tylenol) or Ibuprofen (Motrin or Advil) for fever or discomfort.  O  May try Diphenhydramine at bedtime only.  Do not use for more than 3-4 days in a row.  (Benadryl Allergy Elixir).     How will I know the plan above is not working:    O  If you have a fever above 102 degrees or more.    O  If you are having problems with your breathing (such as having breathing difficulty when you eat or talk)  O  If you get new symptoms that you are worried about  O  If you are not better within 6 days.      Who should I call if the plan is not working:  If you do not think you are getting better, your should contact your regular health care provider to discuss what you should do next.     When should I return for a follow-up visit:   Return as needed.            Follow-ups after your visit        Your next 10 appointments already scheduled     Apr 04, 2018  8:00 AM CDT   SHORT with Jarad Salcido MD   [facility] ([facility])    [address]  [place] [id number] [phone]              Who to contact      "If you have questions or need follow up information about today's clinic visit or your schedule please contact [facility] directly at [phone].  Normal or non-critical lab and imaging results will be communicated to you by [employer], letter or phone within 4 business days after the clinic has received the results. If you do not hear from us within 7 days, please contact the clinic through [employer] or phone. If you have a critical or abnormal lab result, we will notify you by phone as soon as possible.  Submit refill requests through [employer] or call your pharmacy and they will forward the refill request to us. Please allow 3 business days for your refill to be completed.          Additional Information About Your Visit        [employer] Information     [employer] lets you send messages to your doctor, view your test results, renew your prescriptions, schedule appointments and more. To sign up, go to [email or web address] . Click on \"Log in\" on the left side of the screen, which will take you to the Welcome page. Then click on \"Sign up Now\" on the right side of the page.     You will be asked to enter the access code listed below, as well as some personal information. Please follow the directions to create your username and password.     Your access code is: [email or web address]  Expires: 2018  2:28 PM     Your access code will  in 90 days. If you need help or a new code, please call your [facility] clinic or [phone].        Care EveryWhere ID     This is your Care EveryWhere ID. This could be used by other organizations to access your [facility] medical records  [id number]        Your Vitals Were     Pulse Temperature Respirations Height Pulse Oximetry BMI (Body Mass Index)    138 98.6  F (37  C) (Tympanic) 30 5' 10\" (1.778 m) 98% 30.89 kg/m2       Blood Pressure from Last 3 Encounters:   03/10/18 136/88   18 132/88   18 120/72    Weight from Last 3 Encounters:   03/10/18 215 lb " 4.8 oz (97.7 kg)   01/18/18 208 lb 12.8 oz (94.7 kg)   01/03/18 196 lb 4 oz (89 kg)              We Performed the Following     Influenza A and B and RSV PCR     Strep, Rapid Screen     Strep, Rapid Screen          Today's Medication Changes          These changes are accurate as of 3/10/18  2:28 PM.  If you have any questions, ask your nurse or doctor.               Start taking these medicines.        Dose/Directions    benzonatate 200 MG capsule   Commonly known as:  TESSALON   Used for:  Cough   Started by:  Lizzette March APRN CNP        Dose:  200 mg   Take 1 capsule (200 mg) by mouth 3 times daily as needed for cough   Quantity:  21 capsule   Refills:  0            Where to get your medicines      These medications were sent to [id number] - [place], [place] - [address], [facility] [address]     Phone:  [phone]     benzonatate 200 MG capsule                Primary Care Provider Office Phone # Fax #    Jarad Salcido -[phone]       [address] [facility] [address]        Equal Access to Services     [facility] AH: Hadii aad adriana hadasho Soryanali, waaxda luqadaha, qaybta kaalmada adeegyada, pawel chun . So [facility] [phone].    ATENCIÓN: Si habla español, tiene a raymundo disposición servicios gratuitos de asistencia lingüística. Llame al [phone].    We comply with applicable federal civil rights laws and [place] laws. We do not discriminate on the basis of race, color, national origin, age, disability, sex, sexual orientation, or gender identity.            Thank you!     Thank you for choosing [facility]  for your care. Our goal is always to provide you with excellent care. Hearing back from our patients is one way we can continue to improve our services. Please take a few minutes to complete the written survey that you may receive in the mail after your  visit with us. Thank you!             Your Updated Medication List - Protect others around you: Learn how to safely use, store and throw away your medicines at www.disposemymeds.org.          This list is accurate as of 3/10/18  2:28 PM.  Always use your most recent med list.                   Brand Name Dispense Instructions for use Diagnosis    amitriptyline 25 MG tablet    ELAVIL     Take 2 tablets by mouth At Bedtime        amLODIPine 5 MG tablet    NORVASC     Take 1 tablet by mouth daily        * amphetamine-dextroamphetamine 30 MG per 24 hr capsule    ADDERALL XR     Take 30 mg by mouth        * amphetamine-dextroamphetamine 30 MG per 24 hr capsule    ADDERALL XR     Take 30 mg by mouth        benzonatate 200 MG capsule    TESSALON    21 capsule    Take 1 capsule (200 mg) by mouth 3 times daily as needed for cough    Cough       budesonide 32 MCG/ACT spray    RINOCORT AQUA     Spray 1 spray into both nostrils daily        escitalopram 10 MG tablet    LEXAPRO     Take 10 mg by mouth daily        LORazepam 0.5 MG tablet    ATIVAN     Take 0.5 mg by mouth 2 times daily as needed        losartan 50 MG tablet    COZAAR     Take 50 mg by mouth daily        mesalamine 400 MG CR capsule    DELZICOL     Take 2 capsules by mouth 3-4 times daily        methylPREDNISolone 4 MG tablet    MEDROL     TAKE AS DIRECTED FOR 10 DAYS FOR ULCERATIVE COLITIS FLAIR. REPEAT EVERY 4 WEEKS IF NEEDED. MAXIMUM OF 40 TABLETS MONTHLY        mometasone 50 MCG/ACT spray    NASONEX     Spray 1 spray into both nostrils daily Use generic covered nasal steroid        omeprazole 20 MG CR capsule    priLOSEC     Take 1 capsule by mouth 30-60 minutes prior to 1st meal of the day        scopolamine 72 hr patch    TRANSDERM     1 patch on dry, clean, hairless skin every 72 hours. For Motion Sickness        SUMAtriptan 50 MG tablet    IMITREX     Take 0.5-1 tablets by mouth every 2 hours as needed for migraine Max Dose 4 tablets per 24 hrs.        *  Notice:  This list has 2 medication(s) that are the same as other medications prescribed for you. Read the directions carefully, and ask your doctor or other care provider to review them with you.

## 2018-03-13 ENCOUNTER — OFFICE VISIT (OUTPATIENT)
Dept: FAMILY MEDICINE | Facility: OTHER | Age: 38
End: 2018-03-13
Attending: FAMILY MEDICINE
Payer: COMMERCIAL

## 2018-03-13 VITALS
DIASTOLIC BLOOD PRESSURE: 68 MMHG | TEMPERATURE: 99 F | OXYGEN SATURATION: 97 % | SYSTOLIC BLOOD PRESSURE: 126 MMHG | BODY MASS INDEX: 29.41 KG/M2 | RESPIRATION RATE: 16 BRPM | WEIGHT: 205 LBS | HEART RATE: 136 BPM

## 2018-03-13 DIAGNOSIS — B34.9 VIRAL SYNDROME: Primary | ICD-10-CM

## 2018-03-13 PROCEDURE — 99213 OFFICE O/P EST LOW 20 MIN: CPT | Performed by: FAMILY MEDICINE

## 2018-03-13 PROCEDURE — G0463 HOSPITAL OUTPT CLINIC VISIT: HCPCS

## 2018-03-13 RX ORDER — ALBUTEROL SULFATE 90 UG/1
2 AEROSOL, METERED RESPIRATORY (INHALATION) EVERY 6 HOURS PRN
Qty: 1 INHALER | Refills: 1 | Status: SHIPPED | OUTPATIENT
Start: 2018-03-13 | End: 2018-04-04

## 2018-03-13 RX ORDER — CODEINE PHOSPHATE AND GUAIFENESIN 10; 100 MG/5ML; MG/5ML
1 SOLUTION ORAL EVERY 4 HOURS PRN
Qty: 236 ML | Refills: 0 | Status: SHIPPED | OUTPATIENT
Start: 2018-03-13 | End: 2018-04-04

## 2018-03-13 ASSESSMENT — ENCOUNTER SYMPTOMS
SHORTNESS OF BREATH: 0
FEVER: 0
COUGH: 1
RHINORRHEA: 1
SORE THROAT: 1
EYE DISCHARGE: 1
EYE ITCHING: 1

## 2018-03-13 ASSESSMENT — ANXIETY QUESTIONNAIRES
2. NOT BEING ABLE TO STOP OR CONTROL WORRYING: NOT AT ALL
1. FEELING NERVOUS, ANXIOUS, OR ON EDGE: NOT AT ALL
7. FEELING AFRAID AS IF SOMETHING AWFUL MIGHT HAPPEN: NOT AT ALL
GAD7 TOTAL SCORE: 0
6. BECOMING EASILY ANNOYED OR IRRITABLE: NOT AT ALL
3. WORRYING TOO MUCH ABOUT DIFFERENT THINGS: NOT AT ALL
IF YOU CHECKED OFF ANY PROBLEMS ON THIS QUESTIONNAIRE, HOW DIFFICULT HAVE THESE PROBLEMS MADE IT FOR YOU TO DO YOUR WORK, TAKE CARE OF THINGS AT HOME, OR GET ALONG WITH OTHER PEOPLE: NOT DIFFICULT AT ALL
5. BEING SO RESTLESS THAT IT IS HARD TO SIT STILL: NOT AT ALL

## 2018-03-13 ASSESSMENT — PATIENT HEALTH QUESTIONNAIRE - PHQ9: 5. POOR APPETITE OR OVEREATING: NOT AT ALL

## 2018-03-13 ASSESSMENT — PAIN SCALES - GENERAL: PAINLEVEL: SEVERE PAIN (6)

## 2018-03-13 NOTE — MR AVS SNAPSHOT
"              After Visit Summary   3/13/2018    Kike Hess    MRN: [id number]           Patient Information     Date Of Birth          1980        Visit Information        Provider Department      3/13/2018 8:15 AM Gustavo Verma MD [facility]        Today's Diagnoses     Viral syndrome    -  1       Follow-ups after your visit        Your next 10 appointments already scheduled     Apr 04, 2018  8:00 AM CDT   SHORT with Jarad Salcido MD   [facility] ([facility])    [address]  [place] [id number] [phone]              Who to contact     If you have questions or need follow up information about today's clinic visit or your schedule please contact [facility] directly at [phone].  Normal or non-critical lab and imaging results will be communicated to you by [employer], letter or phone within 4 business days after the clinic has received the results. If you do not hear from us within 7 days, please contact the clinic through [employer] or phone. If you have a critical or abnormal lab result, we will notify you by phone as soon as possible.  Submit refill requests through [employer] or call your pharmacy and they will forward the refill request to us. Please allow 3 business days for your refill to be completed.          Additional Information About Your Visit        MyChart Information     [employer] lets you send messages to your doctor, view your test results, renew your prescriptions, schedule appointments and more. To sign up, go to [email or web address] . Click on \"Log in\" on the left side of the screen, which will take you to the Welcome page. Then click on \"Sign up Now\" on the right side of the page.     You will be asked to enter the access code listed below, as well as some personal information. Please follow the directions to create your username and password.     Your access code is: " [email or web address]  Expires: 2018  3:28 PM     Your access code will  in 90 days. If you need help or a new code, please call your [place] clinic or [phone].        Care EveryWhere ID     This is your Care EveryWhere ID. This could be used by other organizations to access your [place] medical records  [id number]        Your Vitals Were     Pulse Temperature Respirations Pulse Oximetry BMI (Body Mass Index)       136 99  F (37.2  C) 16 97% 29.41 kg/m2        Blood Pressure from Last 3 Encounters:   18 126/68   03/10/18 136/88   18 132/88    Weight from Last 3 Encounters:   18 205 lb (93 kg)   03/10/18 215 lb 4.8 oz (97.7 kg)   18 208 lb 12.8 oz (94.7 kg)              Today, you had the following     No orders found for display         Today's Medication Changes          These changes are accurate as of 3/13/18  8:43 AM.  If you have any questions, ask your nurse or doctor.               Start taking these medicines.        Dose/Directions    albuterol 108 (90 BASE) MCG/ACT Inhaler   Commonly known as:  PROAIR HFA/PROVENTIL HFA/VENTOLIN HFA   Used for:  Viral syndrome   Started by:  Gustavo Verma MD        Dose:  2 puff   Inhale 2 puffs into the lungs every 6 hours as needed for shortness of breath / dyspnea or wheezing   Quantity:  1 Inhaler   Refills:  1       guaiFENesin-codeine 100-10 MG/5ML Soln solution   Commonly known as:  ROBITUSSIN AC   Used for:  Viral syndrome   Started by:  Gustavo Verma MD        Dose:  1 tsp.   Take 5 mLs by mouth every 4 hours as needed for cough   Quantity:  236 mL   Refills:  0            Where to get your medicines      These medications were sent to [facility], - [place] - [address]  [address], [place] [address]     Phone:  [phone]     albuterol 108 (90 BASE) MCG/ACT Inhaler         Some of these will need a paper prescription and others can be bought over the counter.  Ask your  nurse if you have questions.     Bring a paper prescription for each of these medications     guaiFENesin-codeine 100-10 MG/5ML Soln solution                Primary Care Provider Office Phone # Fax #    Jarad Salcido [phone] [address]   [facility] [place] [address]        Equal Access to Services     DAMARIS GUADARRAMA : Hadii aad ku hadasho Soomaali, waaxda luqadaha, qaybta kaalmada adeegyada, waxay ynesin hayaan adechau kharadelores chun . So [facility] [phone].    ATENCIÓN: Si habla español, tiene a raymundo disposición servicios gratuitos de asistencia lingüística. Llame al [phone].    We comply with applicable federal civil rights laws and [place] laws. We do not discriminate on the basis of race, color, national origin, age, disability, sex, sexual orientation, or gender identity.            Thank you!     Thank you for choosing [facility]  for your care. Our goal is always to provide you with excellent care. Hearing back from our patients is one way we can continue to improve our services. Please take a few minutes to complete the written survey that you may receive in the mail after your visit with us. Thank you!             Your Updated Medication List - Protect others around you: Learn how to safely use, store and throw away your medicines at www.disposemymeds.org.          This list is accurate as of 3/13/18  8:43 AM.  Always use your most recent med list.                   Brand Name Dispense Instructions for use Diagnosis    albuterol 108 (90 BASE) MCG/ACT Inhaler    PROAIR HFA/PROVENTIL HFA/VENTOLIN HFA    1 Inhaler    Inhale 2 puffs into the lungs every 6 hours as needed for shortness of breath / dyspnea or wheezing    Viral syndrome       amitriptyline 25 MG tablet    ELAVIL     Take 2 tablets by mouth At Bedtime        amLODIPine 5 MG tablet    NORVASC     Take 1 tablet by mouth daily        * amphetamine-dextroamphetamine 30 MG per 24 hr capsule     ADDERALL XR     Take 30 mg by mouth        * amphetamine-dextroamphetamine 30 MG per 24 hr capsule    ADDERALL XR     Take 30 mg by mouth        benzonatate 200 MG capsule    TESSALON    21 capsule    Take 1 capsule (200 mg) by mouth 3 times daily as needed for cough    Cough       budesonide 32 MCG/ACT spray    RINOCORT AQUA     Spray 1 spray into both nostrils daily        escitalopram 10 MG tablet    LEXAPRO     Take 10 mg by mouth daily        guaiFENesin-codeine 100-10 MG/5ML Soln solution    ROBITUSSIN AC    236 mL    Take 5 mLs by mouth every 4 hours as needed for cough    Viral syndrome       LORazepam 0.5 MG tablet    ATIVAN     Take 0.5 mg by mouth 2 times daily as needed        losartan 50 MG tablet    COZAAR     Take 50 mg by mouth daily        mesalamine 400 MG CR capsule    DELZICOL     Take 2 capsules by mouth 3-4 times daily        methylPREDNISolone 4 MG tablet    MEDROL     TAKE AS DIRECTED FOR 10 DAYS FOR ULCERATIVE COLITIS FLAIR. REPEAT EVERY 4 WEEKS IF NEEDED. MAXIMUM OF 40 TABLETS MONTHLY        mometasone 50 MCG/ACT spray    NASONEX     Spray 1 spray into both nostrils daily Use generic covered nasal steroid        omeprazole 20 MG CR capsule    priLOSEC     Take 1 capsule by mouth 30-60 minutes prior to 1st meal of the day        scopolamine 72 hr patch    TRANSDERM     1 patch on dry, clean, hairless skin every 72 hours. For Motion Sickness        SUMAtriptan 50 MG tablet    IMITREX     Take 0.5-1 tablets by mouth every 2 hours as needed for migraine Max Dose 4 tablets per 24 hrs.        * Notice:  This list has 2 medication(s) that are the same as other medications prescribed for you. Read the directions carefully, and ask your doctor or other care provider to review them with you.

## 2018-03-13 NOTE — PROGRESS NOTES
SUBJECTIVE:   Kike Hess is a 37 year old male who presents to clinic today for the following health issues: cough and congestion    HPI Comments: Kike Hess is a pleasant 37 year old male who presents today for continuing cough and congestion. He was seen at the [facility] on 3/10 for these same symptoms and was tested for influenza, RSV, and strep which all came back negative. His symptoms started on 3/8. His cough seems to be getting worse, it is contiunous. His L ear still feels very plugged. His eyes just started watering yesterday, mostly clear drainage but they are fairly crusty in the morning. It also hurts for him to swallow. He has tried dextromethorphan for coughing but this does not help. He took yesterday off of work because he felt terrible. No fevers or body aches.               Cough   Associated symptoms include ear pain, rhinorrhea and sore throat. Pertinent negatives include no shortness of breath.     Patient Active Problem List    Diagnosis Date Noted     Anxiety 12/01/2015     Priority: Medium     Attention deficit hyperactivity disorder (ADHD), combined type 12/01/2015     Priority: Medium     Controlled substance agreement signed 12/01/2015     Priority: Medium     Migraine without aura and without status migrainosus, not intractable 06/19/2015     Priority: Medium     Hypertension 08/13/2014     Priority: Medium     Motion sickness 08/13/2014     Priority: Medium     Sprain of left thumb 08/13/2014     Priority: Medium     Social phobia 12/19/2013     Priority: Medium     Allergic rhinitis due to cat hair 12/10/2013     Priority: Medium     Dust allergy 12/10/2013     Priority: Medium     Elevated liver enzymes 12/10/2013     Priority: Medium     GERD (gastroesophageal reflux disease) 12/10/2013     Priority: Medium     Hair loss 12/10/2013     Priority: Medium     Hyperglycemia 12/10/2013     Priority: Medium     Lateral epicondylitis 12/10/2013     Priority: Medium      Major depression 12/10/2013     Priority: Medium     Medial epicondylitis of elbow 12/10/2013     Priority: Medium     Seasonal allergic rhinitis 12/10/2013     Priority: Medium     Sleeping difficulties 12/10/2013     Priority: Medium     Ulcerative colitis (H) 08/09/2007     Priority: Medium     Past Medical History:   Diagnosis Date     Allergic rhinitis due to animal hair and dander     12/10/2013     Gastro-esophageal reflux disease without esophagitis     12/10/2013     Major depressive disorder, single episode     12/10/2013,Previously followed with Psychiatry - was on Remeron, Adderall, Lexapro in the past. Awaiting to re-establish psychiatry care again.     Migraine without status migrainosus, not intractable     12/10/2013     Other allergic rhinitis     12/10/2013     Other seasonal allergic rhinitis     12/10/2013     Sleep disorder     12/10/2013     Ulcerative colitis without complications (H)     8/9/2007      Past Surgical History:   Procedure Laterality Date     APPENDECTOMY OPEN      2/4/13,Dr. Givens/[facility]     Current Outpatient Prescriptions   Medication Sig Dispense Refill     guaiFENesin-codeine (ROBITUSSIN AC) 100-10 MG/5ML SOLN solution Take 5 mLs by mouth every 4 hours as needed for cough 236 mL 0     albuterol (PROAIR HFA/PROVENTIL HFA/VENTOLIN HFA) 108 (90 BASE) MCG/ACT Inhaler Inhale 2 puffs into the lungs every 6 hours as needed for shortness of breath / dyspnea or wheezing 1 Inhaler 1     benzonatate (TESSALON) 200 MG capsule Take 1 capsule (200 mg) by mouth 3 times daily as needed for cough 21 capsule 0     amitriptyline (ELAVIL) 25 MG tablet Take 2 tablets by mouth At Bedtime       amLODIPine (NORVASC) 5 MG tablet Take 1 tablet by mouth daily       budesonide (RINOCORT AQUA) 32 MCG/ACT spray Spray 1 spray into both nostrils daily       amphetamine-dextroamphetamine (ADDERALL XR) 30 MG per 24 hr capsule Take 30 mg by mouth       amphetamine-dextroamphetamine (ADDERALL XR) 30 MG per  24 hr capsule Take 30 mg by mouth       escitalopram (LEXAPRO) 10 MG tablet Take 10 mg by mouth daily       LORazepam (ATIVAN) 0.5 MG tablet Take 0.5 mg by mouth 2 times daily as needed       losartan (COZAAR) 50 MG tablet Take 50 mg by mouth daily       mesalamine (DELZICOL) 400 MG CR capsule Take 2 capsules by mouth 3-4 times daily       methylPREDNISolone (MEDROL) 4 MG tablet TAKE AS DIRECTED FOR 10 DAYS FOR ULCERATIVE COLITIS FLAIR. REPEAT EVERY 4 WEEKS IF NEEDED. MAXIMUM OF 40 TABLETS MONTHLY       mometasone (NASONEX) 50 MCG/ACT spray Spray 1 spray into both nostrils daily Use generic covered nasal steroid       omeprazole (PRILOSEC) 20 MG CR capsule Take 1 capsule by mouth 30-60 minutes prior to 1st meal of the day       scopolamine (TRANSDERM) 72 hr patch 1 patch on dry, clean, hairless skin every 72 hours. For Motion Sickness       SUMAtriptan (IMITREX) 50 MG tablet Take 0.5-1 tablets by mouth every 2 hours as needed for migraine Max Dose 4 tablets per 24 hrs.         Review of Systems   Constitutional: Negative for fever.   HENT: Positive for congestion, ear pain, postnasal drip, rhinorrhea and sore throat.    Eyes: Positive for discharge and itching.   Respiratory: Positive for cough. Negative for shortness of breath.       OBJECTIVE:     /68 (BP Location: Right arm, Patient Position: Sitting, Cuff Size: Adult Regular)  Pulse 136  Temp 99  F (37.2  C)  Resp 16  Wt 205 lb (93 kg)  SpO2 97%  BMI 29.41 kg/m2  Body mass index is 29.41 kg/(m^2).  Physical Exam   Constitutional: He is oriented to person, place, and time. He appears well-developed and well-nourished. No distress.   HENT:   Head: Normocephalic and atraumatic.   Right Ear: External ear normal.   Mouth/Throat: Oropharynx is clear and moist. No oropharyngeal exudate.   Left tympanic membrane: No bulging or erythema. Effusion present.   Right tympanic membrane: No bulging, effusion or erythema   Eyes: Pupils are equal, round, and reactive  to light. Right eye exhibits discharge. Left eye exhibits discharge.   Injected conjuctiva   Neck:   Mildly enlarged thyroid   Cardiovascular: Regular rhythm and normal heart sounds.  Exam reveals no gallop and no friction rub.    No murmur heard.  tachycardic   Pulmonary/Chest: Effort normal and breath sounds normal. He has no wheezes. He has no rales.   Lymphadenopathy:     He has no cervical adenopathy.   Neurological: He is alert and oriented to person, place, and time.   Skin: He is not diaphoretic.   Psychiatric: He has a normal mood and affect. His behavior is normal.     Diagnostic Test Results:  none     ASSESSMENT/PLAN:     (B34.9) Viral syndrome  (primary encounter diagnosis)  Comment:   Plan: guaiFENesin-codeine (ROBITUSSIN AC) 100-10         MG/5ML SOLN solution, albuterol (PROAIR         HFA/PROVENTIL HFA/VENTOLIN HFA) 108 (90 BASE)         MCG/ACT Inhaler       Most likely viral especially with the new onset eye discharge. Follow up if a fever develops.     Forwarded to Dr. Verma for review.   Clarisa Mcfarlane on 3/13/2018 at 9:23 AM      Gustavo Verma MD  [facility]    Pt was seen and examined by me as well as Clarisa Mcfarlane, MS 3.  See her note for details.    Gustavo Verma MD on 3/14/2018 at 7:53 AM

## 2018-03-13 NOTE — NURSING NOTE
Coming in with an ongoing cough with a sore Lt ear, watery eyes  Jocy Garza LPN on 3/13/2018 at 8:18 AM

## 2018-03-14 ASSESSMENT — ANXIETY QUESTIONNAIRES: GAD7 TOTAL SCORE: 0

## 2018-04-04 ENCOUNTER — OFFICE VISIT (OUTPATIENT)
Dept: INTERNAL MEDICINE | Facility: OTHER | Age: 38
End: 2018-04-04
Attending: INTERNAL MEDICINE
Payer: COMMERCIAL

## 2018-04-04 VITALS
HEART RATE: 76 BPM | WEIGHT: 205 LBS | SYSTOLIC BLOOD PRESSURE: 128 MMHG | DIASTOLIC BLOOD PRESSURE: 70 MMHG | BODY MASS INDEX: 29.41 KG/M2

## 2018-04-04 DIAGNOSIS — F90.2 ATTENTION DEFICIT HYPERACTIVITY DISORDER (ADHD), COMBINED TYPE: ICD-10-CM

## 2018-04-04 DIAGNOSIS — Z23 NEED FOR TDAP VACCINATION: ICD-10-CM

## 2018-04-04 DIAGNOSIS — T75.3XXD MOTION SICKNESS, SUBSEQUENT ENCOUNTER: ICD-10-CM

## 2018-04-04 DIAGNOSIS — F32.5 MAJOR DEPRESSION IN COMPLETE REMISSION (H): ICD-10-CM

## 2018-04-04 DIAGNOSIS — Z82.49 FHX: CORONARY ARTERY DISEASE: ICD-10-CM

## 2018-04-04 DIAGNOSIS — I10 BENIGN ESSENTIAL HYPERTENSION: Primary | ICD-10-CM

## 2018-04-04 DIAGNOSIS — G43.009 MIGRAINE WITHOUT AURA AND WITHOUT STATUS MIGRAINOSUS, NOT INTRACTABLE: ICD-10-CM

## 2018-04-04 DIAGNOSIS — R73.9 ELEVATED RANDOM BLOOD GLUCOSE LEVEL: ICD-10-CM

## 2018-04-04 DIAGNOSIS — K52.9 INFLAMMATORY BOWEL DISEASE: ICD-10-CM

## 2018-04-04 DIAGNOSIS — R12 HEARTBURN: ICD-10-CM

## 2018-04-04 DIAGNOSIS — Z79.899 CONTROLLED SUBSTANCE AGREEMENT SIGNED: ICD-10-CM

## 2018-04-04 LAB
ALBUMIN SERPL-MCNC: 4.9 G/DL (ref 3.5–5.7)
ALP SERPL-CCNC: 75 U/L (ref 34–104)
ALT SERPL W P-5'-P-CCNC: 81 U/L (ref 7–52)
ANION GAP SERPL CALCULATED.3IONS-SCNC: 13 MMOL/L (ref 3–14)
AST SERPL W P-5'-P-CCNC: 60 U/L (ref 13–39)
BASOPHILS # BLD AUTO: 0 10E9/L (ref 0–0.2)
BASOPHILS NFR BLD AUTO: 0.6 %
BILIRUB SERPL-MCNC: 0.3 MG/DL (ref 0.3–1)
BUN SERPL-MCNC: 10 MG/DL (ref 7–25)
CALCIUM SERPL-MCNC: 10.4 MG/DL (ref 8.6–10.3)
CHLORIDE SERPL-SCNC: 103 MMOL/L (ref 98–107)
CHOLEST SERPL-MCNC: 346 MG/DL
CO2 SERPL-SCNC: 26 MMOL/L (ref 21–31)
CREAT SERPL-MCNC: 0.97 MG/DL (ref 0.7–1.3)
DIFFERENTIAL METHOD BLD: NORMAL
EOSINOPHIL # BLD AUTO: 0.1 10E9/L (ref 0–0.7)
EOSINOPHIL NFR BLD AUTO: 1.5 %
ERYTHROCYTE [DISTWIDTH] IN BLOOD BY AUTOMATED COUNT: 12.8 % (ref 10–15)
GFR SERPL CREATININE-BSD FRML MDRD: 87 ML/MIN/1.7M2
GLUCOSE SERPL-MCNC: 112 MG/DL (ref 70–105)
HBA1C MFR BLD: 5.5 % (ref 4–6)
HCT VFR BLD AUTO: 49.8 % (ref 40–53)
HDLC SERPL-MCNC: 50 MG/DL (ref 23–92)
HGB BLD-MCNC: 17.1 G/DL (ref 13.3–17.7)
IMM GRANULOCYTES # BLD: 0 10E9/L (ref 0–0.4)
IMM GRANULOCYTES NFR BLD: 0.6 %
LDLC SERPL CALC-MCNC: ABNORMAL MG/DL
LYMPHOCYTES # BLD AUTO: 2.8 10E9/L (ref 0.8–5.3)
LYMPHOCYTES NFR BLD AUTO: 40.7 %
MCH RBC QN AUTO: 32.1 PG (ref 26.5–33)
MCHC RBC AUTO-ENTMCNC: 34.3 G/DL (ref 31.5–36.5)
MCV RBC AUTO: 93 FL (ref 78–100)
MONOCYTES # BLD AUTO: 0.6 10E9/L (ref 0–1.3)
MONOCYTES NFR BLD AUTO: 8.9 %
NEUTROPHILS # BLD AUTO: 3.3 10E9/L (ref 1.6–8.3)
NEUTROPHILS NFR BLD AUTO: 47.7 %
NONHDLC SERPL-MCNC: 296 MG/DL
PLATELET # BLD AUTO: 228 10E9/L (ref 150–450)
POTASSIUM SERPL-SCNC: 4.4 MMOL/L (ref 3.5–5.1)
PROT SERPL-MCNC: 7.6 G/DL (ref 6.4–8.9)
RBC # BLD AUTO: 5.33 10E12/L (ref 4.4–5.9)
SODIUM SERPL-SCNC: 142 MMOL/L (ref 134–144)
TRIGL SERPL-MCNC: 692 MG/DL
WBC # BLD AUTO: 6.8 10E9/L (ref 4–11)

## 2018-04-04 PROCEDURE — 80053 COMPREHEN METABOLIC PANEL: CPT | Performed by: INTERNAL MEDICINE

## 2018-04-04 PROCEDURE — 83036 HEMOGLOBIN GLYCOSYLATED A1C: CPT | Performed by: INTERNAL MEDICINE

## 2018-04-04 PROCEDURE — 90471 IMMUNIZATION ADMIN: CPT

## 2018-04-04 PROCEDURE — 90715 TDAP VACCINE 7 YRS/> IM: CPT | Performed by: INTERNAL MEDICINE

## 2018-04-04 PROCEDURE — G0463 HOSPITAL OUTPT CLINIC VISIT: HCPCS

## 2018-04-04 PROCEDURE — 99214 OFFICE O/P EST MOD 30 MIN: CPT | Performed by: INTERNAL MEDICINE

## 2018-04-04 PROCEDURE — 85025 COMPLETE CBC W/AUTO DIFF WBC: CPT | Performed by: INTERNAL MEDICINE

## 2018-04-04 PROCEDURE — 80061 LIPID PANEL: CPT | Performed by: INTERNAL MEDICINE

## 2018-04-04 PROCEDURE — G0463 HOSPITAL OUTPT CLINIC VISIT: HCPCS | Mod: 25

## 2018-04-04 PROCEDURE — 36415 COLL VENOUS BLD VENIPUNCTURE: CPT | Performed by: INTERNAL MEDICINE

## 2018-04-04 RX ORDER — AMLODIPINE BESYLATE 5 MG/1
5 TABLET ORAL DAILY
Qty: 90 TABLET | Refills: 3 | Status: SHIPPED | OUTPATIENT
Start: 2018-04-04 | End: 2019-03-01

## 2018-04-04 RX ORDER — MESALAMINE 400 MG/1
800 CAPSULE, DELAYED RELEASE ORAL 4 TIMES DAILY PRN
Qty: 240 CAPSULE | Refills: 11 | Status: SHIPPED | OUTPATIENT
Start: 2018-04-04 | End: 2019-03-01

## 2018-04-04 RX ORDER — DEXTROAMPHETAMINE SACCHARATE, AMPHETAMINE ASPARTATE MONOHYDRATE, DEXTROAMPHETAMINE SULFATE AND AMPHETAMINE SULFATE 7.5; 7.5; 7.5; 7.5 MG/1; MG/1; MG/1; MG/1
30 CAPSULE, EXTENDED RELEASE ORAL DAILY
Qty: 30 CAPSULE | Refills: 0 | Status: SHIPPED | OUTPATIENT
Start: 2018-04-04 | End: 2018-07-24

## 2018-04-04 RX ORDER — ESCITALOPRAM OXALATE 10 MG/1
10 TABLET ORAL DAILY
Qty: 90 TABLET | Refills: 3 | Status: SHIPPED | OUTPATIENT
Start: 2018-04-04 | End: 2019-03-01

## 2018-04-04 RX ORDER — DEXTROAMPHETAMINE SACCHARATE, AMPHETAMINE ASPARTATE MONOHYDRATE, DEXTROAMPHETAMINE SULFATE AND AMPHETAMINE SULFATE 7.5; 7.5; 7.5; 7.5 MG/1; MG/1; MG/1; MG/1
30 CAPSULE, EXTENDED RELEASE ORAL DAILY
Qty: 30 CAPSULE | Refills: 0 | Status: SHIPPED | OUTPATIENT
Start: 2018-06-05 | End: 2018-07-05

## 2018-04-04 RX ORDER — DEXTROAMPHETAMINE SACCHARATE, AMPHETAMINE ASPARTATE MONOHYDRATE, DEXTROAMPHETAMINE SULFATE AND AMPHETAMINE SULFATE 7.5; 7.5; 7.5; 7.5 MG/1; MG/1; MG/1; MG/1
30 CAPSULE, EXTENDED RELEASE ORAL DAILY
Qty: 30 CAPSULE | Refills: 0 | Status: SHIPPED | OUTPATIENT
Start: 2018-05-05 | End: 2018-06-04

## 2018-04-04 RX ORDER — SCOLOPAMINE TRANSDERMAL SYSTEM 1 MG/1
1 PATCH, EXTENDED RELEASE TRANSDERMAL
Qty: 10 PATCH | Refills: 11 | Status: SHIPPED | OUTPATIENT
Start: 2018-04-04 | End: 2019-03-01

## 2018-04-04 RX ORDER — LOSARTAN POTASSIUM 50 MG/1
50 TABLET ORAL DAILY
Qty: 90 TABLET | Refills: 3 | Status: SHIPPED | OUTPATIENT
Start: 2018-04-04 | End: 2019-03-01

## 2018-04-04 RX ORDER — SUMATRIPTAN 50 MG/1
50 TABLET, FILM COATED ORAL
Qty: 6 TABLET | Refills: 3 | Status: SHIPPED | OUTPATIENT
Start: 2018-04-04 | End: 2018-08-06

## 2018-04-04 RX ORDER — LORAZEPAM 0.5 MG/1
0.5 TABLET ORAL 2 TIMES DAILY PRN
Qty: 60 TABLET | Refills: 5 | Status: SHIPPED | OUTPATIENT
Start: 2018-04-04 | End: 2018-08-22

## 2018-04-04 ASSESSMENT — ENCOUNTER SYMPTOMS
BRUISES/BLEEDS EASILY: 0
FATIGUE: 0
PALPITATIONS: 0
WHEEZING: 0
LIGHT-HEADEDNESS: 0
VOMITING: 0
DIZZINESS: 0
DIARRHEA: 0
DYSURIA: 0
EYE PAIN: 0
NAUSEA: 0
CONFUSION: 0
COUGH: 0
HEMATURIA: 0
AGITATION: 0
ABDOMINAL PAIN: 0
FEVER: 0
ARTHRALGIAS: 0
CHILLS: 0
SHORTNESS OF BREATH: 0
MYALGIAS: 0

## 2018-04-04 ASSESSMENT — ANXIETY QUESTIONNAIRES
5. BEING SO RESTLESS THAT IT IS HARD TO SIT STILL: NOT AT ALL
GAD7 TOTAL SCORE: 0
1. FEELING NERVOUS, ANXIOUS, OR ON EDGE: NOT AT ALL
7. FEELING AFRAID AS IF SOMETHING AWFUL MIGHT HAPPEN: NOT AT ALL
2. NOT BEING ABLE TO STOP OR CONTROL WORRYING: NOT AT ALL
3. WORRYING TOO MUCH ABOUT DIFFERENT THINGS: NOT AT ALL
IF YOU CHECKED OFF ANY PROBLEMS ON THIS QUESTIONNAIRE, HOW DIFFICULT HAVE THESE PROBLEMS MADE IT FOR YOU TO DO YOUR WORK, TAKE CARE OF THINGS AT HOME, OR GET ALONG WITH OTHER PEOPLE: NOT DIFFICULT AT ALL
6. BECOMING EASILY ANNOYED OR IRRITABLE: NOT AT ALL

## 2018-04-04 ASSESSMENT — PATIENT HEALTH QUESTIONNAIRE - PHQ9: 5. POOR APPETITE OR OVEREATING: NOT AT ALL

## 2018-04-04 ASSESSMENT — PAIN SCALES - GENERAL: PAINLEVEL: NO PAIN (0)

## 2018-04-04 NOTE — NURSING NOTE
Patient presents to the clinic for medication refill, last administration of Adderall was this am around 0745.      Mariela Puentes LPN 4/4/2018 8:10 AM

## 2018-04-04 NOTE — PROGRESS NOTES
Nursing Notes:   Mariela Puentes LPN  4/4/2018  8:18 AM  Signed  Patient presents to the clinic for medication refill, last administration of Adderall was this am around 0745.      Mariela Puentes LPN 4/4/2018 8:10 AM      Nursing note reviewed with patient.  Accurracy and completeness verified.   Mr. Hess is a 37 year old male who:  Patient presents with:  Recheck Medication    HPI     ICD-10-CM    1. Benign essential hypertension I10 CBC with platelets and differential     Comprehensive metabolic panel (BMP + Alb, Alk Phos, ALT, AST, Total. Bili, TP)     amLODIPine (NORVASC) 5 MG tablet     losartan (COZAAR) 50 MG tablet   2. Elevated random blood glucose level R73.09 Hemoglobin A1c   3. FHx: coronary artery disease Z82.49 Lipid Profile (Chol, Trig, HDL, LDL calc) - FASTING   4. Attention deficit hyperactivity disorder (ADHD), combined type F90.2 LORazepam (ATIVAN) 0.5 MG tablet     amphetamine-dextroamphetamine (ADDERALL XR) 30 MG per 24 hr capsule     amphetamine-dextroamphetamine (ADDERALL XR) 30 MG per 24 hr capsule     amphetamine-dextroamphetamine (ADDERALL XR) 30 MG per 24 hr capsule   5. Migraine without aura and without status migrainosus, not intractable G43.009 SUMAtriptan (IMITREX) 50 MG tablet   6. Motion sickness, subsequent encounter T75.3XXD scopolamine (TRANSDERM) 72 hr patch   7. Inflammatory bowel disease -- not definitive of UC vs chrons based on blood work in 2006 K52.9 mesalamine (DELZICOL) 400 MG CR capsule   8. Major depression in complete remission (H) F32.5 amitriptyline (ELAVIL) 25 MG tablet     escitalopram (LEXAPRO) 10 MG tablet   9. Controlled substance agreement signed 1-3-18 Z79.899 amphetamine-dextroamphetamine (ADDERALL XR) 30 MG per 24 hr capsule     amphetamine-dextroamphetamine (ADDERALL XR) 30 MG per 24 hr capsule     amphetamine-dextroamphetamine (ADDERALL XR) 30 MG per 24 hr capsule   10. Heartburn R12 omeprazole (PRILOSEC) 20 MG CR capsule   11. Need for Tdap vaccination  Z23 TDAP VACCINE (BOOSTRIX)     Hypertension, currently well controlled.  Tolerating medication.  Needs refills.  Check labs.    Family history of heart disease.  Patient like his cholesterol checked.  States that his dad had his first heart issues in his 50s.  Parents both have blood sugar issues.    Attention deficit disorder, chronic, reports doing well.  Controlled with Adderall 30 mg daily.  He does try to have medication holidays once in a while in the weekend when he is not working.  Using lorazepam up to 2 times daily.    Migraine headache, doing much better since blood pressures have been controlled.  Rarely needing Imitrex.  States probably 90% less frequently now.    Motion sickness, scopolamine patches have been very helpful.  He wears them rather regularly.  He actually just took one half to this morning.    Inflammatory bowel disease, states back in 2006 [facility] was not able to completely differentiate whether this was ulcerative colitis versus Crohn's disease.  He has been using mesalamine 8 capsules daily and has been allowing him not to have to use any prednisone for a long time now.  He was having to use those fairly regularly but was not keeping such good track of his bowel habits and using mesalamine is regularly back then.  Needs refills today.    Depression is seen in remission.  He continues with amitriptyline at bedtime and Lexapro daily.    Heartburn, using omeprazole about every 2 or 3 days.  Really depends on his diet.    Today's vaccination is due, orders placed.    Functional Capacity: > 4 METS.   Reports that he can climb a flight of stairs without any chest pain/heaviness or shortness of breath.   No orthopnea/paroxysmal nocturnal dyspnea  Review of Systems   Constitutional: Negative for chills, fatigue and fever.   HENT: Negative for congestion and hearing loss.    Eyes: Negative for pain and visual disturbance.   Respiratory: Negative for cough, shortness of breath  and wheezing.    Cardiovascular: Negative for chest pain and palpitations.   Gastrointestinal: Negative for abdominal pain, diarrhea, nausea and vomiting.   Endocrine: Negative for cold intolerance and heat intolerance.   Genitourinary: Negative for dysuria and hematuria.   Musculoskeletal: Negative for arthralgias and myalgias.   Skin: Negative for pallor.   Allergic/Immunologic: Negative for immunocompromised state.   Neurological: Negative for dizziness and light-headedness.   Hematological: Does not bruise/bleed easily.   Psychiatric/Behavioral: Negative for agitation and confusion.        ANNMARIE:   ANNMARIE-7 SCORE 1/3/2018 3/13/2018 4/4/2018   Total Score 0 0 0     PHQ9:  PHQ-9 SCORE 5/8/2017 6/23/2017 1/3/2018   Total Score 0 0 0       I have personally reviewed the past medical history, past surgical history, medications, allergies, family and social history as listed below, on 4/4/2018.    Allergies   Allergen Reactions     Cats      Other reaction(s): Runny Nose  Itchy eyes and hand swelling     Food      Other reaction(s): Angioedema  Raw Parsnip AND Raw Carrots     Lisinopril Cough       Current Outpatient Prescriptions   Medication Sig Dispense Refill     amitriptyline (ELAVIL) 25 MG tablet Take 2 tablets (50 mg) by mouth At Bedtime 180 tablet 3     amLODIPine (NORVASC) 5 MG tablet Take 1 tablet (5 mg) by mouth daily 90 tablet 3     escitalopram (LEXAPRO) 10 MG tablet Take 1 tablet (10 mg) by mouth daily 90 tablet 3     LORazepam (ATIVAN) 0.5 MG tablet Take 1 tablet (0.5 mg) by mouth 2 times daily as needed 60 tablet 5     losartan (COZAAR) 50 MG tablet Take 1 tablet (50 mg) by mouth daily 90 tablet 3     mesalamine (DELZICOL) 400 MG CR capsule Take 2 capsules (800 mg) by mouth 4 times daily as needed 240 capsule 11     omeprazole (PRILOSEC) 20 MG CR capsule Take 1 capsule (20 mg) by mouth daily 30-60 minutes prior to 1st meal of the day - limit use as able 90 capsule 3     scopolamine (TRANSDERM) 72 hr patch  Place 1 patch onto the skin every 72 hours on dry, clean, hairless skin every 72 hours. For Motion Sickness 10 patch 11     SUMAtriptan (IMITREX) 50 MG tablet Take 1 tablet (50 mg) by mouth every 2 hours as needed for migraine Max Dose 4 tablets per 24 hrs. 6 tablet 3     amphetamine-dextroamphetamine (ADDERALL XR) 30 MG per 24 hr capsule Take 1 capsule (30 mg) by mouth daily 30 capsule 0     [START ON 5/5/2018] amphetamine-dextroamphetamine (ADDERALL XR) 30 MG per 24 hr capsule Take 1 capsule (30 mg) by mouth daily 30 capsule 0     [START ON 6/5/2018] amphetamine-dextroamphetamine (ADDERALL XR) 30 MG per 24 hr capsule Take 1 capsule (30 mg) by mouth daily 30 capsule 0     budesonide (RINOCORT AQUA) 32 MCG/ACT spray Spray 1 spray into both nostrils daily       methylPREDNISolone (MEDROL) 4 MG tablet TAKE AS DIRECTED FOR 10 DAYS FOR ULCERATIVE COLITIS FLAIR. REPEAT EVERY 4 WEEKS IF NEEDED. MAXIMUM OF 40 TABLETS MONTHLY       [DISCONTINUED] amitriptyline (ELAVIL) 25 MG tablet Take 2 tablets by mouth At Bedtime       [DISCONTINUED] amLODIPine (NORVASC) 5 MG tablet Take 1 tablet by mouth daily       [DISCONTINUED] escitalopram (LEXAPRO) 10 MG tablet Take 10 mg by mouth daily       [DISCONTINUED] losartan (COZAAR) 50 MG tablet Take 50 mg by mouth daily          Patient Active Problem List    Diagnosis Date Noted     Major depression in complete remission (H) 04/04/2018     Priority: Medium     Anxiety 12/01/2015     Priority: Medium     Attention deficit hyperactivity disorder (ADHD), combined type 12/01/2015     Priority: Medium     Controlled substance agreement signed 1-3-18 12/01/2015     Priority: Medium     Migraine without aura and without status migrainosus, not intractable 06/19/2015     Priority: Medium     Benign essential hypertension 08/13/2014     Priority: Medium     Motion sickness 08/13/2014     Priority: Medium     Sprain of left thumb 08/13/2014     Priority: Medium     Social phobia 12/19/2013      Priority: Medium     Allergic rhinitis due to cat hair 12/10/2013     Priority: Medium     Dust allergy 12/10/2013     Priority: Medium     Elevated liver enzymes 12/10/2013     Priority: Medium     GERD (gastroesophageal reflux disease) 12/10/2013     Priority: Medium     Hair loss 12/10/2013     Priority: Medium     Elevated random blood glucose level 12/10/2013     Priority: Medium     Lateral epicondylitis 12/10/2013     Priority: Medium     Medial epicondylitis of elbow 12/10/2013     Priority: Medium     Seasonal allergic rhinitis 12/10/2013     Priority: Medium     Sleeping difficulties 12/10/2013     Priority: Medium     Inflammatory bowel disease -- not definitive of UC vs chrons based on blood work in 2006 08/09/2007     Priority: Medium     Past Medical History:   Diagnosis Date     Allergic rhinitis due to animal hair and dander     12/10/2013     Gastro-esophageal reflux disease without esophagitis     12/10/2013     Major depressive disorder, single episode     12/10/2013,Previously followed with Psychiatry - was on Remeron, Adderall, Lexapro in the past. Awaiting to re-establish psychiatry care again.     Migraine without status migrainosus, not intractable     12/10/2013     Other allergic rhinitis     12/10/2013     Other seasonal allergic rhinitis     12/10/2013     Sleep disorder     12/10/2013     Ulcerative colitis without complications (H)     8/9/2007     Past Surgical History:   Procedure Laterality Date     APPENDECTOMY OPEN      2/4/13,Dr. 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     Recently was doing landscaping and carpentry work     - minimal work as of February 2014, for past 1 month.    Moved back to [facility] in about November 2012.     For family mental health issues, he notes that an aunt committed suicide in    October 2013 and 3 maternal aunts and his mother are on antidepressants. His    maternal grandmother was diagnosed with schizoaffective disorder, as was a    male cousin. Attention deficit hyperactivity disorder was significant for an    uncle and the client's cousins.     Family History   Problem Relation Age of Onset     Colon Cancer Paternal Grandmother      Cancer-colon     Colon Cancer Maternal Grandmother      Cancer-colon     Other - See Comments Other      Psychiatric illness,Aunt - suicide fall 2013       EXAM:   Vitals:    04/04/18 0811   BP: 128/70   BP Location: Right arm   Patient Position: Sitting   Cuff Size: Adult Regular   Pulse: 76   Weight: 205 lb (93 kg)       Current Pain Score: No Pain (0)     BP Readings from Last 3 Encounters:   04/04/18 128/70   03/13/18 126/68   03/10/18 136/88    Wt Readings from Last 3 Encounters:   04/04/18 205 lb (93 kg)   03/13/18 205 lb (93 kg)   03/10/18 215 lb 4.8 oz (97.7 kg)      Estimated body mass index is 29.41 kg/(m^2) as calculated from the following:    Height as of 3/10/18: 5' 10\" (1.778 m).    Weight as of this encounter: 205 lb (93 kg).     Physical Exam   Constitutional: He is oriented to person, place, and time. He appears well-developed and well-nourished. No distress.   HENT:   Head: Normocephalic and atraumatic.   Eyes: Conjunctivae are normal. No scleral icterus.   Neck: No thyromegaly present.   Cardiovascular: Normal rate and regular rhythm.    Pulmonary/Chest: Effort normal. No respiratory distress. He has no wheezes.   Abdominal: Soft. There is no tenderness.   Musculoskeletal: He exhibits " no tenderness or deformity.   Lymphadenopathy:     He has no cervical adenopathy.   Neurological: He is alert and oriented to person, place, and time. No cranial nerve deficit.   Skin: Skin is warm and dry.   Psychiatric: He has a normal mood and affect.       INVESTIGATIONS:  Results for orders placed or performed in visit on 03/10/18   Influenza A and B and RSV PCR   Result Value Ref Range    Specimen Description Nasopharyngeal     Influenza A PCR Negative NEG^Negative    Influenza B PCR Negative NEG^Negative    Resp Syncytial Virus Negative NEG^Negative   Strep, Rapid Screen   Result Value Ref Range    Specimen Description Throat     Rapid Strep A Screen       Negative presumptive for Group A Beta Streptococcus   Strep, Rapid Screen   Result Value Ref Range    Specimen Description Throat     Rapid Strep A Screen Canceled, Test credited        ASSESSMENT AND PLAN:  Problem List Items Addressed This Visit        Nervous and Auditory    Controlled substance agreement signed 1-3-18    Relevant Medications    amphetamine-dextroamphetamine (ADDERALL XR) 30 MG per 24 hr capsule    amphetamine-dextroamphetamine (ADDERALL XR) 30 MG per 24 hr capsule (Start on 5/5/2018)    amphetamine-dextroamphetamine (ADDERALL XR) 30 MG per 24 hr capsule (Start on 6/5/2018)    Migraine without aura and without status migrainosus, not intractable    Relevant Medications    amitriptyline (ELAVIL) 25 MG tablet    amLODIPine (NORVASC) 5 MG tablet    escitalopram (LEXAPRO) 10 MG tablet    SUMAtriptan (IMITREX) 50 MG tablet       Digestive    Motion sickness    Relevant Medications    scopolamine (TRANSDERM) 72 hr patch    Inflammatory bowel disease -- not definitive of UC vs chrons based on blood work in 2006    Relevant Medications    mesalamine (DELZICOL) 400 MG CR capsule    omeprazole (PRILOSEC) 20 MG CR capsule    scopolamine (TRANSDERM) 72 hr patch       Circulatory    Benign essential hypertension - Primary    Relevant Medications     amLODIPine (NORVASC) 5 MG tablet    losartan (COZAAR) 50 MG tablet    Other Relevant Orders    CBC with platelets and differential    Comprehensive metabolic panel (BMP + Alb, Alk Phos, ALT, AST, Total. Bili, TP)       Behavioral    Attention deficit hyperactivity disorder (ADHD), combined type    Relevant Medications    LORazepam (ATIVAN) 0.5 MG tablet    amphetamine-dextroamphetamine (ADDERALL XR) 30 MG per 24 hr capsule    amphetamine-dextroamphetamine (ADDERALL XR) 30 MG per 24 hr capsule (Start on 5/5/2018)    amphetamine-dextroamphetamine (ADDERALL XR) 30 MG per 24 hr capsule (Start on 6/5/2018)    Major depression in complete remission (H)    Relevant Medications    amitriptyline (ELAVIL) 25 MG tablet    escitalopram (LEXAPRO) 10 MG tablet    LORazepam (ATIVAN) 0.5 MG tablet       Other    Elevated random blood glucose level    Relevant Orders    Hemoglobin A1c      Other Visit Diagnoses     FHx: coronary artery disease        Relevant Orders    Lipid Profile (Chol, Trig, HDL, LDL calc) - FASTING    Heartburn        Relevant Medications    omeprazole (PRILOSEC) 20 MG CR capsule    Need for Tdap vaccination        Relevant Orders    TDAP VACCINE (BOOSTRIX) (Completed)        reviewed diet, exercise and weight control, recommended sodium restriction, cardiovascular risk and specific lipid/LDL goals reviewed  -- Expected clinical course discussed    -- Medications and their side effects discussed    Patient Instructions   Medications refilled. Glad everything is working.     Med list updated.     Labs today.     To help with weight loss and improve blood sugar control....    -- Try to avoid Carbohydrates as much as possible -- breads, pasta, baked goods, cakes, oatmeal, cold cereal, potatoes.   These are turned to sugar in one metabolic conversion, cause insulin secretion and increased fat deposition / weight gain.      -- Eat more lean meats, proteins, eggs, nuts.        There is no immunization history on file  for this patient.     Pneumococcal Pneumonia vaccines (PCV 13 and PCV 23)     Pneumococcal Conjugate 13 - Valent Vaccine (One time only).     [id number] - Valent Vaccine -- Two doses (One before age 65 and One After)    -- repeat every 5 years in certain patient populations.     PCV 13should be given prior to PCV 23 -- THEN -- In eight weeks or more, PCV 23 can be given.   If the patient has already received PCV 23, they should notreceive PCV 13 for one year.     Tetanus vaccination today. Tdap.    Return in approximately 3 month(s), or sooner as needed for follow-up with Dr. Salcido.  - Med Refills    Clinic : [phone]  Appointment line: [phone]      Jarad Salcido MD  Internal Medicine  [facility]

## 2018-04-04 NOTE — PATIENT INSTRUCTIONS
Medications refilled. Glad everything is working.     Med list updated.     Labs today.     To help with weight loss and improve blood sugar control....    -- Try to avoid Carbohydrates as much as possible -- breads, pasta, baked goods, cakes, oatmeal, cold cereal, potatoes.   These are turned to sugar in one metabolic conversion, cause insulin secretion and increased fat deposition / weight gain.      -- Eat more lean meats, proteins, eggs, nuts.        There is no immunization history on file for this patient.     Pneumococcal Pneumonia vaccines (PCV 13 and PCV 23)     Pneumococcal Conjugate 13 - Valent Vaccine (One time only).     [id number] - Valent Vaccine -- Two doses (One before age 65 and One After)    -- repeat every 5 years in certain patient populations.     PCV 13should be given prior to PCV 23 -- THEN -- In eight weeks or more, PCV 23 can be given.   If the patient has already received PCV 23, they should notreceive PCV 13 for one year.     Tetanus vaccination today. Tdap.    Return in approximately 3 month(s), or sooner as needed for follow-up with Dr. Salcido.  - Med Refills    Clinic : [phone]  Appointment line: [phone]

## 2018-04-04 NOTE — LETTER
Kike Hess  [address] [place] [address]    4/4/2018      Dear Kike Hess,    The result of your recent tests are included below:    Liver enzymes are mildly elevated.    Blood counts are normal.    Hemoglobin A1c/diabetes lab is normal.    Cholesterol levels are very high.  We should talk about medication options to treat this.    Results for orders placed or performed in visit on 04/04/18   CBC with platelets and differential   Result Value Ref Range    WBC 6.8 4.0 - 11.0 10e9/L    RBC Count 5.33 4.4 - 5.9 10e12/L    Hemoglobin 17.1 13.3 - 17.7 g/dL    Hematocrit 49.8 40.0 - 53.0 %    MCV 93 78 - 100 fl    MCH 32.1 26.5 - 33.0 pg    MCHC 34.3 31.5 - 36.5 g/dL    RDW 12.8 10.0 - 15.0 %    Platelet Count 228 150 - 450 10e9/L    Diff Method Automated Method     % Neutrophils 47.7 %    % Lymphocytes 40.7 %    % Monocytes 8.9 %    % Eosinophils 1.5 %    % Basophils 0.6 %    % Immature Granulocytes 0.6 %    Absolute Neutrophil 3.3 1.6 - 8.3 10e9/L    Absolute Lymphocytes 2.8 0.8 - 5.3 10e9/L    Absolute Monocytes 0.6 0.0 - 1.3 10e9/L    Absolute Eosinophils 0.1 0.0 - 0.7 10e9/L    Absolute Basophils 0.0 0.0 - 0.2 10e9/L    Abs Immature Granulocytes 0.0 0 - 0.4 10e9/L   Comprehensive metabolic panel (BMP + Alb, Alk Phos, ALT, AST, Total. Bili, TP)   Result Value Ref Range    Sodium 142 134 - 144 mmol/L    Potassium 4.4 3.5 - 5.1 mmol/L    Chloride 103 98 - 107 mmol/L    Carbon Dioxide 26 21 - 31 mmol/L    Anion Gap 13 3 - 14 mmol/L    Glucose 112 (H) 70 - 105 mg/dL    Urea Nitrogen 10 7 - 25 mg/dL    Creatinine 0.97 0.70 - 1.30 mg/dL    GFR Estimate 87 >60 mL/min/1.7m2    GFR Estimate If Black >90 >60 mL/min/1.7m2    Calcium 10.4 (H) 8.6 - 10.3 mg/dL    Bilirubin Total 0.3 0.3 - 1.0 mg/dL    Albumin 4.9 3.5 - 5.7 g/dL    Protein Total 7.6 6.4 - 8.9 g/dL    Alkaline Phosphatase 75 34 - 104 U/L    ALT 81 (H) 7 - 52 U/L    AST 60 (H) 13 - 39 U/L   Hemoglobin A1c   Result Value Ref Range     Hemoglobin A1C 5.5 4.0 - 6.0 %   Lipid Profile (Chol, Trig, HDL, LDL calc) - FASTING   Result Value Ref Range    Cholesterol 346 (H) <200 mg/dL    Triglycerides 692 (H) <150 mg/dL    HDL Cholesterol 50 23 - 92 mg/dL    LDL Cholesterol Calculated  <100 mg/dL     Cannot estimate LDL when triglyceride exceeds 400 mg/dL    Non HDL Cholesterol 296 (H) <130 mg/dL       If you have any further questions or problems, please contact my office at [phone] and schedule an appointment.    Clinic : [phone]  Appointment line: [phone]     Thank you,    Jarad Salcido MD    Internal Medicine  [facility]     Reviewed and electronically signed by provider.

## 2018-04-05 ASSESSMENT — ANXIETY QUESTIONNAIRES: GAD7 TOTAL SCORE: 0

## 2018-04-18 ENCOUNTER — OFFICE VISIT (OUTPATIENT)
Dept: INTERNAL MEDICINE | Facility: OTHER | Age: 38
End: 2018-04-18
Attending: INTERNAL MEDICINE
Payer: COMMERCIAL

## 2018-04-18 VITALS
HEART RATE: 96 BPM | WEIGHT: 200 LBS | DIASTOLIC BLOOD PRESSURE: 74 MMHG | SYSTOLIC BLOOD PRESSURE: 118 MMHG | BODY MASS INDEX: 28.7 KG/M2

## 2018-04-18 DIAGNOSIS — E78.01 FAMILIAL HYPERCHOLESTEROLEMIA: Primary | ICD-10-CM

## 2018-04-18 DIAGNOSIS — E78.01 ESSENTIAL FAMILIAL HYPERCHOLESTEROLEMIA: Primary | ICD-10-CM

## 2018-04-18 PROCEDURE — 99214 OFFICE O/P EST MOD 30 MIN: CPT | Performed by: INTERNAL MEDICINE

## 2018-04-18 PROCEDURE — G0463 HOSPITAL OUTPT CLINIC VISIT: HCPCS

## 2018-04-18 RX ORDER — ATORVASTATIN CALCIUM 80 MG/1
80 TABLET, FILM COATED ORAL DAILY
Qty: 90 TABLET | Refills: 3 | Status: SHIPPED | OUTPATIENT
Start: 2018-04-18 | End: 2019-03-01

## 2018-04-18 RX ORDER — ROSUVASTATIN CALCIUM 40 MG/1
40 TABLET, COATED ORAL DAILY
Qty: 90 TABLET | Refills: 3 | Status: SHIPPED | OUTPATIENT
Start: 2018-04-18 | End: 2018-04-18

## 2018-04-18 ASSESSMENT — ENCOUNTER SYMPTOMS
BRUISES/BLEEDS EASILY: 0
FATIGUE: 0
FEVER: 0
SHORTNESS OF BREATH: 0
DIARRHEA: 0
CONFUSION: 0
DYSURIA: 0
COUGH: 0
DIZZINESS: 0
PALPITATIONS: 0
NAUSEA: 0
HEMATURIA: 0
EYE PAIN: 0
LIGHT-HEADEDNESS: 0
CHILLS: 0
AGITATION: 0
WHEEZING: 0
ARTHRALGIAS: 0
ABDOMINAL PAIN: 0
VOMITING: 0
MYALGIAS: 0

## 2018-04-18 ASSESSMENT — ANXIETY QUESTIONNAIRES
1. FEELING NERVOUS, ANXIOUS, OR ON EDGE: NOT AT ALL
6. BECOMING EASILY ANNOYED OR IRRITABLE: NOT AT ALL
GAD7 TOTAL SCORE: 0
2. NOT BEING ABLE TO STOP OR CONTROL WORRYING: NOT AT ALL
IF YOU CHECKED OFF ANY PROBLEMS ON THIS QUESTIONNAIRE, HOW DIFFICULT HAVE THESE PROBLEMS MADE IT FOR YOU TO DO YOUR WORK, TAKE CARE OF THINGS AT HOME, OR GET ALONG WITH OTHER PEOPLE: NOT DIFFICULT AT ALL
7. FEELING AFRAID AS IF SOMETHING AWFUL MIGHT HAPPEN: NOT AT ALL
5. BEING SO RESTLESS THAT IT IS HARD TO SIT STILL: NOT AT ALL
3. WORRYING TOO MUCH ABOUT DIFFERENT THINGS: NOT AT ALL

## 2018-04-18 ASSESSMENT — PATIENT HEALTH QUESTIONNAIRE - PHQ9: 5. POOR APPETITE OR OVEREATING: NOT AT ALL

## 2018-04-18 ASSESSMENT — PAIN SCALES - GENERAL: PAINLEVEL: NO PAIN (0)

## 2018-04-18 NOTE — PROGRESS NOTES
Nursing Notes:   Mariela Puentes LPN  4/18/2018  9:16 AM  Signed  Patient presents to the clinic for follow up with Lipid Panel.      Mariela Puentes LPN 4/18/2018 9:05 AM      Nursing note reviewed with patient.  Accurracy and completeness verified.   Mr. Hess is a 37 year old male who:  Patient presents with:  Recheck Medication    HPI     ICD-10-CM    1. Familial hypercholesterolemia - at least heterozygote E78.01 rosuvastatin (CRESTOR) 40 MG tablet     Patient presents for follow-up of labs.  States that all of his family members have high cholesterol.  He came in fasting for his labs, noted below, reports he had not eaten for at least 8 hours.  Cholesterol levels were very high.  LDL was not able to be measured due to very high triglyceride levels.    We talked about a number of different treatment options.  He would like to initiate Crestor.  Advised 10 mg daily for about a week and then 20 mg daily for about a week and slowly increase up to 40 mg daily.  We will recheck labs in 2 and half to 3 months when he comes in for follow-up of his other issues.    We did talk about fish oil, the possibility of niacin or the lack of evidence of niacin, as well as coenzyme Q 10 supplementation.  Recommend Crestor for now check labs and if needed we can proceed with addition of fish oil.    Risks and benefits of treatment discussed.  He would like to proceed.  We did talk about injectable cluster medication options for the verified genetic familial hyperlipidemia patient's, at this time he wants to see how this works and if needed we can have him see the lipid clinic.    Functional Capacity: > 4 METS.   Reports that he can climb a flight of stairs without any chest pain/heaviness or shortness of breath.   No orthopnea/paroxysmal nocturnal dyspnea  Review of Systems   Constitutional: Negative for chills, fatigue and fever.   HENT: Negative for congestion and hearing loss.    Eyes: Negative for pain and visual  disturbance.   Respiratory: Negative for cough, shortness of breath and wheezing.    Cardiovascular: Negative for chest pain and palpitations.   Gastrointestinal: Negative for abdominal pain, diarrhea, nausea and vomiting.   Endocrine: Negative for cold intolerance and heat intolerance.   Genitourinary: Negative for dysuria and hematuria.   Musculoskeletal: Negative for arthralgias and myalgias.   Skin: Negative for pallor.   Allergic/Immunologic: Negative for immunocompromised state.   Neurological: Negative for dizziness and light-headedness.   Hematological: Does not bruise/bleed easily.   Psychiatric/Behavioral: Negative for agitation and confusion.        ANNMARIE:   ANNMARIE-7 SCORE 3/13/2018 4/4/2018 4/18/2018   Total Score 0 0 0     PHQ9:  PHQ-9 SCORE 6/23/2017 1/3/2018 4/18/2018   Total Score 0 0 0       I have personally reviewed the past medical history, past surgical history, medications, allergies, family and social history as listed below, on 4/18/2018.    Allergies   Allergen Reactions     Cats      Other reaction(s): Runny Nose  Itchy eyes and hand swelling     Food      Other reaction(s): Angioedema  Raw Parsnip AND Raw Carrots     Lisinopril Cough       Current Outpatient Prescriptions   Medication Sig Dispense Refill     amitriptyline (ELAVIL) 25 MG tablet Take 2 tablets (50 mg) by mouth At Bedtime 180 tablet 3     amLODIPine (NORVASC) 5 MG tablet Take 1 tablet (5 mg) by mouth daily 90 tablet 3     amphetamine-dextroamphetamine (ADDERALL XR) 30 MG per 24 hr capsule Take 1 capsule (30 mg) by mouth daily 30 capsule 0     [START ON 5/5/2018] amphetamine-dextroamphetamine (ADDERALL XR) 30 MG per 24 hr capsule Take 1 capsule (30 mg) by mouth daily 30 capsule 0     [START ON 6/5/2018] amphetamine-dextroamphetamine (ADDERALL XR) 30 MG per 24 hr capsule Take 1 capsule (30 mg) by mouth daily 30 capsule 0     budesonide (RINOCORT AQUA) 32 MCG/ACT spray Spray 1 spray into both nostrils daily       escitalopram  (LEXAPRO) 10 MG tablet Take 1 tablet (10 mg) by mouth daily 90 tablet 3     LORazepam (ATIVAN) 0.5 MG tablet Take 1 tablet (0.5 mg) by mouth 2 times daily as needed 60 tablet 5     losartan (COZAAR) 50 MG tablet Take 1 tablet (50 mg) by mouth daily 90 tablet 3     mesalamine (DELZICOL) 400 MG CR capsule Take 2 capsules (800 mg) by mouth 4 times daily as needed 240 capsule 11     methylPREDNISolone (MEDROL) 4 MG tablet TAKE AS DIRECTED FOR 10 DAYS FOR ULCERATIVE COLITIS FLAIR. REPEAT EVERY 4 WEEKS IF NEEDED. MAXIMUM OF 40 TABLETS MONTHLY       omeprazole (PRILOSEC) 20 MG CR capsule Take 1 capsule (20 mg) by mouth daily 30-60 minutes prior to 1st meal of the day - limit use as able 90 capsule 3     rosuvastatin (CRESTOR) 40 MG tablet Take 1 tablet (40 mg) by mouth daily 90 tablet 3     scopolamine (TRANSDERM) 72 hr patch Place 1 patch onto the skin every 72 hours on dry, clean, hairless skin every 72 hours. For Motion Sickness 10 patch 11     SUMAtriptan (IMITREX) 50 MG tablet Take 1 tablet (50 mg) by mouth every 2 hours as needed for migraine Max Dose 4 tablets per 24 hrs. 6 tablet 3        Patient Active Problem List    Diagnosis Date Noted     Familial hypercholesterolemia - at least heterozygote 04/18/2018     Priority: Medium     Major depression in complete remission (H) 04/04/2018     Priority: Medium     Anxiety 12/01/2015     Priority: Medium     Attention deficit hyperactivity disorder (ADHD), combined type 12/01/2015     Priority: Medium     Controlled substance agreement signed 1-3-18 12/01/2015     Priority: Medium     Migraine without aura and without status migrainosus, not intractable 06/19/2015     Priority: Medium     Benign essential hypertension 08/13/2014     Priority: Medium     Motion sickness 08/13/2014     Priority: Medium     Sprain of left thumb 08/13/2014     Priority: Medium     Social phobia 12/19/2013     Priority: Medium     Allergic rhinitis due to cat hair 12/10/2013     Priority:  Medium     Dust allergy 12/10/2013     Priority: Medium     Elevated liver enzymes 12/10/2013     Priority: Medium     GERD (gastroesophageal reflux disease) 12/10/2013     Priority: Medium     Hair loss 12/10/2013     Priority: Medium     Elevated random blood glucose level 12/10/2013     Priority: Medium     Lateral epicondylitis 12/10/2013     Priority: Medium     Medial epicondylitis of elbow 12/10/2013     Priority: Medium     Seasonal allergic rhinitis 12/10/2013     Priority: Medium     Sleeping difficulties 12/10/2013     Priority: Medium     Inflammatory bowel disease -- not definitive of UC vs chrons based on blood work in 2006 08/09/2007     Priority: Medium     Past Medical History:   Diagnosis Date     Allergic rhinitis due to animal hair and dander     12/10/2013     Gastro-esophageal reflux disease without esophagitis     12/10/2013     Major depressive disorder, single episode     12/10/2013,Previously followed with Psychiatry - was on Remeron, Adderall, Lexapro in the past. Awaiting to re-establish psychiatry care again.     Migraine without status migrainosus, not intractable     12/10/2013     Other allergic rhinitis     12/10/2013     Other seasonal allergic rhinitis     12/10/2013     Sleep disorder     12/10/2013     Ulcerative colitis without complications (H)     8/9/2007     Past Surgical History:   Procedure Laterality Date     APPENDECTOMY OPEN      2/4/13,Dr. 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     Recently was doing landscaping and carpentry work     - minimal work as of February 2014, for past 1 month.    Moved back to [facility] in about November 2012.     For family mental health issues, he notes that an aunt committed suicide in    October 2013 and 3 maternal aunts and his mother are on antidepressants. His    maternal grandmother was diagnosed with schizoaffective disorder, as was a    male cousin. Attention deficit hyperactivity disorder was significant for an    uncle and the client's cousins.     Family History   Problem Relation Age of Onset     Colon Cancer Paternal Grandmother      Cancer-colon     Colon Cancer Maternal Grandmother      Cancer-colon     Other - See Comments Other      Psychiatric illness,Aunt - suicide fall 2013       EXAM:   Vitals:    04/18/18 0906   BP: 118/74   BP Location: Right arm   Patient Position: Sitting   Cuff Size: Adult Regular   Pulse: 96   Weight: 200 lb (90.7 kg)       Current Pain Score: No Pain (0)     BP Readings from Last 3 Encounters:   04/18/18 118/74   04/04/18 128/70   03/13/18 126/68    Wt Readings from Last 3 Encounters:   04/18/18 200 lb (90.7 kg)   04/04/18 205 lb (93 kg)   03/13/18 205 lb (93 kg)      Estimated body mass index is 28.7 kg/(m^2) as calculated from the following:    Height as of 3/10/18: 5' 10\" (1.778 m).    Weight as of this encounter: 200 lb (90.7 kg).     Physical Exam   Constitutional: He appears well-developed and well-nourished.   HENT:   Head: Normocephalic and atraumatic.   Eyes: Conjunctivae are normal. No scleral icterus.   Cardiovascular: Normal rate.    Pulmonary/Chest: Effort normal.   Abdominal: Soft.   Musculoskeletal: Normal range of motion. He exhibits no edema.   Neurological: He is alert.   Skin: Skin is warm and dry.   Psychiatric: He has a normal mood and affect.       INVESTIGATIONS:  Results for orders placed or performed in visit on 04/04/18   CBC with platelets and " differential   Result Value Ref Range    WBC 6.8 4.0 - 11.0 10e9/L    RBC Count 5.33 4.4 - 5.9 10e12/L    Hemoglobin 17.1 13.3 - 17.7 g/dL    Hematocrit 49.8 40.0 - 53.0 %    MCV 93 78 - 100 fl    MCH 32.1 26.5 - 33.0 pg    MCHC 34.3 31.5 - 36.5 g/dL    RDW 12.8 10.0 - 15.0 %    Platelet Count 228 150 - 450 10e9/L    Diff Method Automated Method     % Neutrophils 47.7 %    % Lymphocytes 40.7 %    % Monocytes 8.9 %    % Eosinophils 1.5 %    % Basophils 0.6 %    % Immature Granulocytes 0.6 %    Absolute Neutrophil 3.3 1.6 - 8.3 10e9/L    Absolute Lymphocytes 2.8 0.8 - 5.3 10e9/L    Absolute Monocytes 0.6 0.0 - 1.3 10e9/L    Absolute Eosinophils 0.1 0.0 - 0.7 10e9/L    Absolute Basophils 0.0 0.0 - 0.2 10e9/L    Abs Immature Granulocytes 0.0 0 - 0.4 10e9/L   Comprehensive metabolic panel (BMP + Alb, Alk Phos, ALT, AST, Total. Bili, TP)   Result Value Ref Range    Sodium 142 134 - 144 mmol/L    Potassium 4.4 3.5 - 5.1 mmol/L    Chloride 103 98 - 107 mmol/L    Carbon Dioxide 26 21 - 31 mmol/L    Anion Gap 13 3 - 14 mmol/L    Glucose 112 (H) 70 - 105 mg/dL    Urea Nitrogen 10 7 - 25 mg/dL    Creatinine 0.97 0.70 - 1.30 mg/dL    GFR Estimate 87 >60 mL/min/1.7m2    GFR Estimate If Black >90 >60 mL/min/1.7m2    Calcium 10.4 (H) 8.6 - 10.3 mg/dL    Bilirubin Total 0.3 0.3 - 1.0 mg/dL    Albumin 4.9 3.5 - 5.7 g/dL    Protein Total 7.6 6.4 - 8.9 g/dL    Alkaline Phosphatase 75 34 - 104 U/L    ALT 81 (H) 7 - 52 U/L    AST 60 (H) 13 - 39 U/L   Hemoglobin A1c   Result Value Ref Range    Hemoglobin A1C 5.5 4.0 - 6.0 %   Lipid Profile (Chol, Trig, HDL, LDL calc) - FASTING   Result Value Ref Range    Cholesterol 346 (H) <200 mg/dL    Triglycerides 692 (H) <150 mg/dL    HDL Cholesterol 50 23 - 92 mg/dL    LDL Cholesterol Calculated  <100 mg/dL     Cannot estimate LDL when triglyceride exceeds 400 mg/dL    Non HDL Cholesterol 296 (H) <130 mg/dL       ASSESSMENT AND PLAN:  Problem List Items Addressed This Visit        Endocrine     Familial hypercholesterolemia - at least heterozygote - Primary    Relevant Medications    rosuvastatin (CRESTOR) 40 MG tablet        reviewed diet, exercise and weight control, cardiovascular risk and specific lipid/LDL goals reviewed  -- Expected clinical course discussed    -- Medications and their side effects discussed    Patient Instructions   - Coenzyme Q10 (CO Q-10) 200 mg capsule (400 to 500 mg daily) --all cause cardiovascular mortality was lower, in people taking this medication while on cholesterol medication.     Could consider Omega-3 -- up to 4000 mg daily --  Can lower triglycerides by about 10% for every 1000 mg.       1. Familial hypercholesterolemia - at least heterozygote  START:   - rosuvastatin (CRESTOR) 40 MG tablet; Take 1 tablet (40 mg) by mouth daily  Dispense: 90 tablet; Refill: 3    Return in approximately 2.5-3 month(s), or sooner as needed for follow-up with Dr. Salcido.  - labs at that visit.     Clinic : [phone]  Appointment line: [phone]      Jarad Salcido MD  Internal Medicine  [facility]

## 2018-04-18 NOTE — TELEPHONE ENCOUNTER
Insurance will cover atorvastatin- patient states this was discussed. Will also have him use taper as noted in chart

## 2018-04-18 NOTE — PATIENT INSTRUCTIONS
- Coenzyme Q10 (CO Q-10) 200 mg capsule (400 to 500 mg daily) --all cause cardiovascular mortality was lower, in people taking this medication while on cholesterol medication.     Could consider Omega-3 -- up to 4000 mg daily --  Can lower triglycerides by about 10% for every 1000 mg.       1. Familial hypercholesterolemia - at least heterozygote  START:   - rosuvastatin (CRESTOR) 40 MG tablet; Take 1 tablet (40 mg) by mouth daily  Dispense: 90 tablet; Refill: 3      Results for orders placed or performed in visit on 04/04/18   CBC with platelets and differential   Result Value Ref Range    WBC 6.8 4.0 - 11.0 10e9/L    RBC Count 5.33 4.4 - 5.9 10e12/L    Hemoglobin 17.1 13.3 - 17.7 g/dL    Hematocrit 49.8 40.0 - 53.0 %    MCV 93 78 - 100 fl    MCH 32.1 26.5 - 33.0 pg    MCHC 34.3 31.5 - 36.5 g/dL    RDW 12.8 10.0 - 15.0 %    Platelet Count 228 150 - 450 10e9/L    Diff Method Automated Method     % Neutrophils 47.7 %    % Lymphocytes 40.7 %    % Monocytes 8.9 %    % Eosinophils 1.5 %    % Basophils 0.6 %    % Immature Granulocytes 0.6 %    Absolute Neutrophil 3.3 1.6 - 8.3 10e9/L    Absolute Lymphocytes 2.8 0.8 - 5.3 10e9/L    Absolute Monocytes 0.6 0.0 - 1.3 10e9/L    Absolute Eosinophils 0.1 0.0 - 0.7 10e9/L    Absolute Basophils 0.0 0.0 - 0.2 10e9/L    Abs Immature Granulocytes 0.0 0 - 0.4 10e9/L   Comprehensive metabolic panel (BMP + Alb, Alk Phos, ALT, AST, Total. Bili, TP)   Result Value Ref Range    Sodium 142 134 - 144 mmol/L    Potassium 4.4 3.5 - 5.1 mmol/L    Chloride 103 98 - 107 mmol/L    Carbon Dioxide 26 21 - 31 mmol/L    Anion Gap 13 3 - 14 mmol/L    Glucose 112 (H) 70 - 105 mg/dL    Urea Nitrogen 10 7 - 25 mg/dL    Creatinine 0.97 0.70 - 1.30 mg/dL    GFR Estimate 87 >60 mL/min/1.7m2    GFR Estimate If Black >90 >60 mL/min/1.7m2    Calcium 10.4 (H) 8.6 - 10.3 mg/dL    Bilirubin Total 0.3 0.3 - 1.0 mg/dL    Albumin 4.9 3.5 - 5.7 g/dL    Protein Total 7.6 6.4 - 8.9 g/dL    Alkaline Phosphatase 75 34 -  104 U/L    ALT 81 (H) 7 - 52 U/L    AST 60 (H) 13 - 39 U/L   Hemoglobin A1c   Result Value Ref Range    Hemoglobin A1C 5.5 4.0 - 6.0 %   Lipid Profile (Chol, Trig, HDL, LDL calc) - FASTING   Result Value Ref Range    Cholesterol 346 (H) <200 mg/dL    Triglycerides 692 (H) <150 mg/dL    HDL Cholesterol 50 23 - 92 mg/dL    LDL Cholesterol Calculated  <100 mg/dL     Cannot estimate LDL when triglyceride exceeds 400 mg/dL    Non HDL Cholesterol 296 (H) <130 mg/dL          Return in approximately 2.5-3 month(s), or sooner as needed for follow-up with Dr. Salcido.  - labs at that visit.     Clinic : [phone]  Appointment line: [phone]

## 2018-04-18 NOTE — MR AVS SNAPSHOT
After Visit Summary   4/18/2018    Kike Hess    MRN: [id number]           Patient Information     Date Of Birth          1980        Visit Information        Provider Department      4/18/2018 9:00 AM Jarad Salcido MD [facility]        Today's Diagnoses     Familial hypercholesterolemia - at least heterozygote    -  1      Care Instructions    - Coenzyme Q10 (CO Q-10) 200 mg capsule (400 to 500 mg daily) --all cause cardiovascular mortality was lower, in people taking this medication while on cholesterol medication.     Could consider Omega-3 -- up to 4000 mg daily --  Can lower triglycerides by about 10% for every 1000 mg.       1. Familial hypercholesterolemia - at least heterozygote  START:   - rosuvastatin (CRESTOR) 40 MG tablet; Take 1 tablet (40 mg) by mouth daily  Dispense: 90 tablet; Refill: 3      Results for orders placed or performed in visit on 04/04/18   CBC with platelets and differential   Result Value Ref Range    WBC 6.8 4.0 - 11.0 10e9/L    RBC Count 5.33 4.4 - 5.9 10e12/L    Hemoglobin 17.1 13.3 - 17.7 g/dL    Hematocrit 49.8 40.0 - 53.0 %    MCV 93 78 - 100 fl    MCH 32.1 26.5 - 33.0 pg    MCHC 34.3 31.5 - 36.5 g/dL    RDW 12.8 10.0 - 15.0 %    Platelet Count 228 150 - 450 10e9/L    Diff Method Automated Method     % Neutrophils 47.7 %    % Lymphocytes 40.7 %    % Monocytes 8.9 %    % Eosinophils 1.5 %    % Basophils 0.6 %    % Immature Granulocytes 0.6 %    Absolute Neutrophil 3.3 1.6 - 8.3 10e9/L    Absolute Lymphocytes 2.8 0.8 - 5.3 10e9/L    Absolute Monocytes 0.6 0.0 - 1.3 10e9/L    Absolute Eosinophils 0.1 0.0 - 0.7 10e9/L    Absolute Basophils 0.0 0.0 - 0.2 10e9/L    Abs Immature Granulocytes 0.0 0 - 0.4 10e9/L   Comprehensive metabolic panel (BMP + Alb, Alk Phos, ALT, AST, Total. Bili, TP)   Result Value Ref Range    Sodium 142 134 - 144 mmol/L    Potassium 4.4 3.5 - 5.1 mmol/L    Chloride 103 98 - 107 mmol/L    Carbon Dioxide 26 21 -  31 mmol/L    Anion Gap 13 3 - 14 mmol/L    Glucose 112 (H) 70 - 105 mg/dL    Urea Nitrogen 10 7 - 25 mg/dL    Creatinine 0.97 0.70 - 1.30 mg/dL    GFR Estimate 87 >60 mL/min/1.7m2    GFR Estimate If Black >90 >60 mL/min/1.7m2    Calcium 10.4 (H) 8.6 - 10.3 mg/dL    Bilirubin Total 0.3 0.3 - 1.0 mg/dL    Albumin 4.9 3.5 - 5.7 g/dL    Protein Total 7.6 6.4 - 8.9 g/dL    Alkaline Phosphatase 75 34 - 104 U/L    ALT 81 (H) 7 - 52 U/L    AST 60 (H) 13 - 39 U/L   Hemoglobin A1c   Result Value Ref Range    Hemoglobin A1C 5.5 4.0 - 6.0 %   Lipid Profile (Chol, Trig, HDL, LDL calc) - FASTING   Result Value Ref Range    Cholesterol 346 (H) <200 mg/dL    Triglycerides 692 (H) <150 mg/dL    HDL Cholesterol 50 23 - 92 mg/dL    LDL Cholesterol Calculated  <100 mg/dL     Cannot estimate LDL when triglyceride exceeds 400 mg/dL    Non HDL Cholesterol 296 (H) <130 mg/dL          Return in approximately 2.5-3 month(s), or sooner as needed for follow-up with Dr. Salcido.  - labs at that visit.     Clinic : [phone]  Appointment line: [phone]            Follow-ups after your visit        Follow-up notes from your care team     Return in about 3 months (around 7/18/2018).      Who to contact     If you have questions or need follow up information about today's clinic visit or your schedule please contact [facility] directly at [phone].  Normal or non-critical lab and imaging results will be communicated to you by MyChart, letter or phone within 4 business days after the clinic has received the results. If you do not hear from us within 7 days, please contact the clinic through [employer] or phone. If you have a critical or abnormal lab result, we will notify you by phone as soon as possible.  Submit refill requests through [employer] or call your pharmacy and they will forward the refill request to us. Please allow 3 business days for your refill to be completed.          Additional Information About  "Your Visit        The [employer] Information     [employer] lets you send messages to your doctor, view your test results, renew your prescriptions, schedule appointments and more. To sign up, go to [email or web address] . Click on \"Log in\" on the left side of the screen, which will take you to the Welcome page. Then click on \"Sign up Now\" on the right side of the page.     You will be asked to enter the access code listed below, as well as some personal information. Please follow the directions to create your username and password.     Your access code is: [email or web address]  Expires: 2018  3:28 PM     Your access code will  in 90 days. If you need help or a new code, please call your [place] clinic or [phone].        Care EveryWhere ID     This is your Care EveryWhere ID. This could be used by other organizations to access your [place] medical records  [id number]        Your Vitals Were     Pulse BMI (Body Mass Index)                96 28.7 kg/m2           Blood Pressure from Last 3 Encounters:   18 118/74   18 128/70   18 126/68    Weight from Last 3 Encounters:   18 200 lb (90.7 kg)   18 205 lb (93 kg)   18 205 lb (93 kg)              Today, you had the following     No orders found for display         Today's Medication Changes          These changes are accurate as of 18  9:24 AM.  If you have any questions, ask your nurse or doctor.               Start taking these medicines.        Dose/Directions    rosuvastatin 40 MG tablet   Commonly known as:  CRESTOR   Used for:  Familial hypercholesterolemia   Started by:  Jarad Salcido MD        Dose:  40 mg   Take 1 tablet (40 mg) by mouth daily   Quantity:  90 tablet   Refills:  3            Where to get your medicines      These medications were sent to [facility], - [place] - [address]  [address], [place] [address]     Phone:  [phone]     rosuvastatin 40 " MG tablet                Primary Care Provider Office Phone # Fax #    Jarad Salcido [phone] [address]   [place] [address]        Equal Access to Services     BLANCHEDAMARIS THIERRY : Hadii leodan wright seda De La Paz, wavidhyada luqadaha, qaybta kaalmada kim, pawel ynesin hayaajuventino monteschau adishadydelores tesfaye. So [facility] [phone].    ATENCIÓN: Si habla español, tiene a raymundo disposición servicios gratuitos de asistencia lingüística. Llame al [phone].    We comply with applicable federal civil rights laws and [place] laws. We do not discriminate on the basis of race, color, national origin, age, disability, sex, sexual orientation, or gender identity.            Thank you!     Thank you for choosing [facility] AND [facility]  for your care. Our goal is always to provide you with excellent care. Hearing back from our patients is one way we can continue to improve our services. Please take a few minutes to complete the written survey that you may receive in the mail after your visit with us. Thank you!             Your Updated Medication List - Protect others around you: Learn how to safely use, store and throw away your medicines at www.disposemymeds.org.          This list is accurate as of 4/18/18  9:24 AM.  Always use your most recent med list.                   Brand Name Dispense Instructions for use Diagnosis    amitriptyline 25 MG tablet    ELAVIL    180 tablet    Take 2 tablets (50 mg) by mouth At Bedtime    Major depression in complete remission (H)       amLODIPine 5 MG tablet    NORVASC    90 tablet    Take 1 tablet (5 mg) by mouth daily    Benign essential hypertension       * amphetamine-dextroamphetamine 30 MG per 24 hr capsule    ADDERALL XR    30 capsule    Take 1 capsule (30 mg) by mouth daily    Attention deficit hyperactivity disorder (ADHD), combined type, Controlled substance agreement signed       * amphetamine-dextroamphetamine 30 MG per 24 hr capsule   Start  taking on:  5/5/2018    ADDERALL XR    30 capsule    Take 1 capsule (30 mg) by mouth daily    Attention deficit hyperactivity disorder (ADHD), combined type, Controlled substance agreement signed       * amphetamine-dextroamphetamine 30 MG per 24 hr capsule   Start taking on:  6/5/2018    ADDERALL XR    30 capsule    Take 1 capsule (30 mg) by mouth daily    Attention deficit hyperactivity disorder (ADHD), combined type, Controlled substance agreement signed       budesonide 32 MCG/ACT spray    RINOCORT AQUA     Spray 1 spray into both nostrils daily        escitalopram 10 MG tablet    LEXAPRO    90 tablet    Take 1 tablet (10 mg) by mouth daily    Major depression in complete remission (H)       LORazepam 0.5 MG tablet    ATIVAN    60 tablet    Take 1 tablet (0.5 mg) by mouth 2 times daily as needed    Attention deficit hyperactivity disorder (ADHD), combined type       losartan 50 MG tablet    COZAAR    90 tablet    Take 1 tablet (50 mg) by mouth daily    Benign essential hypertension       mesalamine 400 MG CR capsule    DELZICOL    240 capsule    Take 2 capsules (800 mg) by mouth 4 times daily as needed    Inflammatory bowel disease       methylPREDNISolone 4 MG tablet    MEDROL     TAKE AS DIRECTED FOR 10 DAYS FOR ULCERATIVE COLITIS FLAIR. REPEAT EVERY 4 WEEKS IF NEEDED. MAXIMUM OF 40 TABLETS MONTHLY        omeprazole 20 MG CR capsule    priLOSEC    90 capsule    Take 1 capsule (20 mg) by mouth daily 30-60 minutes prior to 1st meal of the day - limit use as able    Heartburn       rosuvastatin 40 MG tablet    CRESTOR    90 tablet    Take 1 tablet (40 mg) by mouth daily    Familial hypercholesterolemia       scopolamine 72 hr patch    TRANSDERM    10 patch    Place 1 patch onto the skin every 72 hours on dry, clean, hairless skin every 72 hours. For Motion Sickness    Motion sickness, subsequent encounter       SUMAtriptan 50 MG tablet    IMITREX    6 tablet    Take 1 tablet (50 mg) by mouth every 2 hours as  needed for migraine Max Dose 4 tablets per 24 hrs.    Migraine without aura and without status migrainosus, not intractable       * Notice:  This list has 3 medication(s) that are the same as other medications prescribed for you. Read the directions carefully, and ask your doctor or other care provider to review them with you.

## 2018-04-18 NOTE — NURSING NOTE
Patient presents to the clinic for follow up with Lipid Panel.      Mariela Puentes LPN 4/18/2018 9:05 AM

## 2018-04-19 ASSESSMENT — PATIENT HEALTH QUESTIONNAIRE - PHQ9: SUM OF ALL RESPONSES TO PHQ QUESTIONS 1-9: 0

## 2018-04-19 ASSESSMENT — ANXIETY QUESTIONNAIRES: GAD7 TOTAL SCORE: 0

## 2018-07-20 ENCOUNTER — TELEPHONE (OUTPATIENT)
Dept: INTERNAL MEDICINE | Facility: OTHER | Age: 38
End: 2018-07-20

## 2018-07-20 DIAGNOSIS — Z79.899 CONTROLLED SUBSTANCE AGREEMENT SIGNED: ICD-10-CM

## 2018-07-20 DIAGNOSIS — F90.2 ATTENTION DEFICIT HYPERACTIVITY DISORDER (ADHD), COMBINED TYPE: ICD-10-CM

## 2018-07-20 NOTE — TELEPHONE ENCOUNTER
The patient was contacted and reports he has been out of his medication for a few days and is not able to see Jarad Salcido MD unitl august 22nd 2018. He would like to be worked in on 7- to get a refill on these. Patient knows Jarad Salcido MD is not in clinic until the 24th.  Elisa Chavez LPN on 7/20/2018 at 10:24 AM

## 2018-07-20 NOTE — TELEPHONE ENCOUNTER
Could not get in for a med check for almost a month is going to be out of his controled medication-please call pt

## 2018-07-24 RX ORDER — DEXTROAMPHETAMINE SACCHARATE, AMPHETAMINE ASPARTATE MONOHYDRATE, DEXTROAMPHETAMINE SULFATE AND AMPHETAMINE SULFATE 7.5; 7.5; 7.5; 7.5 MG/1; MG/1; MG/1; MG/1
30 CAPSULE, EXTENDED RELEASE ORAL DAILY
Qty: 30 CAPSULE | Refills: 0 | Status: SHIPPED | OUTPATIENT
Start: 2018-07-24 | End: 2018-08-22

## 2018-07-24 NOTE — TELEPHONE ENCOUNTER
Called patient and let him know that Dr. Salcido did fill the adderall and will then see him next month when he is scheduled.  Will bring rx to [facility] pharmacy for him.  Mariela Alfredo LPN .......7/24/2018 3:30 PM

## 2018-07-24 NOTE — TELEPHONE ENCOUNTER
Patient only needs one month Adderall refill if he cannot get worked into your schedule.  If you fill this he would like it brought to the pharmacy here.  Mariela Alfredo LPN .......7/24/2018 2:43 PM

## 2018-07-24 NOTE — PROGRESS NOTES
Patient Information     Patient Name  Kike Hess MRN  [id number] Sex  Male   1980      Letter by Jarad Salcido MD at      Author:  Jarad Salcido MD Service:  (none) Author Type:  (none)    Filed:   Encounter Date:  2017 Status:  (Other)           Kike Hess  [address] [facility] [address]          2017    To whom it may concern:    Mr. Hess was evaluated today and is clear to work with no restrictions.   He is using and managing his medications appropriately.   He is okay to use heavy machinery without restriction.     Please call if questions.     Sincerely,     Jarad Salcido MD

## 2018-08-06 DIAGNOSIS — G43.009 MIGRAINE WITHOUT AURA AND WITHOUT STATUS MIGRAINOSUS, NOT INTRACTABLE: ICD-10-CM

## 2018-08-09 RX ORDER — SUMATRIPTAN 50 MG/1
TABLET, FILM COATED ORAL
Qty: 6 TABLET | Refills: 11 | Status: SHIPPED | OUTPATIENT
Start: 2018-08-09 | End: 2019-08-16

## 2018-08-09 NOTE — TELEPHONE ENCOUNTER
Routing refill request to provider for review/approval because:  Protocol failed due to    Serotonin Agonist request needs review     LOV 4/18/18    Grecia Harris RN on 8/9/2018 at 8:48 AM

## 2018-08-22 ENCOUNTER — OFFICE VISIT (OUTPATIENT)
Dept: INTERNAL MEDICINE | Facility: OTHER | Age: 38
End: 2018-08-22
Attending: INTERNAL MEDICINE
Payer: COMMERCIAL

## 2018-08-22 VITALS
SYSTOLIC BLOOD PRESSURE: 138 MMHG | HEART RATE: 64 BPM | DIASTOLIC BLOOD PRESSURE: 82 MMHG | BODY MASS INDEX: 29.13 KG/M2 | WEIGHT: 203 LBS

## 2018-08-22 DIAGNOSIS — Z79.899 CONTROLLED SUBSTANCE AGREEMENT SIGNED: ICD-10-CM

## 2018-08-22 DIAGNOSIS — Z12.5 SPECIAL SCREENING FOR MALIGNANT NEOPLASM OF PROSTATE: ICD-10-CM

## 2018-08-22 DIAGNOSIS — K52.9 INFLAMMATORY BOWEL DISEASE: Primary | ICD-10-CM

## 2018-08-22 DIAGNOSIS — E78.01 FAMILIAL HYPERCHOLESTEROLEMIA: ICD-10-CM

## 2018-08-22 DIAGNOSIS — I10 BENIGN ESSENTIAL HYPERTENSION: ICD-10-CM

## 2018-08-22 DIAGNOSIS — H10.33 ACUTE CONJUNCTIVITIS OF BOTH EYES, UNSPECIFIED ACUTE CONJUNCTIVITIS TYPE: ICD-10-CM

## 2018-08-22 DIAGNOSIS — F90.2 ATTENTION DEFICIT HYPERACTIVITY DISORDER (ADHD), COMBINED TYPE: ICD-10-CM

## 2018-08-22 LAB
ALBUMIN SERPL-MCNC: 4.9 G/DL (ref 3.5–5.7)
ALP SERPL-CCNC: 82 U/L (ref 34–104)
ALT SERPL W P-5'-P-CCNC: 68 U/L (ref 7–52)
ANION GAP SERPL CALCULATED.3IONS-SCNC: 13 MMOL/L (ref 3–14)
AST SERPL W P-5'-P-CCNC: 38 U/L (ref 13–39)
BILIRUB SERPL-MCNC: 0.4 MG/DL (ref 0.3–1)
BUN SERPL-MCNC: 12 MG/DL (ref 7–25)
CALCIUM SERPL-MCNC: 10.2 MG/DL (ref 8.6–10.3)
CHLORIDE SERPL-SCNC: 103 MMOL/L (ref 98–107)
CHOLEST SERPL-MCNC: 256 MG/DL
CO2 SERPL-SCNC: 22 MMOL/L (ref 21–31)
CREAT SERPL-MCNC: 0.88 MG/DL (ref 0.7–1.3)
GFR SERPL CREATININE-BSD FRML MDRD: >90 ML/MIN/1.7M2
GLUCOSE SERPL-MCNC: 139 MG/DL (ref 70–105)
HDLC SERPL-MCNC: 44 MG/DL (ref 23–92)
LDLC SERPL CALC-MCNC: ABNORMAL MG/DL
NONHDLC SERPL-MCNC: 212 MG/DL
POTASSIUM SERPL-SCNC: 4 MMOL/L (ref 3.5–5.1)
PROT SERPL-MCNC: 7.6 G/DL (ref 6.4–8.9)
PSA SERPL-ACNC: 0.44 NG/ML
SODIUM SERPL-SCNC: 138 MMOL/L (ref 134–144)
TRIGL SERPL-MCNC: 661 MG/DL

## 2018-08-22 PROCEDURE — G0463 HOSPITAL OUTPT CLINIC VISIT: HCPCS

## 2018-08-22 PROCEDURE — 99214 OFFICE O/P EST MOD 30 MIN: CPT | Performed by: INTERNAL MEDICINE

## 2018-08-22 PROCEDURE — 36415 COLL VENOUS BLD VENIPUNCTURE: CPT | Performed by: INTERNAL MEDICINE

## 2018-08-22 PROCEDURE — 80053 COMPREHEN METABOLIC PANEL: CPT | Performed by: INTERNAL MEDICINE

## 2018-08-22 PROCEDURE — 80061 LIPID PANEL: CPT | Performed by: INTERNAL MEDICINE

## 2018-08-22 PROCEDURE — G0103 PSA SCREENING: HCPCS | Performed by: INTERNAL MEDICINE

## 2018-08-22 RX ORDER — LORAZEPAM 0.5 MG/1
0.5 TABLET ORAL 2 TIMES DAILY PRN
Qty: 60 TABLET | Refills: 5 | Status: SHIPPED | OUTPATIENT
Start: 2018-08-22 | End: 2019-03-01

## 2018-08-22 RX ORDER — DEXTROAMPHETAMINE SACCHARATE, AMPHETAMINE ASPARTATE MONOHYDRATE, DEXTROAMPHETAMINE SULFATE AND AMPHETAMINE SULFATE 7.5; 7.5; 7.5; 7.5 MG/1; MG/1; MG/1; MG/1
30 CAPSULE, EXTENDED RELEASE ORAL DAILY
Qty: 30 CAPSULE | Refills: 0 | Status: SHIPPED | OUTPATIENT
Start: 2018-09-19 | End: 2018-08-22

## 2018-08-22 RX ORDER — METHYLPREDNISOLONE 4 MG/1
TABLET ORAL
Qty: 40 TABLET | Refills: 11 | Status: SHIPPED | OUTPATIENT
Start: 2018-08-22 | End: 2019-08-16

## 2018-08-22 RX ORDER — DEXTROAMPHETAMINE SACCHARATE, AMPHETAMINE ASPARTATE MONOHYDRATE, DEXTROAMPHETAMINE SULFATE AND AMPHETAMINE SULFATE 7.5; 7.5; 7.5; 7.5 MG/1; MG/1; MG/1; MG/1
30 CAPSULE, EXTENDED RELEASE ORAL DAILY
Qty: 30 CAPSULE | Refills: 0 | Status: SHIPPED | OUTPATIENT
Start: 2018-10-17 | End: 2018-11-21

## 2018-08-22 RX ORDER — DEXTROAMPHETAMINE SACCHARATE, AMPHETAMINE ASPARTATE MONOHYDRATE, DEXTROAMPHETAMINE SULFATE AND AMPHETAMINE SULFATE 7.5; 7.5; 7.5; 7.5 MG/1; MG/1; MG/1; MG/1
30 CAPSULE, EXTENDED RELEASE ORAL DAILY
Qty: 30 CAPSULE | Refills: 0 | Status: SHIPPED | OUTPATIENT
Start: 2018-08-22 | End: 2018-08-22

## 2018-08-22 RX ORDER — SULFACETAMIDE SODIUM 100 MG/ML
1 SOLUTION/ DROPS OPHTHALMIC
Qty: 1 BOTTLE | Refills: 0 | Status: SHIPPED | OUTPATIENT
Start: 2018-08-22 | End: 2019-06-21

## 2018-08-22 ASSESSMENT — ENCOUNTER SYMPTOMS
LIGHT-HEADEDNESS: 0
DIARRHEA: 0
WHEEZING: 0
PALPITATIONS: 0
COUGH: 0
ARTHRALGIAS: 0
SHORTNESS OF BREATH: 0
BRUISES/BLEEDS EASILY: 0
EYE REDNESS: 1
MYALGIAS: 0
FEVER: 0
AGITATION: 0
NERVOUS/ANXIOUS: 1
CONFUSION: 0
FATIGUE: 0
CHILLS: 0
DYSURIA: 0
DIZZINESS: 0
EYE ITCHING: 1
NAUSEA: 0
ABDOMINAL PAIN: 0
EYE PAIN: 0
VOMITING: 0
HEMATURIA: 0

## 2018-08-22 ASSESSMENT — ANXIETY QUESTIONNAIRES
GAD7 TOTAL SCORE: 0
5. BEING SO RESTLESS THAT IT IS HARD TO SIT STILL: NOT AT ALL
1. FEELING NERVOUS, ANXIOUS, OR ON EDGE: NOT AT ALL
2. NOT BEING ABLE TO STOP OR CONTROL WORRYING: NOT AT ALL
IF YOU CHECKED OFF ANY PROBLEMS ON THIS QUESTIONNAIRE, HOW DIFFICULT HAVE THESE PROBLEMS MADE IT FOR YOU TO DO YOUR WORK, TAKE CARE OF THINGS AT HOME, OR GET ALONG WITH OTHER PEOPLE: NOT DIFFICULT AT ALL
3. WORRYING TOO MUCH ABOUT DIFFERENT THINGS: NOT AT ALL
7. FEELING AFRAID AS IF SOMETHING AWFUL MIGHT HAPPEN: NOT AT ALL
6. BECOMING EASILY ANNOYED OR IRRITABLE: NOT AT ALL

## 2018-08-22 ASSESSMENT — PAIN SCALES - GENERAL: PAINLEVEL: NO PAIN (0)

## 2018-08-22 ASSESSMENT — PATIENT HEALTH QUESTIONNAIRE - PHQ9: 5. POOR APPETITE OR OVEREATING: NOT AT ALL

## 2018-08-22 NOTE — PATIENT INSTRUCTIONS
Labs today.     Medications refilled.     Results for orders placed or performed in visit on 04/04/18   CBC with platelets and differential   Result Value Ref Range    WBC 6.8 4.0 - 11.0 10e9/L    RBC Count 5.33 4.4 - 5.9 10e12/L    Hemoglobin 17.1 13.3 - 17.7 g/dL    Hematocrit 49.8 40.0 - 53.0 %    MCV 93 78 - 100 fl    MCH 32.1 26.5 - 33.0 pg    MCHC 34.3 31.5 - 36.5 g/dL    RDW 12.8 10.0 - 15.0 %    Platelet Count 228 150 - 450 10e9/L    Diff Method Automated Method     % Neutrophils 47.7 %    % Lymphocytes 40.7 %    % Monocytes 8.9 %    % Eosinophils 1.5 %    % Basophils 0.6 %    % Immature Granulocytes 0.6 %    Absolute Neutrophil 3.3 1.6 - 8.3 10e9/L    Absolute Lymphocytes 2.8 0.8 - 5.3 10e9/L    Absolute Monocytes 0.6 0.0 - 1.3 10e9/L    Absolute Eosinophils 0.1 0.0 - 0.7 10e9/L    Absolute Basophils 0.0 0.0 - 0.2 10e9/L    Abs Immature Granulocytes 0.0 0 - 0.4 10e9/L   Comprehensive metabolic panel (BMP + Alb, Alk Phos, ALT, AST, Total. Bili, TP)   Result Value Ref Range    Sodium 142 134 - 144 mmol/L    Potassium 4.4 3.5 - 5.1 mmol/L    Chloride 103 98 - 107 mmol/L    Carbon Dioxide 26 21 - 31 mmol/L    Anion Gap 13 3 - 14 mmol/L    Glucose 112 (H) 70 - 105 mg/dL    Urea Nitrogen 10 7 - 25 mg/dL    Creatinine 0.97 0.70 - 1.30 mg/dL    GFR Estimate 87 >60 mL/min/1.7m2    GFR Estimate If Black >90 >60 mL/min/1.7m2    Calcium 10.4 (H) 8.6 - 10.3 mg/dL    Bilirubin Total 0.3 0.3 - 1.0 mg/dL    Albumin 4.9 3.5 - 5.7 g/dL    Protein Total 7.6 6.4 - 8.9 g/dL    Alkaline Phosphatase 75 34 - 104 U/L    ALT 81 (H) 7 - 52 U/L    AST 60 (H) 13 - 39 U/L   Hemoglobin A1c   Result Value Ref Range    Hemoglobin A1C 5.5 4.0 - 6.0 %   Lipid Profile (Chol, Trig, HDL, LDL calc) - FASTING   Result Value Ref Range    Cholesterol 346 (H) <200 mg/dL    Triglycerides 692 (H) <150 mg/dL    HDL Cholesterol 50 23 - 92 mg/dL    LDL Cholesterol Calculated  <100 mg/dL     Cannot estimate LDL when triglyceride exceeds 400 mg/dL    Non  HDL Cholesterol 296 (H) <130 mg/dL        Return in approximately 3 month(s), or sooner as needed for follow-up with Dr. Salcido.    Clinic : [phone]  Appointment line: [phone]

## 2018-08-22 NOTE — PROGRESS NOTES
"Nursing Notes:   Mariela Puentes LPN  8/22/2018  8:59 AM  Signed  Patient presents to the clinic for medication management, last administration of Adderall was this am.  TOX Screen 12-21-16.      Chief Complaint   Patient presents with     Recheck Medication       Initial There were no vitals taken for this visit. Estimated body mass index is 28.7 kg/(m^2) as calculated from the following:    Height as of 3/10/18: 5' 10\" (1.778 m).    Weight as of 4/18/18: 200 lb (90.7 kg).  Medication Reconciliation: complete    Mariela Puentes LPN    Nursing note reviewed with patient.  Accurracy and completeness verified.   Mr. Hess is a 38 year old male who:  Patient presents with:  Recheck Medication    HPI     ICD-10-CM    1. Inflammatory bowel disease -- not definitive of UC vs chrons based on blood work in 2006 K52.9 methylPREDNISolone (MEDROL) 4 MG tablet   2. Special screening for malignant neoplasm of prostate Z12.5 PSA Screen GH   3. Benign essential hypertension I10 Comprehensive metabolic panel   4. Familial hypercholesterolemia - at least heterozygote E78.01 Lipid Profile   5. Attention deficit hyperactivity disorder (ADHD), combined type F90.2 LORazepam (ATIVAN) 0.5 MG tablet     amphetamine-dextroamphetamine (ADDERALL XR) 30 MG per 24 hr capsule     DISCONTINUED: amphetamine-dextroamphetamine (ADDERALL XR) 30 MG per 24 hr capsule     DISCONTINUED: amphetamine-dextroamphetamine (ADDERALL XR) 30 MG per 24 hr capsule     DISCONTINUED: amphetamine-dextroamphetamine (ADDERALL XR) 30 MG per 24 hr capsule   6. Controlled substance agreement signed 1-3-18 Z79.899 amphetamine-dextroamphetamine (ADDERALL XR) 30 MG per 24 hr capsule     DISCONTINUED: amphetamine-dextroamphetamine (ADDERALL XR) 30 MG per 24 hr capsule     DISCONTINUED: amphetamine-dextroamphetamine (ADDERALL XR) 30 MG per 24 hr capsule     DISCONTINUED: amphetamine-dextroamphetamine (ADDERALL XR) 30 MG per 24 hr capsule   7. Acute conjunctivitis of both " eyes, unspecified acute conjunctivitis type H10.33 sulfacetamide (BLEPH-10) 10 % ophthalmic solution     Inflammatory bowel disease, possibly ulcerative colitis but diagnosis was never definitive.  Taking treatments as if he has ulcerative colitis and does do well with this. Refill medrol.     Prostate cancer screening today.  Check PSA.    Hypertension, currently well controlled.  Tolerating medication.  Needs labs.  Refills are up-to-date.    Familial hyperlipidemia.  Last labs in April were very high.  He has been on high-dose Lipitor now for about 3 months.  Tolerating well.  Only had a few myalgias initially.  He is taking some fish oil/omega 3 but not regularly.  Also taking coenzyme Q 10.  Check labs.    ADHD, chronic, controlled with current medication regimen.  Refills today.    Conjunctivitis, possibly allergic versus infectious.  Gets intermittently.  Has been developing more allergy issues.  Prescription for sulfacetamide eyedrops sent to pharmacy.    Functional Capacity: > 4 METS.   Reports that he can climb a flight of stairs without any chest pain/heaviness or shortness of breath.   No orthopnea/paroxysmal nocturnal dyspnea  Review of Systems   Constitutional: Negative for chills, fatigue and fever.   HENT: Negative for congestion and hearing loss.    Eyes: Positive for redness and itching. Negative for pain and visual disturbance.   Respiratory: Negative for cough, shortness of breath and wheezing.    Cardiovascular: Negative for chest pain and palpitations.   Gastrointestinal: Negative for abdominal pain, diarrhea, nausea and vomiting.   Endocrine: Negative for cold intolerance and heat intolerance.   Genitourinary: Negative for dysuria and hematuria.   Musculoskeletal: Negative for arthralgias and myalgias.   Skin: Negative for pallor.   Allergic/Immunologic: Positive for environmental allergies and food allergies. Negative for immunocompromised state.   Neurological: Negative for dizziness and  light-headedness.   Hematological: Does not bruise/bleed easily.   Psychiatric/Behavioral: Negative for agitation and confusion. The patient is nervous/anxious.         ANNMARIE:   ANNMARIE-7 SCORE 4/4/2018 4/18/2018 8/22/2018   Total Score 0 0 0     PHQ9:  PHQ-9 SCORE 1/3/2018 4/18/2018 8/22/2018   Total Score 0 0 0       I have personally reviewed the past medical history, past surgical history, medications, allergies, family and social history as listed below, on 8/22/2018.    Allergies   Allergen Reactions     Cats      Other reaction(s): Runny Nose  Itchy eyes and hand swelling     Food      Other reaction(s): Angioedema  Raw Parsnip AND Raw Carrots     Lisinopril Cough       Current Outpatient Prescriptions   Medication Sig Dispense Refill     amitriptyline (ELAVIL) 25 MG tablet Take 2 tablets (50 mg) by mouth At Bedtime 180 tablet 3     amLODIPine (NORVASC) 5 MG tablet Take 1 tablet (5 mg) by mouth daily 90 tablet 3     [START ON 10/17/2018] amphetamine-dextroamphetamine (ADDERALL XR) 30 MG per 24 hr capsule Take 1 capsule (30 mg) by mouth daily 30 capsule 0     atorvastatin (LIPITOR) 80 MG tablet Take 1 tablet (80 mg) by mouth daily 90 tablet 3     budesonide (RINOCORT AQUA) 32 MCG/ACT spray Spray 1 spray into both nostrils daily       escitalopram (LEXAPRO) 10 MG tablet Take 1 tablet (10 mg) by mouth daily 90 tablet 3     LORazepam (ATIVAN) 0.5 MG tablet Take 1 tablet (0.5 mg) by mouth 2 times daily as needed 60 tablet 5     losartan (COZAAR) 50 MG tablet Take 1 tablet (50 mg) by mouth daily 90 tablet 3     mesalamine (DELZICOL) 400 MG CR capsule Take 2 capsules (800 mg) by mouth 4 times daily as needed 240 capsule 11     methylPREDNISolone (MEDROL) 4 MG tablet TAKE AS DIRECTED FOR 10 DAYS FOR ULCERATIVE COLITIS FLAIR. REPEAT EVERY 4 WEEKS IF NEEDED. MAXIMUM OF 40 TABLETS MONTHLY 40 tablet 11     omeprazole (PRILOSEC) 20 MG CR capsule Take 1 capsule (20 mg) by mouth daily 30-60 minutes prior to 1st meal of the day -  limit use as able 90 capsule 3     scopolamine (TRANSDERM) 72 hr patch Place 1 patch onto the skin every 72 hours on dry, clean, hairless skin every 72 hours. For Motion Sickness 10 patch 11     sulfacetamide (BLEPH-10) 10 % ophthalmic solution Apply 1 drop to eye every 4 hours (while awake) for 7 days 1 Bottle 0     SUMAtriptan (IMITREX) 50 MG tablet Take 1 tablet (50 mg) by mouth every 2 hours as needed for migraine Max Dose 4 tablets per 24 hrs. 6 tablet 11        Patient Active Problem List    Diagnosis Date Noted     Familial hypercholesterolemia - at least heterozygote 04/18/2018     Priority: Medium     Major depression in complete remission (H) 04/04/2018     Priority: Medium     Anxiety 12/01/2015     Priority: Medium     Attention deficit hyperactivity disorder (ADHD), combined type 12/01/2015     Priority: Medium     Controlled substance agreement signed 1-3-18 12/01/2015     Priority: Medium     Migraine without aura and without status migrainosus, not intractable 06/19/2015     Priority: Medium     Benign essential hypertension 08/13/2014     Priority: Medium     Motion sickness 08/13/2014     Priority: Medium     Sprain of left thumb 08/13/2014     Priority: Medium     Social phobia 12/19/2013     Priority: Medium     Allergic rhinitis due to cat hair 12/10/2013     Priority: Medium     Dust allergy 12/10/2013     Priority: Medium     Elevated liver enzymes 12/10/2013     Priority: Medium     GERD (gastroesophageal reflux disease) 12/10/2013     Priority: Medium     Hair loss 12/10/2013     Priority: Medium     Lateral epicondylitis 12/10/2013     Priority: Medium     Medial epicondylitis of elbow 12/10/2013     Priority: Medium     Seasonal allergic rhinitis 12/10/2013     Priority: Medium     Sleeping difficulties 12/10/2013     Priority: Medium     Inflammatory bowel disease -- not definitive of UC vs chrons based on blood work in 2006 08/09/2007     Priority: Medium     Past Medical History:    Diagnosis Date     Allergic rhinitis due to animal hair and dander     12/10/2013     Gastro-esophageal reflux disease without esophagitis     12/10/2013     Major depressive disorder, single episode     12/10/2013,Previously followed with Psychiatry - was on Remeron, Adderall, Lexapro in the past. Awaiting to re-establish psychiatry care again.     Migraine without status migrainosus, not intractable     12/10/2013     Other allergic rhinitis     12/10/2013     Other seasonal allergic rhinitis     12/10/2013     Sleep disorder     12/10/2013     Ulcerative colitis without complications (H)     8/9/2007     Past Surgical History:   Procedure Laterality Date     APPENDECTOMY OPEN      2/4/13,Dr. 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    Moved back to [facility] in about November 2012.     For family mental health issues, he notes that an aunt committed suicide in    October 2013 and 3 maternal aunts and his mother are on antidepressants. His    maternal grandmother was diagnosed with schizoaffective disorder, as was a    male cousin. Attention deficit hyperactivity disorder was significant for an    uncle and the client's cousins.     Family History   Problem Relation Age of Onset     Colon Cancer Paternal Grandmother      Cancer-colon     Colon Cancer Maternal Grandmother       "Cancer-colon     Other - See Comments Other      Psychiatric illness,Aunt - suicide fall 2013     EXAM:   Vitals:    08/22/18 0856   BP: 138/82   BP Location: Right arm   Patient Position: Sitting   Cuff Size: Adult Regular   Pulse: 64   Weight: 203 lb (92.1 kg)       Current Pain Score: No Pain (0)     BP Readings from Last 3 Encounters:   08/22/18 138/82   04/18/18 118/74   04/04/18 128/70    Wt Readings from Last 3 Encounters:   08/22/18 203 lb (92.1 kg)   04/18/18 200 lb (90.7 kg)   04/04/18 205 lb (93 kg)      Estimated body mass index is 29.13 kg/(m^2) as calculated from the following:    Height as of 3/10/18: 5' 10\" (1.778 m).    Weight as of this encounter: 203 lb (92.1 kg).     Physical Exam   Constitutional: He appears well-developed and well-nourished. No distress.   HENT:   Head: Normocephalic and atraumatic.   Eyes: No scleral icterus.   Extraocular muscles are intact.  Mild conjunctival irritation.  Some minimal periorbital skin erythema.   Neck: No thyromegaly present.   Cardiovascular: Normal rate and regular rhythm.    No carotid Bruits   Pulmonary/Chest: Effort normal. No respiratory distress. He has no wheezes.   Abdominal: Soft. There is no tenderness.   Musculoskeletal: He exhibits no tenderness or deformity.   Lymphadenopathy:     He has no cervical adenopathy.   Neurological: He is alert. No cranial nerve deficit.   Skin: Skin is warm and dry.   Psychiatric: He has a normal mood and affect.        INVESTIGATIONS:  Results for orders placed or performed in visit on 04/04/18   CBC with platelets and differential   Result Value Ref Range    WBC 6.8 4.0 - 11.0 10e9/L    RBC Count 5.33 4.4 - 5.9 10e12/L    Hemoglobin 17.1 13.3 - 17.7 g/dL    Hematocrit 49.8 40.0 - 53.0 %    MCV 93 78 - 100 fl    MCH 32.1 26.5 - 33.0 pg    MCHC 34.3 31.5 - 36.5 g/dL    RDW 12.8 10.0 - 15.0 %    Platelet Count 228 150 - 450 10e9/L    Diff Method Automated Method     % Neutrophils 47.7 %    % Lymphocytes 40.7 %    % " Monocytes 8.9 %    % Eosinophils 1.5 %    % Basophils 0.6 %    % Immature Granulocytes 0.6 %    Absolute Neutrophil 3.3 1.6 - 8.3 10e9/L    Absolute Lymphocytes 2.8 0.8 - 5.3 10e9/L    Absolute Monocytes 0.6 0.0 - 1.3 10e9/L    Absolute Eosinophils 0.1 0.0 - 0.7 10e9/L    Absolute Basophils 0.0 0.0 - 0.2 10e9/L    Abs Immature Granulocytes 0.0 0 - 0.4 10e9/L   Comprehensive metabolic panel (BMP + Alb, Alk Phos, ALT, AST, Total. Bili, TP)   Result Value Ref Range    Sodium 142 134 - 144 mmol/L    Potassium 4.4 3.5 - 5.1 mmol/L    Chloride 103 98 - 107 mmol/L    Carbon Dioxide 26 21 - 31 mmol/L    Anion Gap 13 3 - 14 mmol/L    Glucose 112 (H) 70 - 105 mg/dL    Urea Nitrogen 10 7 - 25 mg/dL    Creatinine 0.97 0.70 - 1.30 mg/dL    GFR Estimate 87 >60 mL/min/1.7m2    GFR Estimate If Black >90 >60 mL/min/1.7m2    Calcium 10.4 (H) 8.6 - 10.3 mg/dL    Bilirubin Total 0.3 0.3 - 1.0 mg/dL    Albumin 4.9 3.5 - 5.7 g/dL    Protein Total 7.6 6.4 - 8.9 g/dL    Alkaline Phosphatase 75 34 - 104 U/L    ALT 81 (H) 7 - 52 U/L    AST 60 (H) 13 - 39 U/L   Hemoglobin A1c   Result Value Ref Range    Hemoglobin A1C 5.5 4.0 - 6.0 %   Lipid Profile (Chol, Trig, HDL, LDL calc) - FASTING   Result Value Ref Range    Cholesterol 346 (H) <200 mg/dL    Triglycerides 692 (H) <150 mg/dL    HDL Cholesterol 50 23 - 92 mg/dL    LDL Cholesterol Calculated  <100 mg/dL     Cannot estimate LDL when triglyceride exceeds 400 mg/dL    Non HDL Cholesterol 296 (H) <130 mg/dL       ASSESSMENT AND PLAN:  Problem List Items Addressed This Visit        Nervous and Auditory    Controlled substance agreement signed 1-3-18    Relevant Medications    amphetamine-dextroamphetamine (ADDERALL XR) 30 MG per 24 hr capsule (Start on 10/17/2018)       Digestive    Inflammatory bowel disease -- not definitive of UC vs chrons based on blood work in 2006 - Primary    Relevant Medications    methylPREDNISolone (MEDROL) 4 MG tablet       Endocrine    Familial hypercholesterolemia  - at least heterozygote    Relevant Orders    Lipid Profile       Circulatory    Benign essential hypertension    Relevant Orders    Comprehensive metabolic panel       Behavioral    Attention deficit hyperactivity disorder (ADHD), combined type    Relevant Medications    LORazepam (ATIVAN) 0.5 MG tablet    amphetamine-dextroamphetamine (ADDERALL XR) 30 MG per 24 hr capsule (Start on 10/17/2018)      Other Visit Diagnoses     Special screening for malignant neoplasm of prostate        Relevant Orders    PSA Screen GH    Acute conjunctivitis of both eyes, unspecified acute conjunctivitis type        Relevant Medications    sulfacetamide (BLEPH-10) 10 % ophthalmic solution        reviewed diet, exercise and weight control, cardiovascular risk and specific lipid/LDL goals reviewed  -- Expected clinical course discussed    -- Medications and their side effects discussed    Patient Instructions     Labs today.     Medications refilled.     Results for orders placed or performed in visit on 04/04/18   CBC with platelets and differential   Result Value Ref Range    WBC 6.8 4.0 - 11.0 10e9/L    RBC Count 5.33 4.4 - 5.9 10e12/L    Hemoglobin 17.1 13.3 - 17.7 g/dL    Hematocrit 49.8 40.0 - 53.0 %    MCV 93 78 - 100 fl    MCH 32.1 26.5 - 33.0 pg    MCHC 34.3 31.5 - 36.5 g/dL    RDW 12.8 10.0 - 15.0 %    Platelet Count 228 150 - 450 10e9/L    Diff Method Automated Method     % Neutrophils 47.7 %    % Lymphocytes 40.7 %    % Monocytes 8.9 %    % Eosinophils 1.5 %    % Basophils 0.6 %    % Immature Granulocytes 0.6 %    Absolute Neutrophil 3.3 1.6 - 8.3 10e9/L    Absolute Lymphocytes 2.8 0.8 - 5.3 10e9/L    Absolute Monocytes 0.6 0.0 - 1.3 10e9/L    Absolute Eosinophils 0.1 0.0 - 0.7 10e9/L    Absolute Basophils 0.0 0.0 - 0.2 10e9/L    Abs Immature Granulocytes 0.0 0 - 0.4 10e9/L   Comprehensive metabolic panel (BMP + Alb, Alk Phos, ALT, AST, Total. Bili, TP)   Result Value Ref Range    Sodium 142 134 - 144 mmol/L    Potassium  4.4 3.5 - 5.1 mmol/L    Chloride 103 98 - 107 mmol/L    Carbon Dioxide 26 21 - 31 mmol/L    Anion Gap 13 3 - 14 mmol/L    Glucose 112 (H) 70 - 105 mg/dL    Urea Nitrogen 10 7 - 25 mg/dL    Creatinine 0.97 0.70 - 1.30 mg/dL    GFR Estimate 87 >60 mL/min/1.7m2    GFR Estimate If Black >90 >60 mL/min/1.7m2    Calcium 10.4 (H) 8.6 - 10.3 mg/dL    Bilirubin Total 0.3 0.3 - 1.0 mg/dL    Albumin 4.9 3.5 - 5.7 g/dL    Protein Total 7.6 6.4 - 8.9 g/dL    Alkaline Phosphatase 75 34 - 104 U/L    ALT 81 (H) 7 - 52 U/L    AST 60 (H) 13 - 39 U/L   Hemoglobin A1c   Result Value Ref Range    Hemoglobin A1C 5.5 4.0 - 6.0 %   Lipid Profile (Chol, Trig, HDL, LDL calc) - FASTING   Result Value Ref Range    Cholesterol 346 (H) <200 mg/dL    Triglycerides 692 (H) <150 mg/dL    HDL Cholesterol 50 23 - 92 mg/dL    LDL Cholesterol Calculated  <100 mg/dL     Cannot estimate LDL when triglyceride exceeds 400 mg/dL    Non HDL Cholesterol 296 (H) <130 mg/dL        Return in approximately 3 month(s), or sooner as needed for follow-up with Dr. Salcido.    Clinic : [phone]  Appointment line: [phone]      Jarad Salcido MD  Internal Medicine  [facility]

## 2018-08-22 NOTE — NURSING NOTE
"Patient presents to the clinic for medication management, last administration of Adderall was this am.  TOX Screen 12-21-16.      Chief Complaint   Patient presents with     Recheck Medication       Initial There were no vitals taken for this visit. Estimated body mass index is 28.7 kg/(m^2) as calculated from the following:    Height as of 3/10/18: 5' 10\" (1.778 m).    Weight as of 4/18/18: 200 lb (90.7 kg).  Medication Reconciliation: complete    Mariela Puentes LPN    "

## 2018-08-22 NOTE — MR AVS SNAPSHOT
After Visit Summary   8/22/2018    Kike Hess    MRN: [id number]           Patient Information     Date Of Birth          1980        Visit Information        Provider Department      8/22/2018 8:40 AM Jarad Salcido MD [facility]        Today's Diagnoses     Inflammatory bowel disease -- not definitive of UC vs chrons based on blood work in 2006    -  1    Special screening for malignant neoplasm of prostate        Benign essential hypertension        Familial hypercholesterolemia - at least heterozygote        Attention deficit hyperactivity disorder (ADHD), combined type        Controlled substance agreement signed 1-3-18        Acute conjunctivitis of both eyes, unspecified acute conjunctivitis type          Care Instructions    Labs today.     Medications refilled.     Results for orders placed or performed in visit on 04/04/18   CBC with platelets and differential   Result Value Ref Range    WBC 6.8 4.0 - 11.0 10e9/L    RBC Count 5.33 4.4 - 5.9 10e12/L    Hemoglobin 17.1 13.3 - 17.7 g/dL    Hematocrit 49.8 40.0 - 53.0 %    MCV 93 78 - 100 fl    MCH 32.1 26.5 - 33.0 pg    MCHC 34.3 31.5 - 36.5 g/dL    RDW 12.8 10.0 - 15.0 %    Platelet Count 228 150 - 450 10e9/L    Diff Method Automated Method     % Neutrophils 47.7 %    % Lymphocytes 40.7 %    % Monocytes 8.9 %    % Eosinophils 1.5 %    % Basophils 0.6 %    % Immature Granulocytes 0.6 %    Absolute Neutrophil 3.3 1.6 - 8.3 10e9/L    Absolute Lymphocytes 2.8 0.8 - 5.3 10e9/L    Absolute Monocytes 0.6 0.0 - 1.3 10e9/L    Absolute Eosinophils 0.1 0.0 - 0.7 10e9/L    Absolute Basophils 0.0 0.0 - 0.2 10e9/L    Abs Immature Granulocytes 0.0 0 - 0.4 10e9/L   Comprehensive metabolic panel (BMP + Alb, Alk Phos, ALT, AST, Total. Bili, TP)   Result Value Ref Range    Sodium 142 134 - 144 mmol/L    Potassium 4.4 3.5 - 5.1 mmol/L    Chloride 103 98 - 107 mmol/L    Carbon Dioxide 26 21 - 31 mmol/L    Anion Gap 13 3 - 14  mmol/L    Glucose 112 (H) 70 - 105 mg/dL    Urea Nitrogen 10 7 - 25 mg/dL    Creatinine 0.97 0.70 - 1.30 mg/dL    GFR Estimate 87 >60 mL/min/1.7m2    GFR Estimate If Black >90 >60 mL/min/1.7m2    Calcium 10.4 (H) 8.6 - 10.3 mg/dL    Bilirubin Total 0.3 0.3 - 1.0 mg/dL    Albumin 4.9 3.5 - 5.7 g/dL    Protein Total 7.6 6.4 - 8.9 g/dL    Alkaline Phosphatase 75 34 - 104 U/L    ALT 81 (H) 7 - 52 U/L    AST 60 (H) 13 - 39 U/L   Hemoglobin A1c   Result Value Ref Range    Hemoglobin A1C 5.5 4.0 - 6.0 %   Lipid Profile (Chol, Trig, HDL, LDL calc) - FASTING   Result Value Ref Range    Cholesterol 346 (H) <200 mg/dL    Triglycerides 692 (H) <150 mg/dL    HDL Cholesterol 50 23 - 92 mg/dL    LDL Cholesterol Calculated  <100 mg/dL     Cannot estimate LDL when triglyceride exceeds 400 mg/dL    Non HDL Cholesterol 296 (H) <130 mg/dL        Return in approximately 3 month(s), or sooner as needed for follow-up with Dr. Salcido.    Clinic : [phone]  Appointment line: [phone]            Follow-ups after your visit        Future tests that were ordered for you today     Open Standing Orders        Priority Remaining Interval Expires Ordered    Comprehensive metabolic panel Routine 4/4 Every 3 Months 8/22/2019 8/22/2018    Lipid Profile Routine 4/4 Every 3 Months 8/22/2019 8/22/2018          Open Future Orders        Priority Expected Expires Ordered    PSA Screen GH Routine  8/22/2019 8/22/2018            Who to contact     If you have questions or need follow up information about today's clinic visit or your schedule please contact [facility] directly at [phone].  Normal or non-critical lab and imaging results will be communicated to you by MyChart, letter or phone within 4 business days after the clinic has received the results. If you do not hear from us within 7 days, please contact the clinic through MyChart or phone. If you have a critical or abnormal lab result, we will notify you by  phone as soon as possible.  Submit refill requests through [employer] or call your pharmacy and they will forward the refill request to us. Please allow 3 business days for your refill to be completed.          Additional Information About Your Visit        Care EveryWhere ID     This is your Care EveryWhere ID. This could be used by other organizations to access your [facility] medical records  [id number]        Your Vitals Were     Pulse BMI (Body Mass Index)                64 29.13 kg/m2           Blood Pressure from Last 3 Encounters:   08/22/18 138/82   04/18/18 118/74   04/04/18 128/70    Weight from Last 3 Encounters:   08/22/18 203 lb (92.1 kg)   04/18/18 200 lb (90.7 kg)   04/04/18 205 lb (93 kg)                 Today's Medication Changes          These changes are accurate as of 8/22/18  9:14 AM.  If you have any questions, ask your nurse or doctor.               Start taking these medicines.        Dose/Directions    amphetamine-dextroamphetamine 30 MG per 24 hr capsule   Commonly known as:  ADDERALL XR   Used for:  Attention deficit hyperactivity disorder (ADHD), combined type, Controlled substance agreement signed   Started by:  Jarad Salcido MD        Dose:  30 mg   Start taking on:  10/17/2018   Take 1 capsule (30 mg) by mouth daily   Quantity:  30 capsule   Refills:  0       sulfacetamide 10 % ophthalmic solution   Commonly known as:  BLEPH-10   Used for:  Acute conjunctivitis of both eyes, unspecified acute conjunctivitis type   Started by:  Jarad Salcido MD        Dose:  1 drop   Apply 1 drop to eye every 4 hours (while awake) for 7 days   Quantity:  1 Bottle   Refills:  0            Where to get your medicines      These medications were sent to [facility], - [place] - [address]  [address], [place] [address]     Phone:  [phone]     methylPREDNISolone 4 MG tablet    sulfacetamide 10 % ophthalmic solution         Some of these will need  a paper prescription and others can be bought over the counter.  Ask your nurse if you have questions.     Bring a paper prescription for each of these medications     amphetamine-dextroamphetamine 30 MG per 24 hr capsule    LORazepam 0.5 MG tablet                Primary Care Provider Office Phone # Fax #    Jarad Salcido [phone] [address]   [facility] [address]        Equal Access to Services     [facility] : Hadii aad ku hadasho Soomaali, waaxda luqadaha, qaybta kaalmada adeegyada, waxay idiin hayaan adeeg kharash latrue . So [facility] [phone].    ATENCIÓN: Si habla maria victoria, tiene a raymundo disposición servicios gratuitos de asistencia lingüística. Llame al [phone].    We comply with applicable federal civil rights laws and [place] laws. We do not discriminate on the basis of race, color, national origin, age, disability, sex, sexual orientation, or gender identity.            Thank you!     Thank you for choosing [facility]  for your care. Our goal is always to provide you with excellent care. Hearing back from our patients is one way we can continue to improve our services. Please take a few minutes to complete the written survey that you may receive in the mail after your visit with us. Thank you!             Your Updated Medication List - Protect others around you: Learn how to safely use, store and throw away your medicines at www.disposemymeds.org.          This list is accurate as of 8/22/18  9:14 AM.  Always use your most recent med list.                   Brand Name Dispense Instructions for use Diagnosis    amitriptyline 25 MG tablet    ELAVIL    180 tablet    Take 2 tablets (50 mg) by mouth At Bedtime    Major depression in complete remission (H)       amLODIPine 5 MG tablet    NORVASC    90 tablet    Take 1 tablet (5 mg) by mouth daily    Benign essential hypertension       amphetamine-dextroamphetamine 30 MG per 24 hr capsule   Start  taking on:  10/17/2018    ADDERALL XR    30 capsule    Take 1 capsule (30 mg) by mouth daily    Attention deficit hyperactivity disorder (ADHD), combined type, Controlled substance agreement signed       atorvastatin 80 MG tablet    LIPITOR    90 tablet    Take 1 tablet (80 mg) by mouth daily    Essential familial hypercholesterolemia       budesonide 32 MCG/ACT spray    RINOCORT AQUA     Spray 1 spray into both nostrils daily        escitalopram 10 MG tablet    LEXAPRO    90 tablet    Take 1 tablet (10 mg) by mouth daily    Major depression in complete remission (H)       LORazepam 0.5 MG tablet    ATIVAN    60 tablet    Take 1 tablet (0.5 mg) by mouth 2 times daily as needed    Attention deficit hyperactivity disorder (ADHD), combined type       losartan 50 MG tablet    COZAAR    90 tablet    Take 1 tablet (50 mg) by mouth daily    Benign essential hypertension       mesalamine 400 MG CR capsule    DELZICOL    240 capsule    Take 2 capsules (800 mg) by mouth 4 times daily as needed    Inflammatory bowel disease       methylPREDNISolone 4 MG tablet    MEDROL    40 tablet    TAKE AS DIRECTED FOR 10 DAYS FOR ULCERATIVE COLITIS FLAIR. REPEAT EVERY 4 WEEKS IF NEEDED. MAXIMUM OF 40 TABLETS MONTHLY    Inflammatory bowel disease       omeprazole 20 MG CR capsule    priLOSEC    90 capsule    Take 1 capsule (20 mg) by mouth daily 30-60 minutes prior to 1st meal of the day - limit use as able    Heartburn       scopolamine 72 hr patch    TRANSDERM    10 patch    Place 1 patch onto the skin every 72 hours on dry, clean, hairless skin every 72 hours. For Motion Sickness    Motion sickness, subsequent encounter       sulfacetamide 10 % ophthalmic solution    BLEPH-10    1 Bottle    Apply 1 drop to eye every 4 hours (while awake) for 7 days    Acute conjunctivitis of both eyes, unspecified acute conjunctivitis type       SUMAtriptan 50 MG tablet    IMITREX    6 tablet    Take 1 tablet (50 mg) by mouth every 2 hours as needed for  migraine Max Dose 4 tablets per 24 hrs.    Migraine without aura and without status migrainosus, not intractable

## 2018-08-22 NOTE — LETTER
Kike Hess  [address] [place] [address]    8/22/2018      Dear Kike Hess,    The result of your recent tests are included below:    PSA is normal.    Cholesterol is still very high.  We may need to consider taking 4000 mg of omega-3 daily to help lower your triglycerides.    Results for orders placed or performed in visit on 08/22/18   PSA Screen GH   Result Value Ref Range    PSA Screen 0.438 <3.100 ng/mL   Comprehensive metabolic panel   Result Value Ref Range    Sodium 138 134 - 144 mmol/L    Potassium 4.0 3.5 - 5.1 mmol/L    Chloride 103 98 - 107 mmol/L    Carbon Dioxide 22 21 - 31 mmol/L    Anion Gap 13 3 - 14 mmol/L    Glucose 139 (H) 70 - 105 mg/dL    Urea Nitrogen 12 7 - 25 mg/dL    Creatinine 0.88 0.70 - 1.30 mg/dL    GFR Estimate >90 >60 mL/min/1.7m2    GFR Estimate If Black >90 >60 mL/min/1.7m2    Calcium 10.2 8.6 - 10.3 mg/dL    Bilirubin Total 0.4 0.3 - 1.0 mg/dL    Albumin 4.9 3.5 - 5.7 g/dL    Protein Total 7.6 6.4 - 8.9 g/dL    Alkaline Phosphatase 82 34 - 104 U/L    ALT 68 (H) 7 - 52 U/L    AST 38 13 - 39 U/L   Lipid Profile   Result Value Ref Range    Cholesterol 256 (H) <200 mg/dL    Triglycerides 661 (H) <150 mg/dL    HDL Cholesterol 44 23 - 92 mg/dL    LDL Cholesterol Calculated  <100 mg/dL     Cannot estimate LDL when triglyceride exceeds 400 mg/dL    Non HDL Cholesterol 212 (H) <130 mg/dL       If you have any further questions or problems, please contact my office at [phone] and schedule an appointment.    Clinic : [phone]  Appointment line: [phone]     Thank you,    Jarad Salcido MD    Internal Medicine  [facility] [place] [facility]     Reviewed and electronically signed by provider.

## 2018-08-24 ASSESSMENT — PATIENT HEALTH QUESTIONNAIRE - PHQ9: SUM OF ALL RESPONSES TO PHQ QUESTIONS 1-9: 0

## 2018-08-24 ASSESSMENT — ANXIETY QUESTIONNAIRES: GAD7 TOTAL SCORE: 0

## 2018-09-21 ENCOUNTER — TELEPHONE (OUTPATIENT)
Dept: INTERNAL MEDICINE | Facility: OTHER | Age: 38
End: 2018-09-21

## 2018-09-21 DIAGNOSIS — E78.01 FAMILIAL HYPERCHOLESTEROLEMIA: Primary | ICD-10-CM

## 2018-09-21 RX ORDER — OMEGA-3-ACID ETHYL ESTERS 1 G/1
1 CAPSULE, LIQUID FILLED ORAL 4 TIMES DAILY
Qty: 360 CAPSULE | Refills: 3 | Status: SHIPPED | OUTPATIENT
Start: 2018-09-21 | End: 2019-08-16

## 2018-09-21 NOTE — TELEPHONE ENCOUNTER
Patient would like a prescription for Lovaza 1gm QID sent to [facility] pharmacy if appropriate. Thanks

## 2018-11-21 ENCOUNTER — OFFICE VISIT (OUTPATIENT)
Dept: INTERNAL MEDICINE | Facility: OTHER | Age: 38
End: 2018-11-21
Attending: INTERNAL MEDICINE
Payer: COMMERCIAL

## 2018-11-21 VITALS
HEART RATE: 72 BPM | BODY MASS INDEX: 28.7 KG/M2 | DIASTOLIC BLOOD PRESSURE: 76 MMHG | WEIGHT: 200 LBS | SYSTOLIC BLOOD PRESSURE: 130 MMHG

## 2018-11-21 DIAGNOSIS — F90.2 ATTENTION DEFICIT HYPERACTIVITY DISORDER (ADHD), COMBINED TYPE: Primary | ICD-10-CM

## 2018-11-21 DIAGNOSIS — R74.8 ELEVATED LIVER ENZYMES: ICD-10-CM

## 2018-11-21 DIAGNOSIS — Z79.899 CONTROLLED SUBSTANCE AGREEMENT SIGNED: ICD-10-CM

## 2018-11-21 DIAGNOSIS — E78.01 FAMILIAL HYPERCHOLESTEROLEMIA: ICD-10-CM

## 2018-11-21 DIAGNOSIS — I10 BENIGN ESSENTIAL HYPERTENSION: ICD-10-CM

## 2018-11-21 DIAGNOSIS — Z23 NEED FOR PROPHYLACTIC VACCINATION AND INOCULATION AGAINST INFLUENZA: ICD-10-CM

## 2018-11-21 LAB
ALBUMIN SERPL-MCNC: 4.7 G/DL (ref 3.5–5.7)
ALP SERPL-CCNC: 79 U/L (ref 34–104)
ALT SERPL W P-5'-P-CCNC: 135 U/L (ref 7–52)
AMPHETAMINES UR QL SCN: ABNORMAL
ANION GAP SERPL CALCULATED.3IONS-SCNC: 11 MMOL/L (ref 3–14)
AST SERPL W P-5'-P-CCNC: 93 U/L (ref 13–39)
BARBITURATES UR QL: NOT DETECTED
BENZODIAZ UR QL: NOT DETECTED
BILIRUB SERPL-MCNC: 0.4 MG/DL (ref 0.3–1)
BUN SERPL-MCNC: 22 MG/DL (ref 7–25)
BUPRENORPHINE UR QL: NOT DETECTED NG/ML
CALCIUM SERPL-MCNC: 10.1 MG/DL (ref 8.6–10.3)
CANNABINOIDS UR QL: NOT DETECTED NG/ML
CHLORIDE SERPL-SCNC: 102 MMOL/L (ref 98–107)
CHOLEST SERPL-MCNC: 234 MG/DL
CO2 SERPL-SCNC: 24 MMOL/L (ref 21–31)
COCAINE UR QL: NOT DETECTED
CREAT SERPL-MCNC: 1.02 MG/DL (ref 0.7–1.3)
D-METHAMPHET UR QL: NOT DETECTED NG/ML
GFR SERPL CREATININE-BSD FRML MDRD: 81 ML/MIN/1.7M2
GLUCOSE SERPL-MCNC: 109 MG/DL (ref 70–105)
HDLC SERPL-MCNC: 46 MG/DL (ref 23–92)
LDLC SERPL CALC-MCNC: ABNORMAL MG/DL
METHADONE UR QL SCN: NOT DETECTED
NONHDLC SERPL-MCNC: 188 MG/DL
OPIATES UR QL SCN: NOT DETECTED
OXYCODONE UR QL: NOT DETECTED NG/ML
PCP UR QL SCN: NOT DETECTED
POTASSIUM SERPL-SCNC: 3.8 MMOL/L (ref 3.5–5.1)
PROPOXYPH UR QL: NOT DETECTED NG/ML
PROT SERPL-MCNC: 7.2 G/DL (ref 6.4–8.9)
SODIUM SERPL-SCNC: 137 MMOL/L (ref 134–144)
TRICYCLICS UR QL SCN: ABNORMAL NG/ML
TRIGL SERPL-MCNC: 668 MG/DL

## 2018-11-21 PROCEDURE — 99214 OFFICE O/P EST MOD 30 MIN: CPT | Performed by: INTERNAL MEDICINE

## 2018-11-21 PROCEDURE — G0463 HOSPITAL OUTPT CLINIC VISIT: HCPCS | Mod: 25

## 2018-11-21 PROCEDURE — 36415 COLL VENOUS BLD VENIPUNCTURE: CPT | Performed by: INTERNAL MEDICINE

## 2018-11-21 PROCEDURE — G0463 HOSPITAL OUTPT CLINIC VISIT: HCPCS

## 2018-11-21 PROCEDURE — 80061 LIPID PANEL: CPT | Performed by: INTERNAL MEDICINE

## 2018-11-21 PROCEDURE — 80307 DRUG TEST PRSMV CHEM ANLYZR: CPT | Performed by: INTERNAL MEDICINE

## 2018-11-21 PROCEDURE — G0008 ADMIN INFLUENZA VIRUS VAC: HCPCS

## 2018-11-21 PROCEDURE — 90471 IMMUNIZATION ADMIN: CPT

## 2018-11-21 PROCEDURE — 90686 IIV4 VACC NO PRSV 0.5 ML IM: CPT | Performed by: INTERNAL MEDICINE

## 2018-11-21 PROCEDURE — 80053 COMPREHEN METABOLIC PANEL: CPT | Performed by: INTERNAL MEDICINE

## 2018-11-21 RX ORDER — DEXTROAMPHETAMINE SACCHARATE, AMPHETAMINE ASPARTATE MONOHYDRATE, DEXTROAMPHETAMINE SULFATE AND AMPHETAMINE SULFATE 7.5; 7.5; 7.5; 7.5 MG/1; MG/1; MG/1; MG/1
30 CAPSULE, EXTENDED RELEASE ORAL DAILY
Qty: 30 CAPSULE | Refills: 0 | Status: SHIPPED | OUTPATIENT
Start: 2019-01-16 | End: 2019-03-01

## 2018-11-21 RX ORDER — DEXTROAMPHETAMINE SACCHARATE, AMPHETAMINE ASPARTATE MONOHYDRATE, DEXTROAMPHETAMINE SULFATE AND AMPHETAMINE SULFATE 7.5; 7.5; 7.5; 7.5 MG/1; MG/1; MG/1; MG/1
30 CAPSULE, EXTENDED RELEASE ORAL DAILY
Qty: 30 CAPSULE | Refills: 0 | Status: SHIPPED | OUTPATIENT
Start: 2018-12-19 | End: 2019-03-01

## 2018-11-21 RX ORDER — DEXTROAMPHETAMINE SACCHARATE, AMPHETAMINE ASPARTATE MONOHYDRATE, DEXTROAMPHETAMINE SULFATE AND AMPHETAMINE SULFATE 7.5; 7.5; 7.5; 7.5 MG/1; MG/1; MG/1; MG/1
30 CAPSULE, EXTENDED RELEASE ORAL DAILY
Qty: 30 CAPSULE | Refills: 0 | Status: SHIPPED | OUTPATIENT
Start: 2018-11-21 | End: 2019-03-01

## 2018-11-21 ASSESSMENT — PATIENT HEALTH QUESTIONNAIRE - PHQ9
5. POOR APPETITE OR OVEREATING: NOT AT ALL
SUM OF ALL RESPONSES TO PHQ QUESTIONS 1-9: 0

## 2018-11-21 ASSESSMENT — ENCOUNTER SYMPTOMS
BRUISES/BLEEDS EASILY: 0
MYALGIAS: 0
COUGH: 0
LIGHT-HEADEDNESS: 0
AGITATION: 0
ARTHRALGIAS: 0
CONFUSION: 0
HEMATURIA: 0
FEVER: 0
FATIGUE: 0
DYSURIA: 0
VOMITING: 0
CHILLS: 0
PALPITATIONS: 0
SHORTNESS OF BREATH: 0
DIARRHEA: 0
DIZZINESS: 0
NAUSEA: 0
EYE PAIN: 0
WHEEZING: 0
ABDOMINAL PAIN: 0

## 2018-11-21 ASSESSMENT — PAIN SCALES - GENERAL: PAINLEVEL: NO PAIN (0)

## 2018-11-21 ASSESSMENT — ANXIETY QUESTIONNAIRES
5. BEING SO RESTLESS THAT IT IS HARD TO SIT STILL: NOT AT ALL
IF YOU CHECKED OFF ANY PROBLEMS ON THIS QUESTIONNAIRE, HOW DIFFICULT HAVE THESE PROBLEMS MADE IT FOR YOU TO DO YOUR WORK, TAKE CARE OF THINGS AT HOME, OR GET ALONG WITH OTHER PEOPLE: NOT DIFFICULT AT ALL
7. FEELING AFRAID AS IF SOMETHING AWFUL MIGHT HAPPEN: NOT AT ALL
GAD7 TOTAL SCORE: 0
3. WORRYING TOO MUCH ABOUT DIFFERENT THINGS: NOT AT ALL
1. FEELING NERVOUS, ANXIOUS, OR ON EDGE: NOT AT ALL
2. NOT BEING ABLE TO STOP OR CONTROL WORRYING: NOT AT ALL
6. BECOMING EASILY ANNOYED OR IRRITABLE: NOT AT ALL

## 2018-11-21 NOTE — PATIENT INSTRUCTIONS
Labs today.   Medications refilled.     Return in approximately 3 month(s), or sooner as needed for follow-up with Dr. Salcido.  - Med Refills    Clinic : [phone]  Appointment line: [phone]

## 2018-11-21 NOTE — PROGRESS NOTES
"Nursing Notes:   Mariela Puentes LPN  11/21/2018  8:16 AM  Signed  Patient presents to the clinic for medication management, last administration of Adderall was around 0630 today.  Contact up dated today.      Chief Complaint   Patient presents with     Recheck Medication       Initial There were no vitals taken for this visit. Estimated body mass index is 29.13 kg/(m^2) as calculated from the following:    Height as of 3/10/18: 5' 10\" (1.778 m).    Weight as of 8/22/18: 203 lb (92.1 kg).  Medication Reconciliation: complete    Mariela Puentes LPN            Nursing note reviewed with patient.  Accurracy and completeness verified.   Mr. Hess is a 38 year old male who:  Patient presents with:  Recheck Medication      ICD-10-CM    1. Attention deficit hyperactivity disorder (ADHD), combined type F90.2 amphetamine-dextroamphetamine (ADDERALL XR) 30 MG 24 hr capsule     amphetamine-dextroamphetamine (ADDERALL XR) 30 MG 24 hr capsule     amphetamine-dextroamphetamine (ADDERALL XR) 30 MG 24 hr capsule   2. Need for prophylactic vaccination and inoculation against influenza Z23 HC FLU VAC PRESRV FREE QUAD SPLIT VIR 3+YRS IM   3. Controlled substance agreement signed 1-3-18 Z79.899 Drug of Abuse Screen Urine GH     amphetamine-dextroamphetamine (ADDERALL XR) 30 MG 24 hr capsule     amphetamine-dextroamphetamine (ADDERALL XR) 30 MG 24 hr capsule     amphetamine-dextroamphetamine (ADDERALL XR) 30 MG 24 hr capsule   4. Elevated liver enzymes R74.8    5. Familial hypercholesterolemia - at least heterozygote E78.01      HPI  Patient presents for medication follow-up.  Her graph ADHD, reports job is been much better since using his Adderall.  He uses regularly.  No side effects reported.  Urine drug screening due today.  [facility] website reviewed.  No abnormal findings noted.  Proper medication use misuse reviewed.  Patient has been using medications appropriately.  He is also using lorazepam intermittently.  Prescriptions " refilled times 3 months.    Flu shot today.    Urine drug screen today.    Elevated liver enzymes, improved with last lab work, cholesterol levels were also improved.    Familial hyperlipidemia, at least heterozygous.  LDL cholesterol levels are very high previous.  He is currently on Lipitor 80 mg daily.  He is also taking Lovaza 1 g 4 times daily.  He is fasting and would like labs rechecked today.    Functional Capacity: > 4 METS.   Reports that he can climb a flight of stairs without any chest pain/heaviness or shortness of breath.   No orthopnea/paroxysmal nocturnal dyspnea  Review of Systems   Constitutional: Negative for chills, fatigue and fever.   HENT: Negative for congestion and hearing loss.    Eyes: Negative for pain and visual disturbance.   Respiratory: Negative for cough, shortness of breath and wheezing.    Cardiovascular: Negative for chest pain and palpitations.   Gastrointestinal: Negative for abdominal pain, diarrhea, nausea and vomiting.   Endocrine: Negative for cold intolerance and heat intolerance.   Genitourinary: Negative for dysuria and hematuria.   Musculoskeletal: Negative for arthralgias and myalgias.   Skin: Negative for pallor.   Allergic/Immunologic: Negative for immunocompromised state.   Neurological: Negative for dizziness and light-headedness.   Hematological: Does not bruise/bleed easily.   Psychiatric/Behavioral: Negative for agitation and confusion.      ANNMARIE:   ANNMARIE-7 SCORE 4/18/2018 8/22/2018 11/21/2018   Total Score 0 0 0     PHQ9:  PHQ-9 SCORE 4/18/2018 8/22/2018 11/21/2018   Total Score 0 0 0       I have personally reviewed the past medical history, past surgical history, medications, allergies, family and social history as listed below, on 11/21/2018.    Allergies   Allergen Reactions     Cats      Other reaction(s): Runny Nose  Itchy eyes and hand swelling     Food      Other reaction(s): Angioedema  Raw Parsnip AND Raw Carrots     Lisinopril Cough       Current  Outpatient Prescriptions   Medication Sig Dispense Refill     amitriptyline (ELAVIL) 25 MG tablet Take 2 tablets (50 mg) by mouth At Bedtime 180 tablet 3     amLODIPine (NORVASC) 5 MG tablet Take 1 tablet (5 mg) by mouth daily 90 tablet 3     amphetamine-dextroamphetamine (ADDERALL XR) 30 MG 24 hr capsule Take 1 capsule (30 mg) by mouth daily 30 capsule 0     [START ON 12/19/2018] amphetamine-dextroamphetamine (ADDERALL XR) 30 MG 24 hr capsule Take 1 capsule (30 mg) by mouth daily 30 capsule 0     [START ON 1/16/2019] amphetamine-dextroamphetamine (ADDERALL XR) 30 MG 24 hr capsule Take 1 capsule (30 mg) by mouth daily 30 capsule 0     atorvastatin (LIPITOR) 80 MG tablet Take 1 tablet (80 mg) by mouth daily 90 tablet 3     budesonide (RINOCORT AQUA) 32 MCG/ACT spray Spray 1 spray into both nostrils daily       escitalopram (LEXAPRO) 10 MG tablet Take 1 tablet (10 mg) by mouth daily 90 tablet 3     LORazepam (ATIVAN) 0.5 MG tablet Take 1 tablet (0.5 mg) by mouth 2 times daily as needed 60 tablet 5     losartan (COZAAR) 50 MG tablet Take 1 tablet (50 mg) by mouth daily 90 tablet 3     mesalamine (DELZICOL) 400 MG CR capsule Take 2 capsules (800 mg) by mouth 4 times daily as needed 240 capsule 11     methylPREDNISolone (MEDROL) 4 MG tablet TAKE AS DIRECTED FOR 10 DAYS FOR ULCERATIVE COLITIS FLAIR. REPEAT EVERY 4 WEEKS IF NEEDED. MAXIMUM OF 40 TABLETS MONTHLY 40 tablet 11     omega-3 acid ethyl esters (LOVAZA) 1 g capsule Take 1 capsule (1 g) by mouth 4 times daily 360 capsule 3     omeprazole (PRILOSEC) 20 MG CR capsule Take 1 capsule (20 mg) by mouth daily 30-60 minutes prior to 1st meal of the day - limit use as able 90 capsule 3     scopolamine (TRANSDERM) 72 hr patch Place 1 patch onto the skin every 72 hours on dry, clean, hairless skin every 72 hours. For Motion Sickness 10 patch 11     SUMAtriptan (IMITREX) 50 MG tablet Take 1 tablet (50 mg) by mouth every 2 hours as needed for migraine Max Dose 4 tablets per 24  hrs. 6 tablet 11        Patient Active Problem List    Diagnosis Date Noted     Familial hypercholesterolemia - at least heterozygote 04/18/2018     Priority: Medium     Major depression in complete remission (H) 04/04/2018     Priority: Medium     Anxiety 12/01/2015     Priority: Medium     Attention deficit hyperactivity disorder (ADHD), combined type 12/01/2015     Priority: Medium     Controlled substance agreement signed 1-3-18 12/01/2015     Priority: Medium     Migraine without aura and without status migrainosus, not intractable 06/19/2015     Priority: Medium     Benign essential hypertension 08/13/2014     Priority: Medium     Motion sickness 08/13/2014     Priority: Medium     Sprain of left thumb 08/13/2014     Priority: Medium     Social phobia 12/19/2013     Priority: Medium     Allergic rhinitis due to cat hair 12/10/2013     Priority: Medium     Dust allergy 12/10/2013     Priority: Medium     Elevated liver enzymes 12/10/2013     Priority: Medium     GERD (gastroesophageal reflux disease) 12/10/2013     Priority: Medium     Hair loss 12/10/2013     Priority: Medium     Lateral epicondylitis 12/10/2013     Priority: Medium     Medial epicondylitis of elbow 12/10/2013     Priority: Medium     Seasonal allergic rhinitis 12/10/2013     Priority: Medium     Sleeping difficulties 12/10/2013     Priority: Medium     Inflammatory bowel disease -- not definitive of UC vs chrons based on blood work in 2006 08/09/2007     Priority: Medium     Past Medical History:   Diagnosis Date     Allergic rhinitis due to animal hair and dander     12/10/2013     Gastro-esophageal reflux disease without esophagitis     12/10/2013     Major depressive disorder, single episode     12/10/2013,Previously followed with Psychiatry - was on Remeron, Adderall, Lexapro in the past. Awaiting to re-establish psychiatry care again.     Migraine without status migrainosus, not intractable     12/10/2013     Other allergic rhinitis      12/10/2013     Other seasonal allergic rhinitis     12/10/2013     Sleep disorder     12/10/2013     Ulcerative colitis without complications (H)     8/9/2007     Past Surgical History:   Procedure Laterality Date     APPENDECTOMY OPEN      2/4/13,Dr. 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     Recently was doing landscaping and carpentry work     - minimal work as of February 2014, for past 1 month.    Moved back to [facility] in about November 2012.     For family mental health issues, he notes that an aunt committed suicide in    October 2013 and 3 maternal aunts and his mother are on antidepressants. His    maternal grandmother was diagnosed with schizoaffective disorder, as was a    male cousin. Attention deficit hyperactivity disorder was significant for an    uncle and the client's cousins.     Family History   Problem Relation Age of Onset     Colon Cancer Paternal Grandmother      Cancer-colon     Colon Cancer Maternal Grandmother      Cancer-colon     Other - See Comments Other      Psychiatric illness,Aunt - suicide fall 2013       EXAM:   Vitals:    11/21/18 0809   BP: 130/76   BP Location: Right arm   Patient Position: Sitting   Cuff Size: Adult Regular   Pulse: 72   Weight: 200 lb (90.7 kg)       Current Pain Score: No Pain (0)     BP Readings from Last 3 Encounters:   11/21/18 130/76   08/22/18 138/82   04/18/18 118/74      Wt Readings from Last 3 Encounters:   11/21/18 200 lb (90.7 kg)   08/22/18 203 lb (92.1  "kg)   04/18/18 200 lb (90.7 kg)      Estimated body mass index is 28.7 kg/(m^2) as calculated from the following:    Height as of 3/10/18: 5' 10\" (1.778 m).    Weight as of this encounter: 200 lb (90.7 kg).     Physical Exam   Constitutional: He appears well-developed and well-nourished. No distress.   HENT:   Head: Normocephalic and atraumatic.   Eyes: Conjunctivae and EOM are normal. No scleral icterus.   Neck: No thyromegaly present.   Cardiovascular: Normal rate and regular rhythm.    Pulmonary/Chest: Effort normal. No respiratory distress. He has no wheezes.   Abdominal: Soft. There is no tenderness.   Musculoskeletal: He exhibits no tenderness or deformity.   Lymphadenopathy:     He has no cervical adenopathy.   Neurological: He is alert. No cranial nerve deficit.   Skin: Skin is warm and dry.   Psychiatric: He has a normal mood and affect.      Procedures   INVESTIGATIONS:  Results for orders placed or performed in visit on 08/22/18   PSA Screen GH   Result Value Ref Range    PSA Screen 0.438 <3.100 ng/mL   Comprehensive metabolic panel   Result Value Ref Range    Sodium 138 134 - 144 mmol/L    Potassium 4.0 3.5 - 5.1 mmol/L    Chloride 103 98 - 107 mmol/L    Carbon Dioxide 22 21 - 31 mmol/L    Anion Gap 13 3 - 14 mmol/L    Glucose 139 (H) 70 - 105 mg/dL    Urea Nitrogen 12 7 - 25 mg/dL    Creatinine 0.88 0.70 - 1.30 mg/dL    GFR Estimate >90 >60 mL/min/1.7m2    GFR Estimate If Black >90 >60 mL/min/1.7m2    Calcium 10.2 8.6 - 10.3 mg/dL    Bilirubin Total 0.4 0.3 - 1.0 mg/dL    Albumin 4.9 3.5 - 5.7 g/dL    Protein Total 7.6 6.4 - 8.9 g/dL    Alkaline Phosphatase 82 34 - 104 U/L    ALT 68 (H) 7 - 52 U/L    AST 38 13 - 39 U/L   Lipid Profile   Result Value Ref Range    Cholesterol 256 (H) <200 mg/dL    Triglycerides 661 (H) <150 mg/dL    HDL Cholesterol 44 23 - 92 mg/dL    LDL Cholesterol Calculated  <100 mg/dL     Cannot estimate LDL when triglyceride exceeds 400 mg/dL    Non HDL Cholesterol 212 (H) <130 " mg/dL       ASSESSMENT AND PLAN:  Problem List Items Addressed This Visit        Nervous and Auditory    Controlled substance agreement signed 1-3-18    Relevant Medications    amphetamine-dextroamphetamine (ADDERALL XR) 30 MG 24 hr capsule    amphetamine-dextroamphetamine (ADDERALL XR) 30 MG 24 hr capsule (Start on 12/19/2018)    amphetamine-dextroamphetamine (ADDERALL XR) 30 MG 24 hr capsule (Start on 1/16/2019)    Other Relevant Orders    Drug of Abuse Screen Urine GH       Endocrine    Familial hypercholesterolemia - at least heterozygote       Behavioral    Attention deficit hyperactivity disorder (ADHD), combined type - Primary    Relevant Medications    amphetamine-dextroamphetamine (ADDERALL XR) 30 MG 24 hr capsule    amphetamine-dextroamphetamine (ADDERALL XR) 30 MG 24 hr capsule (Start on 12/19/2018)    amphetamine-dextroamphetamine (ADDERALL XR) 30 MG 24 hr capsule (Start on 1/16/2019)       Other    Elevated liver enzymes      Other Visit Diagnoses     Need for prophylactic vaccination and inoculation against influenza        Relevant Orders    HC FLU VAC PRESRV FREE QUAD SPLIT VIR 3+YRS IM (Completed)        reviewed diet, exercise and weight control  -- Expected clinical course discussed    -- Medications and their side effects discussed    Patient Instructions   Labs today.   Medications refilled.     Return in approximately 3 month(s), or sooner as needed for follow-up with Dr. Salcido.  - Med Refills    Clinic : [phone]  Appointment line: [phone]      Jarad Salcido MD  Internal Medicine  [facility]     Portions of this note were dictated using speech recognition software. The note has been proofread but errors in the text may have been overlooked. Please contact me if there are any concerns regarding the accuracy of the dictation.

## 2018-11-21 NOTE — NURSING NOTE
"Patient presents to the clinic for medication management, last administration of Adderall was around 0630 today.  Contact up dated today.      Chief Complaint   Patient presents with     Recheck Medication       Initial There were no vitals taken for this visit. Estimated body mass index is 29.13 kg/(m^2) as calculated from the following:    Height as of 3/10/18: 5' 10\" (1.778 m).    Weight as of 8/22/18: 203 lb (92.1 kg).  Medication Reconciliation: complete    Mariela Puentes LPN            "

## 2018-11-21 NOTE — PROGRESS NOTES

## 2018-11-21 NOTE — LETTER
November 21, 2018    Kike Hess  [address] [place] [address]      Dear Kike Hess,    The result of your recent tests are included below:    Cholesterol levels have not changed significantly from previous.    Results for orders placed or performed in visit on 11/21/18   Comprehensive metabolic panel   Result Value Ref Range    Sodium 137 134 - 144 mmol/L    Potassium 3.8 3.5 - 5.1 mmol/L    Chloride 102 98 - 107 mmol/L    Carbon Dioxide 24 21 - 31 mmol/L    Anion Gap 11 3 - 14 mmol/L    Glucose 109 (H) 70 - 105 mg/dL    Urea Nitrogen 22 7 - 25 mg/dL    Creatinine 1.02 0.70 - 1.30 mg/dL    GFR Estimate 81 >60 mL/min/1.7m2    GFR Estimate If Black >90 >60 mL/min/1.7m2    Calcium 10.1 8.6 - 10.3 mg/dL    Bilirubin Total 0.4 0.3 - 1.0 mg/dL    Albumin 4.7 3.5 - 5.7 g/dL    Protein Total 7.2 6.4 - 8.9 g/dL    Alkaline Phosphatase 79 34 - 104 U/L     (H) 7 - 52 U/L    AST 93 (H) 13 - 39 U/L   Lipid Profile   Result Value Ref Range    Cholesterol 234 (H) <200 mg/dL    Triglycerides 668 (H) <150 mg/dL    HDL Cholesterol 46 23 - 92 mg/dL    LDL Cholesterol Calculated  <100 mg/dL     Cannot estimate LDL when triglyceride exceeds 400 mg/dL    Non HDL Cholesterol 188 (H) <130 mg/dL   Drug of Abuse Screen Urine GH   Result Value Ref Range    Amphetamine Qual Urine Presumptive positive-Unconfirmed result (A) NDET^Not Detected    Benzodiazepine Qual Urine Not Detected NDET^Not Detected    Cocaine Qual Urine Not Detected NDET^Not Detected    Methadone Qual Urine Not Detected NDET^Not Detected    PCP Qual Urine Not Detected NDET^Not Detected    Opiates Qualitative Urine Not Detected NDET^Not Detected    Oxycodone Qualitative Urine Not Detected NDET^Not Detected ng/mL    Propoxyphene Qualitative Urine Not Detected NDET^Not Detected ng/mL    Tricyclic Antidepressants Qual Urine Presumptive positive-Unconfirmed result (A) NDET^Not Detected ng/mL    Methamphetamine Qualitative Urine Not  Detected NDET^Not Detected ng/mL    Barbiturates Qual Urine Not Detected NDET^Not Detected    Cannabinoids Qualitative Urine Not Detected NDET^Not Detected ng/mL    Buprenorphine Qualitative Urine Not Detected NDET^Not Detected ng/mL       If you have any further questions or problems, please contact my office at [phone] and schedule an appointment.    Clinic : [phone]  Appointment line: [phone]     Thank you,    Jarad Salcido MD    Internal Medicine  [facility]     Reviewed and electronically signed by provider.

## 2018-11-21 NOTE — MR AVS SNAPSHOT
After Visit Summary   11/21/2018    Kike Hess    MRN: [id number]           Patient Information     Date Of Birth          1980        Visit Information        Provider Department      11/21/2018 8:00 AM Jarad Salcido MD [facility]        Today's Diagnoses     Attention deficit hyperactivity disorder (ADHD), combined type    -  1    Need for prophylactic vaccination and inoculation against influenza        Controlled substance agreement signed 1-3-18        Elevated liver enzymes        Familial hypercholesterolemia - at least heterozygote          Care Instructions    Labs today.   Medications refilled.     Return in approximately 3 month(s), or sooner as needed for follow-up with Dr. Salcido.  - Med Refills    Clinic : [phone]  Appointment line: [phone]            Follow-ups after your visit        Follow-up notes from your care team     Return in about 3 months (around 2/21/2019) for - Med Refills.      Future tests that were ordered for you today     Open Future Orders        Priority Expected Expires Ordered    Drug of Abuse Screen Urine GH Routine  11/22/2019 11/21/2018            Who to contact     If you have questions or need follow up information about today's clinic visit or your schedule please contact [facility] AND \[facility]\"" directly at [phone].  Normal or non-critical lab and imaging results will be communicated to you by MyChart, letter or phone within 4 business days after the clinic has received the results. If you do not hear from us within 7 days, please contact the clinic through MyChart or phone. If you have a critical or abnormal lab result, we will notify you by phone as soon as possible.  Submit refill requests through [employer] or call your pharmacy and they will forward the refill request to us. Please allow 3 business days for your refill to be completed.          Additional Information About Your Visit         Care EveryWhere ID     This is your Care EveryWhere ID. This could be used by other organizations to access your [place] medical records  [id number]        Your Vitals Were     Pulse BMI (Body Mass Index)                72 28.7 kg/m2           Blood Pressure from Last 3 Encounters:   11/21/18 130/76   08/22/18 138/82   04/18/18 118/74    Weight from Last 3 Encounters:   11/21/18 200 lb (90.7 kg)   08/22/18 203 lb (92.1 kg)   04/18/18 200 lb (90.7 kg)              We Performed the Following     HC FLU VAC PRESRV FREE QUAD SPLIT VIR 3+YRS IM          Today's Medication Changes          These changes are accurate as of 11/21/18  8:26 AM.  If you have any questions, ask your nurse or doctor.               Start taking these medicines.        Dose/Directions    * amphetamine-dextroamphetamine 30 MG 24 hr capsule   Commonly known as:  ADDERALL XR   Used for:  Attention deficit hyperactivity disorder (ADHD), combined type, Controlled substance agreement signed   Started by:  Jarad Salcido MD        Dose:  30 mg   Take 1 capsule (30 mg) by mouth daily   Quantity:  30 capsule   Refills:  0       * amphetamine-dextroamphetamine 30 MG 24 hr capsule   Commonly known as:  ADDERALL XR   Used for:  Attention deficit hyperactivity disorder (ADHD), combined type, Controlled substance agreement signed   Started by:  Jarad Salcido MD        Dose:  30 mg   Start taking on:  12/19/2018   Take 1 capsule (30 mg) by mouth daily   Quantity:  30 capsule   Refills:  0       * amphetamine-dextroamphetamine 30 MG 24 hr capsule   Commonly known as:  ADDERALL XR   Used for:  Attention deficit hyperactivity disorder (ADHD), combined type, Controlled substance agreement signed   Started by:  Jarad Salcido MD        Dose:  30 mg   Start taking on:  1/16/2019   Take 1 capsule (30 mg) by mouth daily   Quantity:  30 capsule   Refills:  0       * Notice:  This list has 3 medication(s) that are the same as other medications prescribed for you.  Read the directions carefully, and ask your doctor or other care provider to review them with you.         Where to get your medicines      Some of these will need a paper prescription and others can be bought over the counter.  Ask your nurse if you have questions.     Bring a paper prescription for each of these medications     amphetamine-dextroamphetamine 30 MG 24 hr capsule    amphetamine-dextroamphetamine 30 MG 24 hr capsule    amphetamine-dextroamphetamine 30 MG 24 hr capsule                Primary Care Provider Office Phone # Fax #    Jarad Salcido [phone] [address] [facility] [address]        Equal Access to Services     [facility]: Hadii aad ku hadasho Soomaali, waaxda luqadaha, qaybta kaalmada kim, pawel chun . So [facility] [phone].    ATENCIÓN: Si habla español, tiene a raymundo disposición servicios gratuitos de asistencia lingüística. [facility] [phone].    We comply with applicable federal civil rights laws and [place] laws. We do not discriminate on the basis of race, color, national origin, age, disability, sex, sexual orientation, or gender identity.            Thank you!     Thank you for choosing [facility] AND [facility]  for your care. Our goal is always to provide you with excellent care. Hearing back from our patients is one way we can continue to improve our services. Please take a few minutes to complete the written survey that you may receive in the mail after your visit with us. Thank you!             Your Updated Medication List - Protect others around you: Learn how to safely use, store and throw away your medicines at www.disposemymeds.org.          This list is accurate as of 11/21/18  8:26 AM.  Always use your most recent med list.                   Brand Name Dispense Instructions for use Diagnosis    amitriptyline 25 MG tablet    ELAVIL    180 tablet    Take 2 tablets (50 mg) by mouth At Bedtime     Major depression in complete remission (H)       amLODIPine 5 MG tablet    NORVASC    90 tablet    Take 1 tablet (5 mg) by mouth daily    Benign essential hypertension       * amphetamine-dextroamphetamine 30 MG 24 hr capsule    ADDERALL XR    30 capsule    Take 1 capsule (30 mg) by mouth daily    Attention deficit hyperactivity disorder (ADHD), combined type, Controlled substance agreement signed       * amphetamine-dextroamphetamine 30 MG 24 hr capsule   Start taking on:  12/19/2018    ADDERALL XR    30 capsule    Take 1 capsule (30 mg) by mouth daily    Attention deficit hyperactivity disorder (ADHD), combined type, Controlled substance agreement signed       * amphetamine-dextroamphetamine 30 MG 24 hr capsule   Start taking on:  1/16/2019    ADDERALL XR    30 capsule    Take 1 capsule (30 mg) by mouth daily    Attention deficit hyperactivity disorder (ADHD), combined type, Controlled substance agreement signed       atorvastatin 80 MG tablet    LIPITOR    90 tablet    Take 1 tablet (80 mg) by mouth daily    Essential familial hypercholesterolemia       budesonide 32 MCG/ACT spray    RINOCORT AQUA     Spray 1 spray into both nostrils daily        escitalopram 10 MG tablet    LEXAPRO    90 tablet    Take 1 tablet (10 mg) by mouth daily    Major depression in complete remission (H)       LORazepam 0.5 MG tablet    ATIVAN    60 tablet    Take 1 tablet (0.5 mg) by mouth 2 times daily as needed    Attention deficit hyperactivity disorder (ADHD), combined type       losartan 50 MG tablet    COZAAR    90 tablet    Take 1 tablet (50 mg) by mouth daily    Benign essential hypertension       mesalamine 400 MG CR capsule    DELZICOL    240 capsule    Take 2 capsules (800 mg) by mouth 4 times daily as needed    Inflammatory bowel disease       methylPREDNISolone 4 MG tablet    MEDROL    40 tablet    TAKE AS DIRECTED FOR 10 DAYS FOR ULCERATIVE COLITIS FLAIR. REPEAT EVERY 4 WEEKS IF NEEDED. MAXIMUM OF 40 TABLETS MONTHLY     Inflammatory bowel disease       omega-3 acid ethyl esters 1 g capsule    Lovaza    360 capsule    Take 1 capsule (1 g) by mouth 4 times daily    Familial hypercholesterolemia       omeprazole 20 MG CR capsule    priLOSEC    90 capsule    Take 1 capsule (20 mg) by mouth daily 30-60 minutes prior to 1st meal of the day - limit use as able    Heartburn       scopolamine 72 hr patch    TRANSDERM    10 patch    Place 1 patch onto the skin every 72 hours on dry, clean, hairless skin every 72 hours. For Motion Sickness    Motion sickness, subsequent encounter       SUMAtriptan 50 MG tablet    IMITREX    6 tablet    Take 1 tablet (50 mg) by mouth every 2 hours as needed for migraine Max Dose 4 tablets per 24 hrs.    Migraine without aura and without status migrainosus, not intractable       * Notice:  This list has 3 medication(s) that are the same as other medications prescribed for you. Read the directions carefully, and ask your doctor or other care provider to review them with you.

## 2018-11-22 ASSESSMENT — ANXIETY QUESTIONNAIRES: GAD7 TOTAL SCORE: 0

## 2019-01-28 DIAGNOSIS — J30.81 ALLERGIC RHINITIS DUE TO CAT HAIR: Primary | ICD-10-CM

## 2019-01-28 DIAGNOSIS — I10 BENIGN ESSENTIAL HYPERTENSION: ICD-10-CM

## 2019-01-29 RX ORDER — LOSARTAN POTASSIUM 50 MG/1
50 TABLET ORAL DAILY
Qty: 90 TABLET | Refills: 3 | OUTPATIENT
Start: 2019-01-29

## 2019-01-29 RX ORDER — AMLODIPINE BESYLATE 5 MG/1
5 TABLET ORAL DAILY
Qty: 90 TABLET | Refills: 3 | OUTPATIENT
Start: 2019-01-29

## 2019-01-29 NOTE — TELEPHONE ENCOUNTER
Amlodipine refilled on 4/4/18 #90 x 3 refills  Losartan refilled on 4/4/18 #90 x 3 refills to [facility].    Called pharmacy and they just refilled above medications and patient is good until end of March but will be out of Rhinocort on 1/30/19.    Routing refill request to provider for review/approval because:  Drug not on the [facility] refill protocol       LOV; 11/21/18    Grecia Harris RN on 1/29/2019 at 2:45 PM

## 2019-03-01 ENCOUNTER — OFFICE VISIT (OUTPATIENT)
Dept: INTERNAL MEDICINE | Facility: OTHER | Age: 39
End: 2019-03-01
Attending: INTERNAL MEDICINE
Payer: COMMERCIAL

## 2019-03-01 ENCOUNTER — TELEPHONE (OUTPATIENT)
Dept: INTERNAL MEDICINE | Facility: OTHER | Age: 39
End: 2019-03-01

## 2019-03-01 VITALS
DIASTOLIC BLOOD PRESSURE: 74 MMHG | HEART RATE: 80 BPM | TEMPERATURE: 97.2 F | WEIGHT: 205 LBS | SYSTOLIC BLOOD PRESSURE: 128 MMHG | BODY MASS INDEX: 28.7 KG/M2 | HEIGHT: 71 IN | RESPIRATION RATE: 18 BRPM

## 2019-03-01 DIAGNOSIS — F32.5 MAJOR DEPRESSION IN COMPLETE REMISSION (H): ICD-10-CM

## 2019-03-01 DIAGNOSIS — T75.3XXD MOTION SICKNESS, SUBSEQUENT ENCOUNTER: ICD-10-CM

## 2019-03-01 DIAGNOSIS — F90.2 ATTENTION DEFICIT HYPERACTIVITY DISORDER (ADHD), COMBINED TYPE: ICD-10-CM

## 2019-03-01 DIAGNOSIS — R12 HEARTBURN: ICD-10-CM

## 2019-03-01 DIAGNOSIS — Z79.899 CONTROLLED SUBSTANCE AGREEMENT SIGNED: ICD-10-CM

## 2019-03-01 DIAGNOSIS — K52.9 INFLAMMATORY BOWEL DISEASE: ICD-10-CM

## 2019-03-01 DIAGNOSIS — E78.01 ESSENTIAL FAMILIAL HYPERCHOLESTEROLEMIA: ICD-10-CM

## 2019-03-01 DIAGNOSIS — I10 BENIGN ESSENTIAL HYPERTENSION: ICD-10-CM

## 2019-03-01 PROCEDURE — 99214 OFFICE O/P EST MOD 30 MIN: CPT | Performed by: INTERNAL MEDICINE

## 2019-03-01 PROCEDURE — G0463 HOSPITAL OUTPT CLINIC VISIT: HCPCS

## 2019-03-01 RX ORDER — SCOLOPAMINE TRANSDERMAL SYSTEM 1 MG/1
1 PATCH, EXTENDED RELEASE TRANSDERMAL
Qty: 10 PATCH | Refills: 11 | Status: SHIPPED | OUTPATIENT
Start: 2019-03-01 | End: 2020-07-21

## 2019-03-01 RX ORDER — LORAZEPAM 0.5 MG/1
0.5 TABLET ORAL 2 TIMES DAILY PRN
Qty: 60 TABLET | Refills: 5 | Status: SHIPPED | OUTPATIENT
Start: 2019-03-01 | End: 2019-05-24

## 2019-03-01 RX ORDER — ATORVASTATIN CALCIUM 80 MG/1
80 TABLET, FILM COATED ORAL DAILY
Qty: 90 TABLET | Refills: 3 | Status: SHIPPED | OUTPATIENT
Start: 2019-03-01 | End: 2020-03-31

## 2019-03-01 RX ORDER — DEXTROAMPHETAMINE SACCHARATE, AMPHETAMINE ASPARTATE MONOHYDRATE, DEXTROAMPHETAMINE SULFATE AND AMPHETAMINE SULFATE 7.5; 7.5; 7.5; 7.5 MG/1; MG/1; MG/1; MG/1
30 CAPSULE, EXTENDED RELEASE ORAL DAILY
Qty: 30 CAPSULE | Refills: 0 | Status: SHIPPED | OUTPATIENT
Start: 2019-04-26 | End: 2019-05-24

## 2019-03-01 RX ORDER — DEXTROAMPHETAMINE SACCHARATE, AMPHETAMINE ASPARTATE MONOHYDRATE, DEXTROAMPHETAMINE SULFATE AND AMPHETAMINE SULFATE 7.5; 7.5; 7.5; 7.5 MG/1; MG/1; MG/1; MG/1
30 CAPSULE, EXTENDED RELEASE ORAL DAILY
Qty: 30 CAPSULE | Refills: 0 | Status: SHIPPED | OUTPATIENT
Start: 2019-03-29 | End: 2019-05-24

## 2019-03-01 RX ORDER — DEXTROAMPHETAMINE SACCHARATE, AMPHETAMINE ASPARTATE MONOHYDRATE, DEXTROAMPHETAMINE SULFATE AND AMPHETAMINE SULFATE 7.5; 7.5; 7.5; 7.5 MG/1; MG/1; MG/1; MG/1
30 CAPSULE, EXTENDED RELEASE ORAL DAILY
Qty: 30 CAPSULE | Refills: 0 | Status: SHIPPED | OUTPATIENT
Start: 2019-03-01 | End: 2019-05-24

## 2019-03-01 RX ORDER — DEXTROAMPHETAMINE SACCHARATE, AMPHETAMINE ASPARTATE MONOHYDRATE, DEXTROAMPHETAMINE SULFATE AND AMPHETAMINE SULFATE 7.5; 7.5; 7.5; 7.5 MG/1; MG/1; MG/1; MG/1
CAPSULE, EXTENDED RELEASE ORAL
Qty: 30 CAPSULE | OUTPATIENT
Start: 2019-03-01

## 2019-03-01 RX ORDER — AMLODIPINE BESYLATE 5 MG/1
5 TABLET ORAL DAILY
Qty: 90 TABLET | Refills: 3 | Status: SHIPPED | OUTPATIENT
Start: 2019-03-01 | End: 2020-08-26

## 2019-03-01 RX ORDER — MESALAMINE 400 MG/1
800 CAPSULE, DELAYED RELEASE ORAL 4 TIMES DAILY PRN
Qty: 240 CAPSULE | Refills: 11 | Status: SHIPPED | OUTPATIENT
Start: 2019-03-01 | End: 2020-08-26

## 2019-03-01 RX ORDER — ESCITALOPRAM OXALATE 10 MG/1
10 TABLET ORAL DAILY
Qty: 90 TABLET | Refills: 3 | Status: SHIPPED | OUTPATIENT
Start: 2019-03-01 | End: 2020-03-31

## 2019-03-01 RX ORDER — LOSARTAN POTASSIUM 50 MG/1
50 TABLET ORAL DAILY
Qty: 90 TABLET | Refills: 3 | Status: SHIPPED | OUTPATIENT
Start: 2019-03-01 | End: 2020-03-31

## 2019-03-01 ASSESSMENT — ENCOUNTER SYMPTOMS
AGITATION: 0
HEMATURIA: 0
VOMITING: 0
NAUSEA: 0
EYE PAIN: 0
ABDOMINAL PAIN: 0
COUGH: 0
FEVER: 0
WHEEZING: 0
EYE ITCHING: 1
SHORTNESS OF BREATH: 0
PALPITATIONS: 0
LIGHT-HEADEDNESS: 0
NERVOUS/ANXIOUS: 1
CONFUSION: 0
MYALGIAS: 0
CHILLS: 0
ARTHRALGIAS: 0
BRUISES/BLEEDS EASILY: 0
FATIGUE: 0
DYSURIA: 0
EYE REDNESS: 1
DIARRHEA: 0
DIZZINESS: 0

## 2019-03-01 ASSESSMENT — ANXIETY QUESTIONNAIRES
2. NOT BEING ABLE TO STOP OR CONTROL WORRYING: NOT AT ALL
IF YOU CHECKED OFF ANY PROBLEMS ON THIS QUESTIONNAIRE, HOW DIFFICULT HAVE THESE PROBLEMS MADE IT FOR YOU TO DO YOUR WORK, TAKE CARE OF THINGS AT HOME, OR GET ALONG WITH OTHER PEOPLE: NOT DIFFICULT AT ALL
7. FEELING AFRAID AS IF SOMETHING AWFUL MIGHT HAPPEN: NOT AT ALL
1. FEELING NERVOUS, ANXIOUS, OR ON EDGE: NOT AT ALL
3. WORRYING TOO MUCH ABOUT DIFFERENT THINGS: NOT AT ALL
GAD7 TOTAL SCORE: 0
6. BECOMING EASILY ANNOYED OR IRRITABLE: NOT AT ALL
5. BEING SO RESTLESS THAT IT IS HARD TO SIT STILL: NOT AT ALL

## 2019-03-01 ASSESSMENT — PATIENT HEALTH QUESTIONNAIRE - PHQ9
SUM OF ALL RESPONSES TO PHQ QUESTIONS 1-9: 0
5. POOR APPETITE OR OVEREATING: NOT AT ALL

## 2019-03-01 ASSESSMENT — MIFFLIN-ST. JEOR: SCORE: 1863.03

## 2019-03-01 ASSESSMENT — PAIN SCALES - GENERAL: PAINLEVEL: NO PAIN (0)

## 2019-03-01 NOTE — TELEPHONE ENCOUNTER
Refill request for: amphetamine-fgrxhrovusivkbvls20 mg caps   From: [facility]  Rx written date: 03/01/2019  LOV: 03/01/2019 with PCP  Next appt: 03/04/2019  Protocol: none available     Pt presented to clinic today for refills of requested medication. Paper prescriptions x3 given. Unable to complete prescription refill per RN medication refill policy. Kamille Ponce RN on 3/1/2019 at 3:09 PM

## 2019-03-01 NOTE — TELEPHONE ENCOUNTER
Patient is out of [facility], patient appointment moved from Monday to today.      Mariela Puentes LPN 3/1/2019 9:59 AM

## 2019-03-01 NOTE — PATIENT INSTRUCTIONS
1. Attention deficit hyperactivity disorder (ADHD), combined type  2. Controlled substance agreement signed 1-3-18  - amphetamine-dextroamphetamine (ADDERALL XR) 30 MG 24 hr capsule; Take 1 capsule (30 mg) by mouth daily  Dispense: 30 capsule; Refill: 0  - amphetamine-dextroamphetamine (ADDERALL XR) 30 MG 24 hr capsule; Take 1 capsule (30 mg) by mouth daily  Dispense: 30 capsule; Refill: 0  - amphetamine-dextroamphetamine (ADDERALL XR) 30 MG 24 hr capsule; Take 1 capsule (30 mg) by mouth daily  Dispense: 30 capsule; Refill: 0  - LORazepam (ATIVAN) 0.5 MG tablet; Take 1 tablet (0.5 mg) by mouth 2 times daily as needed  Dispense: 60 tablet; Refill: 5    3. Major depression in complete remission (H)  - amitriptyline (ELAVIL) 25 MG tablet; Take 2 tablets (50 mg) by mouth At Bedtime  Dispense: 180 tablet; Refill: 3  - escitalopram (LEXAPRO) 10 MG tablet; Take 1 tablet (10 mg) by mouth daily  Dispense: 90 tablet; Refill: 3    4. Benign essential hypertension  - amLODIPine (NORVASC) 5 MG tablet; Take 1 tablet (5 mg) by mouth daily  Dispense: 90 tablet; Refill: 3  - losartan (COZAAR) 50 MG tablet; Take 1 tablet (50 mg) by mouth daily  Dispense: 90 tablet; Refill: 3    5. Essential familial hypercholesterolemia  - atorvastatin (LIPITOR) 80 MG tablet; Take 1 tablet (80 mg) by mouth daily  Dispense: 90 tablet; Refill: 3      -- May need to add a Fibrate to your cholesterol regimen.   -- Continue Lovaza / omega-3 for triglyceride management.       6. Inflammatory bowel disease -- not definitive of UC vs chrons based on blood work in 2006  - mesalamine (DELZICOL) 400 MG DR capsule; Take 2 capsules (800 mg) by mouth 4 times daily as needed  Dispense: 240 capsule; Refill: 11    7. Motion sickness, subsequent encounter  - scopolamine (TRANSDERM) 1 MG/3DAYS 72 hr patch; Place 1 patch onto the skin every 72 hours on dry, clean, hairless skin every 72 hours. For Motion Sickness  Dispense: 10 patch; Refill: 11    8. Heartburn  -  omeprazole (PRILOSEC) 20 MG DR capsule; Take 1 capsule (20 mg) by mouth daily 30-60 minutes prior to 1st meal of the day - limit use as able  Dispense: 90 capsule; Refill: 3      Return in approximately 3 month(s), or sooner as needed for follow-up with Dr. Salcido.  - Med Refills    Clinic : [phone]  Appointment line: [phone]

## 2019-03-01 NOTE — PROGRESS NOTES
"Nursing Notes:   Mariela Puentes LPN  3/1/2019 11:56 AM  Signed  Patient presents to the clinic for Adderall refill, last administration was 0500 today.  Contract updated at last office visit 11-21-18.    Chief Complaint   Patient presents with     Recheck Medication       Initial /74 (BP Location: Right arm, Patient Position: Sitting, Cuff Size: Adult Regular)   Pulse 80   Temp 97.2  F (36.2  C) (Tympanic)   Resp 18   Ht 1.797 m (5' 10.75\")   Wt 93 kg (205 lb)   BMI 28.79 kg/m    Estimated body mass index is 28.79 kg/m  as calculated from the following:    Height as of this encounter: 1.797 m (5' 10.75\").    Weight as of this encounter: 93 kg (205 lb).  Medication Reconciliation: complete    Mariela Puentes LPN    Nursing note reviewed with patient.  Accuracy and completeness verified.   Mr. Hess is a 38 year old male who:  Patient presents with:  Recheck Medication      ICD-10-CM    1. Attention deficit hyperactivity disorder (ADHD), combined type F90.2 amphetamine-dextroamphetamine (ADDERALL XR) 30 MG 24 hr capsule     amphetamine-dextroamphetamine (ADDERALL XR) 30 MG 24 hr capsule     amphetamine-dextroamphetamine (ADDERALL XR) 30 MG 24 hr capsule     LORazepam (ATIVAN) 0.5 MG tablet   2. Controlled substance agreement signed 1-3-18 Z79.899 amphetamine-dextroamphetamine (ADDERALL XR) 30 MG 24 hr capsule     amphetamine-dextroamphetamine (ADDERALL XR) 30 MG 24 hr capsule     amphetamine-dextroamphetamine (ADDERALL XR) 30 MG 24 hr capsule   3. Major depression in complete remission (H) F32.5 amitriptyline (ELAVIL) 25 MG tablet     escitalopram (LEXAPRO) 10 MG tablet   4. Benign essential hypertension I10 amLODIPine (NORVASC) 5 MG tablet     losartan (COZAAR) 50 MG tablet   5. Essential familial hypercholesterolemia E78.01 atorvastatin (LIPITOR) 80 MG tablet   6. Inflammatory bowel disease -- not definitive of UC vs chrons based on blood work in 2006 K52.9 mesalamine (DELZICOL) 400 MG DR capsule "   7. Motion sickness, subsequent encounter T75.3XXD scopolamine (TRANSDERM) 1 MG/3DAYS 72 hr patch   8. Heartburn R12 omeprazole (PRILOSEC) 20 MG DR capsule     HPI  Patient presents for follow-up of multiple issues.    ADHD, currently stable.  Needs refills of Adderall.  Controlled substance agreement is completed.  He will need to complete a new and at his next appointment.  Urine drug screen is up-to-date.  Proper medication use misuse reviewed.  Patient has been using medications appropriately.  Prescriptions refilled times 3 months.    Depression, currently in remission.  Doing well with current medication regimen.  Needs refills of Lexapro.    Hypertension, currently well controlled.  Tolerating medication.  No side effects reported.  Continue Norvasc.  Needs refills.    Familial hyperlipidemia, still try and get his cholesterol levels under control.  Currently taking 4000 mg of omega-3, plus Lipitor 80 mg.  He wants to check fasting cholesterol on Monday.    We may end up adding defibrillator even bile acid sequestrant to his medication regimen.    Inflammatory bowel disease, no significant diarrhea recently.  Doing well with current medication regimen.  Needs refills.    Motion sickness, still intermittently using scopolamine patches.  Wants updated prescription but does not need refills today.    Heartburn, stable but ongoing if he misses his medications.  Needs refills.    Functional Capacity: > 4 METS.   Review of Systems   Constitutional: Negative for chills, fatigue and fever.   HENT: Negative for congestion and hearing loss.    Eyes: Positive for redness and itching. Negative for pain and visual disturbance.        + eye irritation - rubs eyes on his pillow if sleeps very hard. Has eye drops that he uses intermittently   Respiratory: Negative for cough, shortness of breath and wheezing.    Cardiovascular: Negative for chest pain and palpitations.   Gastrointestinal: Negative for abdominal pain,  diarrhea, nausea and vomiting.   Endocrine: Negative for cold intolerance and heat intolerance.   Genitourinary: Negative for dysuria and hematuria.   Musculoskeletal: Negative for arthralgias and myalgias.   Skin: Negative for pallor.   Allergic/Immunologic: Positive for environmental allergies and food allergies. Negative for immunocompromised state.   Neurological: Negative for dizziness and light-headedness.   Hematological: Does not bruise/bleed easily.   Psychiatric/Behavioral: Negative for agitation and confusion. The patient is nervous/anxious.         ANNMARIE:   ANNMARIE-7 SCORE 8/22/2018 11/21/2018 3/1/2019   Total Score 0 0 0     PHQ9:  PHQ-9 SCORE 8/22/2018 11/21/2018 3/1/2019   PHQ-9 Total Score 0 0 0       I have personally reviewed the past medical history, past surgical history, medications, allergies, family and social history as listed below.     Allergies   Allergen Reactions     Cats      Other reaction(s): Runny Nose  Itchy eyes and hand swelling     Food      Other reaction(s): Angioedema  Raw Parsnip AND Raw Carrots     Lisinopril Cough       Current Outpatient Medications   Medication Sig Dispense Refill     amitriptyline (ELAVIL) 25 MG tablet Take 2 tablets (50 mg) by mouth At Bedtime 180 tablet 3     amLODIPine (NORVASC) 5 MG tablet Take 1 tablet (5 mg) by mouth daily 90 tablet 3     [START ON 4/26/2019] amphetamine-dextroamphetamine (ADDERALL XR) 30 MG 24 hr capsule Take 1 capsule (30 mg) by mouth daily 30 capsule 0     [START ON 3/29/2019] amphetamine-dextroamphetamine (ADDERALL XR) 30 MG 24 hr capsule Take 1 capsule (30 mg) by mouth daily 30 capsule 0     amphetamine-dextroamphetamine (ADDERALL XR) 30 MG 24 hr capsule Take 1 capsule (30 mg) by mouth daily 30 capsule 0     atorvastatin (LIPITOR) 80 MG tablet Take 1 tablet (80 mg) by mouth daily 90 tablet 3     budesonide (RINOCORT AQUA) 32 MCG/ACT nasal spray Inhale 1 Spray into both nostrils once daily. 3 Bottle 3     escitalopram (LEXAPRO) 10 MG  tablet Take 1 tablet (10 mg) by mouth daily 90 tablet 3     LORazepam (ATIVAN) 0.5 MG tablet Take 1 tablet (0.5 mg) by mouth 2 times daily as needed 60 tablet 5     losartan (COZAAR) 50 MG tablet Take 1 tablet (50 mg) by mouth daily 90 tablet 3     mesalamine (DELZICOL) 400 MG DR capsule Take 2 capsules (800 mg) by mouth 4 times daily as needed 240 capsule 11     methylPREDNISolone (MEDROL) 4 MG tablet TAKE AS DIRECTED FOR 10 DAYS FOR ULCERATIVE COLITIS FLAIR. REPEAT EVERY 4 WEEKS IF NEEDED. MAXIMUM OF 40 TABLETS MONTHLY 40 tablet 11     omega-3 acid ethyl esters (LOVAZA) 1 g capsule Take 1 capsule (1 g) by mouth 4 times daily 360 capsule 3     omeprazole (PRILOSEC) 20 MG DR capsule Take 1 capsule (20 mg) by mouth daily 30-60 minutes prior to 1st meal of the day - limit use as able 90 capsule 3     scopolamine (TRANSDERM) 1 MG/3DAYS 72 hr patch Place 1 patch onto the skin every 72 hours on dry, clean, hairless skin every 72 hours. For Motion Sickness 10 patch 11     SUMAtriptan (IMITREX) 50 MG tablet Take 1 tablet (50 mg) by mouth every 2 hours as needed for migraine Max Dose 4 tablets per 24 hrs. 6 tablet 11        Patient Active Problem List    Diagnosis Date Noted     Familial hypercholesterolemia - at least heterozygote 04/18/2018     Priority: Medium     Major depression in complete remission (H) 04/04/2018     Priority: Medium     Anxiety 12/01/2015     Priority: Medium     Attention deficit hyperactivity disorder (ADHD), combined type 12/01/2015     Priority: Medium     Controlled substance agreement signed 1-3-18 12/01/2015     Priority: Medium     Migraine without aura and without status migrainosus, not intractable 06/19/2015     Priority: Medium     Benign essential hypertension 08/13/2014     Priority: Medium     Motion sickness 08/13/2014     Priority: Medium     Sprain of left thumb 08/13/2014     Priority: Medium     Social phobia 12/19/2013     Priority: Medium     Allergic rhinitis due to cat hair  12/10/2013     Priority: Medium     Dust allergy 12/10/2013     Priority: Medium     Elevated liver enzymes 12/10/2013     Priority: Medium     GERD (gastroesophageal reflux disease) 12/10/2013     Priority: Medium     Hair loss 12/10/2013     Priority: Medium     Lateral epicondylitis 12/10/2013     Priority: Medium     Medial epicondylitis of elbow 12/10/2013     Priority: Medium     Seasonal allergic rhinitis 12/10/2013     Priority: Medium     Sleeping difficulties 12/10/2013     Priority: Medium     Inflammatory bowel disease -- not definitive of UC vs chrons based on blood work in 2006 08/09/2007     Priority: Medium     Past Medical History:   Diagnosis Date     Allergic rhinitis due to animal hair and dander     12/10/2013     Gastro-esophageal reflux disease without esophagitis     12/10/2013     Major depressive disorder, single episode     12/10/2013,Previously followed with Psychiatry - was on Remeron, Adderall, Lexapro in the past. Awaiting to re-establish psychiatry care again.     Migraine without status migrainosus, not intractable     12/10/2013     Other allergic rhinitis     12/10/2013     Other seasonal allergic rhinitis     12/10/2013     Sleep disorder     12/10/2013     Ulcerative colitis without complications (H)     8/9/2007     Past Surgical History:   Procedure Laterality Date     APPENDECTOMY OPEN      2/4/13,Dr. Givens/[facility]     Social History     Socioeconomic History     Marital status: Single     Spouse name: None     Number of children: None     Years of education: None     Highest education level: None   Occupational History     None   Social Needs     Financial resource strain: None     Food insecurity:     Worry: None     Inability: None     Transportation needs:     Medical: None     Non-medical: None   Tobacco Use     Smoking status: Never Smoker     Smokeless tobacco: Never Used   Substance and Sexual Activity     Alcohol use: Yes     Alcohol/week: 0.0 oz     Comment: Alcoholic  "Drinks/day: ocassionally     Drug use: No     Sexual activity: No   Lifestyle     Physical activity:     Days per week: None     Minutes per session: None     Stress: None   Relationships     Social connections:     Talks on phone: None     Gets together: None     Attends Oriental orthodox service: None     Active member of club or organization: None     Attends meetings of clubs or organizations: None     Relationship status: None     Intimate partner violence:     Fear of current or ex partner: None     Emotionally abused: None     Physically abused: None     Forced sexual activity: None   Other Topics Concern     Parent/sibling w/ CABG, MI or angioplasty before 65F 55M? 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    Moved back to [facility] ([place], [place]) in about November 2012.     For family mental health issues, he notes that an aunt committed suicide in    October 2013 and 3 maternal aunts and his mother are on antidepressants. His    maternal grandmother was diagnosed with schizoaffective disorder, as was a    male cousin. Attention deficit hyperactivity disorder was significant for an    uncle and the client's cousins.     Family History   Problem Relation Age of Onset     Colon Cancer Paternal Grandmother         Cancer-colon     Colon Cancer Maternal Grandmother         Cancer-colon     Other - See Comments Other         Psychiatric illness,Aunt - suicide fall 2013       EXAM:   Vitals:    03/01/19 1152   BP: 128/74   BP Location: Right arm   Patient Position: Sitting   Cuff Size: Adult Regular   Pulse: 80   Resp: 18   Temp: 97.2  F (36.2  C)   TempSrc: Tympanic   Weight: 93 kg (205 lb)   Height: 1.797 m (5' 10.75\")       Current Pain Score: No Pain (0)     BP Readings from Last 3 Encounters:   03/01/19 128/74   11/21/18 " "130/76   08/22/18 138/82      Wt Readings from Last 3 Encounters:   03/01/19 93 kg (205 lb)   11/21/18 90.7 kg (200 lb)   08/22/18 92.1 kg (203 lb)      Estimated body mass index is 28.79 kg/m  as calculated from the following:    Height as of this encounter: 1.797 m (5' 10.75\").    Weight as of this encounter: 93 kg (205 lb).     Physical Exam   Constitutional: He appears well-developed and well-nourished. No distress.   HENT:   Head: Normocephalic and atraumatic.   Eyes: Conjunctivae are normal. No scleral icterus.   Neck: Neck supple.   Cardiovascular: Normal rate and regular rhythm.   Pulmonary/Chest: Effort normal.   Abdominal: Soft. There is no tenderness.   Musculoskeletal: He exhibits no deformity.   Lymphadenopathy:     He has no cervical adenopathy.   Neurological: He is alert.   Skin: Skin is warm and dry. No rash noted. He is not diaphoretic.   Psychiatric: He has a normal mood and affect.      Procedures   INVESTIGATIONS:  Results for orders placed or performed in visit on 11/21/18   Comprehensive metabolic panel   Result Value Ref Range    Sodium 137 134 - 144 mmol/L    Potassium 3.8 3.5 - 5.1 mmol/L    Chloride 102 98 - 107 mmol/L    Carbon Dioxide 24 21 - 31 mmol/L    Anion Gap 11 3 - 14 mmol/L    Glucose 109 (H) 70 - 105 mg/dL    Urea Nitrogen 22 7 - 25 mg/dL    Creatinine 1.02 0.70 - 1.30 mg/dL    GFR Estimate 81 >60 mL/min/1.7m2    GFR Estimate If Black >90 >60 mL/min/1.7m2    Calcium 10.1 8.6 - 10.3 mg/dL    Bilirubin Total 0.4 0.3 - 1.0 mg/dL    Albumin 4.7 3.5 - 5.7 g/dL    Protein Total 7.2 6.4 - 8.9 g/dL    Alkaline Phosphatase 79 34 - 104 U/L     (H) 7 - 52 U/L    AST 93 (H) 13 - 39 U/L   Lipid Profile   Result Value Ref Range    Cholesterol 234 (H) <200 mg/dL    Triglycerides 668 (H) <150 mg/dL    HDL Cholesterol 46 23 - 92 mg/dL    LDL Cholesterol Calculated  <100 mg/dL     Cannot estimate LDL when triglyceride exceeds 400 mg/dL    Non HDL Cholesterol 188 (H) <130 mg/dL   Drug of " Abuse Screen Urine GH   Result Value Ref Range    Amphetamine Qual Urine Presumptive positive-Unconfirmed result (A) NDET^Not Detected    Benzodiazepine Qual Urine Not Detected NDET^Not Detected    Cocaine Qual Urine Not Detected NDET^Not Detected    Methadone Qual Urine Not Detected NDET^Not Detected    PCP Qual Urine Not Detected NDET^Not Detected    Opiates Qualitative Urine Not Detected NDET^Not Detected    Oxycodone Qualitative Urine Not Detected NDET^Not Detected ng/mL    Propoxyphene Qualitative Urine Not Detected NDET^Not Detected ng/mL    Tricyclic Antidepressants Qual Urine Presumptive positive-Unconfirmed result (A) NDET^Not Detected ng/mL    Methamphetamine Qualitative Urine Not Detected NDET^Not Detected ng/mL    Barbiturates Qual Urine Not Detected NDET^Not Detected    Cannabinoids Qualitative Urine Not Detected NDET^Not Detected ng/mL    Buprenorphine Qualitative Urine Not Detected NDET^Not Detected ng/mL       ASSESSMENT AND PLAN:  Problem List Items Addressed This Visit        Nervous and Auditory    Controlled substance agreement signed 1-3-18    Relevant Medications    amphetamine-dextroamphetamine (ADDERALL XR) 30 MG 24 hr capsule (Start on 4/26/2019)    amphetamine-dextroamphetamine (ADDERALL XR) 30 MG 24 hr capsule (Start on 3/29/2019)    amphetamine-dextroamphetamine (ADDERALL XR) 30 MG 24 hr capsule       Digestive    Motion sickness    Relevant Medications    scopolamine (TRANSDERM) 1 MG/3DAYS 72 hr patch    Inflammatory bowel disease -- not definitive of UC vs chrons based on blood work in 2006    Relevant Medications    mesalamine (DELZICOL) 400 MG DR capsule    scopolamine (TRANSDERM) 1 MG/3DAYS 72 hr patch    omeprazole (PRILOSEC) 20 MG DR capsule       Circulatory    Benign essential hypertension    Relevant Medications    amLODIPine (NORVASC) 5 MG tablet    losartan (COZAAR) 50 MG tablet       Behavioral    Attention deficit hyperactivity disorder (ADHD), combined type    Relevant  Medications    amphetamine-dextroamphetamine (ADDERALL XR) 30 MG 24 hr capsule (Start on 4/26/2019)    amphetamine-dextroamphetamine (ADDERALL XR) 30 MG 24 hr capsule (Start on 3/29/2019)    amphetamine-dextroamphetamine (ADDERALL XR) 30 MG 24 hr capsule    LORazepam (ATIVAN) 0.5 MG tablet    Major depression in complete remission (H)    Relevant Medications    amitriptyline (ELAVIL) 25 MG tablet    escitalopram (LEXAPRO) 10 MG tablet    LORazepam (ATIVAN) 0.5 MG tablet      Other Visit Diagnoses     Essential familial hypercholesterolemia        Relevant Medications    atorvastatin (LIPITOR) 80 MG tablet    Heartburn        Relevant Medications    omeprazole (PRILOSEC) 20 MG DR capsule        reviewed diet, exercise and weight control  -- Expected clinical course discussed    -- Medications and their side effects discussed    Patient Instructions   1. Attention deficit hyperactivity disorder (ADHD), combined type  2. Controlled substance agreement signed 1-3-18  - amphetamine-dextroamphetamine (ADDERALL XR) 30 MG 24 hr capsule; Take 1 capsule (30 mg) by mouth daily  Dispense: 30 capsule; Refill: 0  - amphetamine-dextroamphetamine (ADDERALL XR) 30 MG 24 hr capsule; Take 1 capsule (30 mg) by mouth daily  Dispense: 30 capsule; Refill: 0  - amphetamine-dextroamphetamine (ADDERALL XR) 30 MG 24 hr capsule; Take 1 capsule (30 mg) by mouth daily  Dispense: 30 capsule; Refill: 0  - LORazepam (ATIVAN) 0.5 MG tablet; Take 1 tablet (0.5 mg) by mouth 2 times daily as needed  Dispense: 60 tablet; Refill: 5    3. Major depression in complete remission (H)  - amitriptyline (ELAVIL) 25 MG tablet; Take 2 tablets (50 mg) by mouth At Bedtime  Dispense: 180 tablet; Refill: 3  - escitalopram (LEXAPRO) 10 MG tablet; Take 1 tablet (10 mg) by mouth daily  Dispense: 90 tablet; Refill: 3    4. Benign essential hypertension  - amLODIPine (NORVASC) 5 MG tablet; Take 1 tablet (5 mg) by mouth daily  Dispense: 90 tablet; Refill: 3  - losartan  (COZAAR) 50 MG tablet; Take 1 tablet (50 mg) by mouth daily  Dispense: 90 tablet; Refill: 3    5. Essential familial hypercholesterolemia  - atorvastatin (LIPITOR) 80 MG tablet; Take 1 tablet (80 mg) by mouth daily  Dispense: 90 tablet; Refill: 3      -- May need to add a Fibrate to your cholesterol regimen.   -- Continue Lovaza / omega-3 for triglyceride management.       6. Inflammatory bowel disease -- not definitive of UC vs chrons based on blood work in 2006  - mesalamine (DELZICOL) 400 MG DR capsule; Take 2 capsules (800 mg) by mouth 4 times daily as needed  Dispense: 240 capsule; Refill: 11    7. Motion sickness, subsequent encounter  - scopolamine (TRANSDERM) 1 MG/3DAYS 72 hr patch; Place 1 patch onto the skin every 72 hours on dry, clean, hairless skin every 72 hours. For Motion Sickness  Dispense: 10 patch; Refill: 11    8. Heartburn  - omeprazole (PRILOSEC) 20 MG DR capsule; Take 1 capsule (20 mg) by mouth daily 30-60 minutes prior to 1st meal of the day - limit use as able  Dispense: 90 capsule; Refill: 3      Return in approximately 3 month(s), or sooner as needed for follow-up with Dr. Salcido.  - Med Refills    Clinic : [phone]  Appointment line: [phone]      Jarad Salcido MD  Internal Medicine  [facility]     Portions of this note were dictated using speech recognition software. The note has been proofread but errors in the text may have been overlooked. Please contact me if there are any concerns regarding the accuracy of the dictation.

## 2019-03-01 NOTE — TELEPHONE ENCOUNTER
Pt called stating pharmacy sent medication request and pharmacy told pt to call here. States no call back is needed

## 2019-03-02 ASSESSMENT — ANXIETY QUESTIONNAIRES: GAD7 TOTAL SCORE: 0

## 2019-03-20 DIAGNOSIS — I10 BENIGN ESSENTIAL HYPERTENSION: ICD-10-CM

## 2019-03-20 DIAGNOSIS — E78.01 FAMILIAL HYPERCHOLESTEROLEMIA: ICD-10-CM

## 2019-03-20 LAB
ALBUMIN SERPL-MCNC: 5.1 G/DL (ref 3.5–5.7)
ALP SERPL-CCNC: 66 U/L (ref 34–104)
ALT SERPL W P-5'-P-CCNC: 61 U/L (ref 7–52)
ANION GAP SERPL CALCULATED.3IONS-SCNC: 10 MMOL/L (ref 3–14)
AST SERPL W P-5'-P-CCNC: 44 U/L (ref 13–39)
BILIRUB SERPL-MCNC: 0.6 MG/DL (ref 0.3–1)
BUN SERPL-MCNC: 17 MG/DL (ref 7–25)
CALCIUM SERPL-MCNC: 10.1 MG/DL (ref 8.6–10.3)
CHLORIDE SERPL-SCNC: 102 MMOL/L (ref 98–107)
CHOLEST SERPL-MCNC: 196 MG/DL
CO2 SERPL-SCNC: 28 MMOL/L (ref 21–31)
CREAT SERPL-MCNC: 1.39 MG/DL (ref 0.7–1.3)
GFR SERPL CREATININE-BSD FRML MDRD: 57 ML/MIN/{1.73_M2}
GLUCOSE SERPL-MCNC: 107 MG/DL (ref 70–105)
HDLC SERPL-MCNC: 45 MG/DL (ref 23–92)
LDLC SERPL CALC-MCNC: 112 MG/DL
NONHDLC SERPL-MCNC: 151 MG/DL
POTASSIUM SERPL-SCNC: 4.8 MMOL/L (ref 3.5–5.1)
PROT SERPL-MCNC: 8.2 G/DL (ref 6.4–8.9)
SODIUM SERPL-SCNC: 140 MMOL/L (ref 134–144)
TRIGL SERPL-MCNC: 194 MG/DL

## 2019-03-20 PROCEDURE — 80053 COMPREHEN METABOLIC PANEL: CPT | Performed by: INTERNAL MEDICINE

## 2019-03-20 PROCEDURE — 36415 COLL VENOUS BLD VENIPUNCTURE: CPT | Performed by: INTERNAL MEDICINE

## 2019-03-20 PROCEDURE — 80061 LIPID PANEL: CPT | Performed by: INTERNAL MEDICINE

## 2019-05-21 DIAGNOSIS — F90.2 ATTENTION DEFICIT HYPERACTIVITY DISORDER (ADHD), COMBINED TYPE: ICD-10-CM

## 2019-05-21 DIAGNOSIS — Z79.899 CONTROLLED SUBSTANCE AGREEMENT SIGNED: ICD-10-CM

## 2019-05-22 RX ORDER — DEXTROAMPHETAMINE SACCHARATE, AMPHETAMINE ASPARTATE MONOHYDRATE, DEXTROAMPHETAMINE SULFATE AND AMPHETAMINE SULFATE 7.5; 7.5; 7.5; 7.5 MG/1; MG/1; MG/1; MG/1
CAPSULE, EXTENDED RELEASE ORAL
Qty: 30 CAPSULE | OUTPATIENT
Start: 2019-05-22

## 2019-05-22 NOTE — TELEPHONE ENCOUNTER
Routing refill request to provider for review/approval because:  Drug not on the [facility] refill protocol   LOV; 3/1/19  Patient needs appointment for refills per policy.    Patient has appointment scheduled for 5/30/19  Attempted to contact patient to verify need for refill and if she needs sooner appointment .  No answer and unable to leave message.    Grecia Harris RN on 5/22/2019 at 11:18 AM

## 2019-05-23 ENCOUNTER — TELEPHONE (OUTPATIENT)
Dept: INTERNAL MEDICINE | Facility: OTHER | Age: 39
End: 2019-05-23

## 2019-05-23 NOTE — TELEPHONE ENCOUNTER
Please return call regarding  Patients medication request. It looks like Pelon TILLEY attempted to call. Thanks!

## 2019-05-23 NOTE — TELEPHONE ENCOUNTER
After verifying pts name and date of birth with pt, pt notified he needs appointment for medication refills. Okay to move appointment to 5/24 @0900 per Mariela.  Edita Walker
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
## 2019-05-24 ENCOUNTER — OFFICE VISIT (OUTPATIENT)
Dept: INTERNAL MEDICINE | Facility: OTHER | Age: 39
End: 2019-05-24
Attending: INTERNAL MEDICINE
Payer: COMMERCIAL

## 2019-05-24 VITALS
DIASTOLIC BLOOD PRESSURE: 74 MMHG | WEIGHT: 208.13 LBS | HEART RATE: 72 BPM | SYSTOLIC BLOOD PRESSURE: 122 MMHG | RESPIRATION RATE: 16 BRPM | BODY MASS INDEX: 29.23 KG/M2 | TEMPERATURE: 97 F

## 2019-05-24 DIAGNOSIS — Z79.899 CONTROLLED SUBSTANCE AGREEMENT SIGNED: ICD-10-CM

## 2019-05-24 DIAGNOSIS — F90.2 ATTENTION DEFICIT HYPERACTIVITY DISORDER (ADHD), COMBINED TYPE: ICD-10-CM

## 2019-05-24 DIAGNOSIS — E78.01 FAMILIAL HYPERCHOLESTEROLEMIA: Primary | ICD-10-CM

## 2019-05-24 LAB
CHOLEST SERPL-MCNC: 151 MG/DL
HDLC SERPL-MCNC: 44 MG/DL (ref 23–92)
LDLC SERPL CALC-MCNC: 86 MG/DL
NONHDLC SERPL-MCNC: 107 MG/DL
TRIGL SERPL-MCNC: 105 MG/DL

## 2019-05-24 PROCEDURE — 36415 COLL VENOUS BLD VENIPUNCTURE: CPT | Mod: ZL | Performed by: INTERNAL MEDICINE

## 2019-05-24 PROCEDURE — 80061 LIPID PANEL: CPT | Mod: ZL | Performed by: INTERNAL MEDICINE

## 2019-05-24 PROCEDURE — 99214 OFFICE O/P EST MOD 30 MIN: CPT | Performed by: INTERNAL MEDICINE

## 2019-05-24 PROCEDURE — G0463 HOSPITAL OUTPT CLINIC VISIT: HCPCS

## 2019-05-24 RX ORDER — LORAZEPAM 0.5 MG/1
0.5 TABLET ORAL 2 TIMES DAILY PRN
Qty: 60 TABLET | Refills: 2 | Status: SHIPPED | OUTPATIENT
Start: 2019-05-24 | End: 2019-08-16

## 2019-05-24 RX ORDER — DEXTROAMPHETAMINE SACCHARATE, AMPHETAMINE ASPARTATE MONOHYDRATE, DEXTROAMPHETAMINE SULFATE AND AMPHETAMINE SULFATE 7.5; 7.5; 7.5; 7.5 MG/1; MG/1; MG/1; MG/1
30 CAPSULE, EXTENDED RELEASE ORAL DAILY
Qty: 30 CAPSULE | Refills: 0 | Status: SHIPPED | OUTPATIENT
Start: 2019-05-24 | End: 2019-08-16

## 2019-05-24 RX ORDER — DEXTROAMPHETAMINE SACCHARATE, AMPHETAMINE ASPARTATE MONOHYDRATE, DEXTROAMPHETAMINE SULFATE AND AMPHETAMINE SULFATE 7.5; 7.5; 7.5; 7.5 MG/1; MG/1; MG/1; MG/1
30 CAPSULE, EXTENDED RELEASE ORAL DAILY
Qty: 30 CAPSULE | Refills: 0 | Status: SHIPPED | OUTPATIENT
Start: 2019-07-19 | End: 2019-08-16

## 2019-05-24 RX ORDER — DEXTROAMPHETAMINE SACCHARATE, AMPHETAMINE ASPARTATE MONOHYDRATE, DEXTROAMPHETAMINE SULFATE AND AMPHETAMINE SULFATE 7.5; 7.5; 7.5; 7.5 MG/1; MG/1; MG/1; MG/1
30 CAPSULE, EXTENDED RELEASE ORAL DAILY
Qty: 30 CAPSULE | Refills: 0 | Status: SHIPPED | OUTPATIENT
Start: 2019-06-21 | End: 2019-08-16

## 2019-05-24 ASSESSMENT — ANXIETY QUESTIONNAIRES
5. BEING SO RESTLESS THAT IT IS HARD TO SIT STILL: NOT AT ALL
1. FEELING NERVOUS, ANXIOUS, OR ON EDGE: NOT AT ALL
GAD7 TOTAL SCORE: 0
IF YOU CHECKED OFF ANY PROBLEMS ON THIS QUESTIONNAIRE, HOW DIFFICULT HAVE THESE PROBLEMS MADE IT FOR YOU TO DO YOUR WORK, TAKE CARE OF THINGS AT HOME, OR GET ALONG WITH OTHER PEOPLE: NOT DIFFICULT AT ALL
2. NOT BEING ABLE TO STOP OR CONTROL WORRYING: NOT AT ALL
3. WORRYING TOO MUCH ABOUT DIFFERENT THINGS: NOT AT ALL
6. BECOMING EASILY ANNOYED OR IRRITABLE: NOT AT ALL
7. FEELING AFRAID AS IF SOMETHING AWFUL MIGHT HAPPEN: NOT AT ALL

## 2019-05-24 ASSESSMENT — ENCOUNTER SYMPTOMS
NAUSEA: 0
CONFUSION: 0
NERVOUS/ANXIOUS: 1
BRUISES/BLEEDS EASILY: 0
DIZZINESS: 0
HEMATURIA: 0
MYALGIAS: 0
FATIGUE: 0
ABDOMINAL PAIN: 0
AGITATION: 0
DYSURIA: 0
DIARRHEA: 0
EYE ITCHING: 1
FEVER: 0
PALPITATIONS: 0
SHORTNESS OF BREATH: 0
ARTHRALGIAS: 0
CHILLS: 0
COUGH: 0
VOMITING: 0
EYE PAIN: 0
LIGHT-HEADEDNESS: 0
EYE REDNESS: 1
WHEEZING: 0

## 2019-05-24 ASSESSMENT — PATIENT HEALTH QUESTIONNAIRE - PHQ9
5. POOR APPETITE OR OVEREATING: NOT AT ALL
SUM OF ALL RESPONSES TO PHQ QUESTIONS 1-9: 0

## 2019-05-24 ASSESSMENT — PAIN SCALES - GENERAL: PAINLEVEL: NO PAIN (0)

## 2019-05-24 NOTE — PATIENT INSTRUCTIONS
Lipid panel today.   Medications refilled.   UTOX is up to date.     Continue lipitor.     Return in approximately 3 month(s), or sooner as needed for follow-up with Dr. Salcido.    - Med Refills    - Bring your remaining pills / pill bottles with to: Each of your Med Management/refill  appointments for pill counts.   - Urine drug screens for controlled medications will be completed every 3 to 12 months.   - Random pill counts and urine drug testing may occur.   - No illicit drug use or pill sharing will be tolerated.     Clinic : [phone]  Appointment line: [phone]

## 2019-05-24 NOTE — LETTER
May 24, 2019      Kike Hess  [address] [place] [address]        Dear ,    We are writing to inform you of your test results.    Cholesterol levels have improved some more.  Continue current dosing.    Resulted Orders   Lipid Panel   Result Value Ref Range    Cholesterol 151 <200 mg/dL    Triglycerides 105 <150 mg/dL    HDL Cholesterol 44 23 - 92 mg/dL    LDL Cholesterol Calculated 86 <100 mg/dL      Comment:      Desirable:       <100 mg/dl    Non HDL Cholesterol 107 <130 mg/dL       If you have any questions or concerns, please call the clinic at the number listed above.       Sincerely,        Jarad Salcido MD

## 2019-05-24 NOTE — NURSING NOTE
"Patient presents to the clinic for medication management. Last administration of Adderall was at 0500 today.  Contract updated today.        Chief Complaint   Patient presents with     Recheck Medication       Initial /74 (BP Location: Right arm, Patient Position: Sitting, Cuff Size: Adult Regular)   Pulse 72   Temp 97  F (36.1  C) (Tympanic)   Resp 16   Wt 94.4 kg (208 lb 2 oz)   BMI 29.23 kg/m   Estimated body mass index is 29.23 kg/m  as calculated from the following:    Height as of 3/1/19: 1.797 m (5' 10.75\").    Weight as of this encounter: 94.4 kg (208 lb 2 oz).  Medication Reconciliation: complete    Mariela Puentes LPN    "

## 2019-05-25 ASSESSMENT — ANXIETY QUESTIONNAIRES: GAD7 TOTAL SCORE: 0

## 2019-06-21 DIAGNOSIS — H10.33 ACUTE CONJUNCTIVITIS OF BOTH EYES, UNSPECIFIED ACUTE CONJUNCTIVITIS TYPE: ICD-10-CM

## 2019-06-24 RX ORDER — SULFACETAMIDE SODIUM 100 MG/ML
1 SOLUTION/ DROPS OPHTHALMIC
Qty: 15 ML | Refills: 3 | Status: SHIPPED | OUTPATIENT
Start: 2019-06-24 | End: 2019-08-16

## 2019-06-24 NOTE — TELEPHONE ENCOUNTER
Received refill request from [facility] pharmacy for sulfacetamide (BLEPH-10) 10 % ophthalmic solution    Called patient and patient states he has a chronic inflammation problem in his eye for when he sleeps, states his eye gets irritated every couple months, patient states  is aware.    Grecia Harris RN on 6/24/2019 at 2:35 PM

## 2019-08-14 NOTE — PROGRESS NOTES
Nursing Notes:   Enedina Dalton LPN  8/16/2019  8:35 AM  Signed  Patient presents to the clinic for med refill.     Medication Reconciliation: complete   Enedina Dalton LPN............. August 16, 2019 8:28 AM              Nursing note reviewed with patient.  Accuracy and completeness verified.   Mr. Hess is a 39 year old male who:  Patient presents with:  Refill Request      ICD-10-CM    1. Attention deficit hyperactivity disorder (ADHD), combined type F90.2 amphetamine-dextroamphetamine (ADDERALL XR) 30 MG 24 hr capsule     amphetamine-dextroamphetamine (ADDERALL XR) 30 MG 24 hr capsule     amphetamine-dextroamphetamine (ADDERALL XR) 30 MG 24 hr capsule     LORazepam (ATIVAN) 0.5 MG tablet   2. Controlled substance agreement signed 5/24/2019 Z79.899 amphetamine-dextroamphetamine (ADDERALL XR) 30 MG 24 hr capsule     amphetamine-dextroamphetamine (ADDERALL XR) 30 MG 24 hr capsule     amphetamine-dextroamphetamine (ADDERALL XR) 30 MG 24 hr capsule     LORazepam (ATIVAN) 0.5 MG tablet   3. Long term current use of opiate analgesic Z79.891 Pain Drug Scr UR W Rptd Meds   4. Familial hypercholesterolemia - at least heterozygote E78.01 omega-3 acid ethyl esters (LOVAZA) 1 g capsule   5. Migraine without aura and without status migrainosus, not intractable G43.009 SUMAtriptan (IMITREX) 50 MG tablet   6. Inflammatory bowel disease -- not definitive of UC vs chrons based on blood work in 2006 K52.9 methylPREDNISolone (MEDROL) 4 MG tablet   7. Hair loss L65.9 finasteride (PROPECIA) 1 MG tablet   8. Erectile dysfunction, unspecified erectile dysfunction type N52.9 sildenafil (VIAGRA) 100 MG tablet     HPI  Patient presents for medication follow-up.  Her graph he has ADHD.  Current dosing of Adderall extended release has been very helpful.  He is working full-time.  Doing well.  No significant issues with tardiness.  He has been able to maintain focus and work performance has improved.  He would like to  continue current dosing.  Refills printed.    Urine drug screen is up-to-date.  He is not on pain medications but does take Adderall regularly.    Familial hyperlipidemia, currently on Lipitor 80 mg daily.  Lovaza.  Tolerating well.  Last lipid panel was much improved.  Needs refills.    Migraine headache, intermittent.  Needs refills of Imitrex.    Inflammatory bowel disease, has been doing well recently but intermittently needs methylprednisolone to help with flareups.  He gets pretty severe cramps and diarrhea.  Needs refills.    Hair loss, persistence, restart finasteride.  Prescription printed.    Erectile dysfunction, long-standing.  States 3 to 4 years has been having issues.  Has been on Viagra intermittently in the past but it was quite expensive.  No generic.  He would like a prescription printed.  He is going to check into online pricing.    Functional Capacity: > 4 METS.   Review of Systems   Constitutional: Negative for chills, fatigue and fever.   HENT: Negative for congestion and hearing loss.    Eyes: Positive for redness and itching. Negative for pain and visual disturbance.        + eye irritation - rubs eyes on his pillow if sleeps very hard. Has eye drops that he uses intermittently   Respiratory: Negative for cough, shortness of breath and wheezing.    Cardiovascular: Negative for chest pain and palpitations.   Gastrointestinal: Negative for abdominal pain, diarrhea, nausea and vomiting.   Endocrine: Negative for cold intolerance and heat intolerance.   Genitourinary: Negative for dysuria and hematuria.   Musculoskeletal: Negative for arthralgias and myalgias.   Skin: Negative for pallor.   Allergic/Immunologic: Positive for environmental allergies and food allergies. Negative for immunocompromised state.   Neurological: Negative for dizziness and light-headedness.   Hematological: Does not bruise/bleed easily.   Psychiatric/Behavioral: Negative for agitation and confusion. The patient is  nervous/anxious.       ANNMARIE:   ANNMARIE-7 SCORE 11/21/2018 3/1/2019 5/24/2019   Total Score 0 0 0     PHQ9:  PHQ-9 SCORE 11/21/2018 3/1/2019 5/24/2019   PHQ-9 Total Score 0 0 0       I have personally reviewed the past medical history, past surgical history, medications, allergies, family and social history as listed below.     Allergies   Allergen Reactions     Cats      Other reaction(s): Runny Nose  Itchy eyes and hand swelling     Food      Other reaction(s): Angioedema  Raw Parsnip AND Raw Carrots     Lisinopril Cough       Current Outpatient Medications   Medication Sig Dispense Refill     amitriptyline (ELAVIL) 25 MG tablet Take 2 tablets (50 mg) by mouth At Bedtime 180 tablet 3     amLODIPine (NORVASC) 5 MG tablet Take 1 tablet (5 mg) by mouth daily 90 tablet 3     [START ON 10/15/2019] amphetamine-dextroamphetamine (ADDERALL XR) 30 MG 24 hr capsule Take 1 capsule (30 mg) by mouth daily 30 capsule 0     [START ON 9/15/2019] amphetamine-dextroamphetamine (ADDERALL XR) 30 MG 24 hr capsule Take 1 capsule (30 mg) by mouth daily 30 capsule 0     amphetamine-dextroamphetamine (ADDERALL XR) 30 MG 24 hr capsule Take 1 capsule (30 mg) by mouth daily 30 capsule 0     atorvastatin (LIPITOR) 80 MG tablet Take 1 tablet (80 mg) by mouth daily 90 tablet 3     budesonide (RINOCORT AQUA) 32 MCG/ACT nasal spray Inhale 1 Spray into both nostrils once daily. 3 Bottle 3     escitalopram (LEXAPRO) 10 MG tablet Take 1 tablet (10 mg) by mouth daily 90 tablet 3     finasteride (PROPECIA) 1 MG tablet Take 1 tablet (1 mg) by mouth daily 90 tablet 3     LORazepam (ATIVAN) 0.5 MG tablet Take 1 tablet (0.5 mg) by mouth 2 times daily as needed for anxiety 60 tablet 2     losartan (COZAAR) 50 MG tablet Take 1 tablet (50 mg) by mouth daily 90 tablet 3     mesalamine (DELZICOL) 400 MG DR capsule Take 2 capsules (800 mg) by mouth 4 times daily as needed 240 capsule 11     methylPREDNISolone (MEDROL) 4 MG tablet TAKE AS DIRECTED FOR 10 DAYS FOR  ULCERATIVE COLITIS FLAIR. REPEAT EVERY 4 WEEKS IF NEEDED. MAXIMUM OF 40 TABLETS MONTHLY 40 tablet 11     omega-3 acid ethyl esters (LOVAZA) 1 g capsule Take 1 capsule (1 g) by mouth 4 times daily 360 capsule 3     omeprazole (PRILOSEC) 20 MG DR capsule Take 1 capsule (20 mg) by mouth daily 30-60 minutes prior to 1st meal of the day - limit use as able 90 capsule 3     scopolamine (TRANSDERM) 1 MG/3DAYS 72 hr patch Place 1 patch onto the skin every 72 hours on dry, clean, hairless skin every 72 hours. For Motion Sickness 10 patch 11     sildenafil (VIAGRA) 100 MG tablet Take 0.5-1 tablets ( mg) by mouth daily as needed 30 tablet 11     SUMAtriptan (IMITREX) 50 MG tablet Take 1 tablet (50 mg) by mouth at onset of headache for migraine 6 tablet 11        Patient Active Problem List    Diagnosis Date Noted     Erectile dysfunction, unspecified erectile dysfunction type 08/16/2019     Priority: Medium     Familial hypercholesterolemia - at least heterozygote 04/18/2018     Priority: Medium     Major depression in complete remission (H) 04/04/2018     Priority: Medium     Anxiety 12/01/2015     Priority: Medium     Attention deficit hyperactivity disorder (ADHD), combined type 12/01/2015     Priority: Medium     Controlled substance agreement signed 5/24/2019 12/01/2015     Priority: Medium     Migraine without aura and without status migrainosus, not intractable 06/19/2015     Priority: Medium     Benign essential hypertension 08/13/2014     Priority: Medium     Motion sickness 08/13/2014     Priority: Medium     Sprain of left thumb 08/13/2014     Priority: Medium     Social phobia 12/19/2013     Priority: Medium     Allergic rhinitis due to cat hair 12/10/2013     Priority: Medium     Dust allergy 12/10/2013     Priority: Medium     Elevated liver enzymes 12/10/2013     Priority: Medium     GERD (gastroesophageal reflux disease) 12/10/2013     Priority: Medium     Hair loss 12/10/2013     Priority: Medium      Lateral epicondylitis 12/10/2013     Priority: Medium     Medial epicondylitis of elbow 12/10/2013     Priority: Medium     Seasonal allergic rhinitis 12/10/2013     Priority: Medium     Sleeping difficulties 12/10/2013     Priority: Medium     Inflammatory bowel disease -- not definitive of UC vs chrons based on blood work in 2006 08/09/2007     Priority: Medium     Past Medical History:   Diagnosis Date     Allergic rhinitis due to animal hair and dander     12/10/2013     Gastro-esophageal reflux disease without esophagitis     12/10/2013     Major depressive disorder, single episode     12/10/2013,Previously followed with Psychiatry - was on Remeron, Adderall, Lexapro in the past. Awaiting to re-establish psychiatry care again.     Migraine without status migrainosus, not intractable     12/10/2013     Other allergic rhinitis     12/10/2013     Other seasonal allergic rhinitis     12/10/2013     Sleep disorder     12/10/2013     Ulcerative colitis without complications (H)     8/9/2007     Past Surgical History:   Procedure Laterality Date     APPENDECTOMY OPEN      2/4/13,Dr. Givens/[facility]     Social History     Socioeconomic History     Marital status: Single     Spouse name: None     Number of children: None     Years of education: None     Highest education level: None   Occupational History     None   Social Needs     Financial resource strain: None     Food insecurity:     Worry: None     Inability: None     Transportation needs:     Medical: None     Non-medical: None   Tobacco Use     Smoking status: Never Smoker     Smokeless tobacco: Never Used   Substance and Sexual Activity     Alcohol use: Yes     Alcohol/week: 0.0 oz     Comment: Alcoholic Drinks/day: ocassionally     Drug use: No     Sexual activity: Never   Lifestyle     Physical activity:     Days per week: None     Minutes per session: None     Stress: None   Relationships     Social connections:     Talks on phone: None     Gets together: None  "    Attends Congregational service: None     Active member of club or organization: None     Attends meetings of clubs or organizations: None     Relationship status: None     Intimate partner violence:     Fear of current or ex partner: None     Emotionally abused: None     Physically abused: None     Forced sexual activity: None   Other Topics Concern     Parent/sibling w/ CABG, MI or angioplasty before 65F 55M? 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    Moved back to [facility] in about November 2012.     For family mental health issues, he notes that an aunt committed suicide in    October 2013 and 3 maternal aunts and his mother are on antidepressants. His    maternal grandmother was diagnosed with schizoaffective disorder, as was a    male cousin. Attention deficit hyperactivity disorder was significant for an    uncle and the client's cousins.     Family History   Problem Relation Age of Onset     Colon Cancer Paternal Grandmother         Cancer-colon     Colon Cancer Maternal Grandmother         Cancer-colon     Other - See Comments Other         Psychiatric illness,Aunt - suicide fall 2013       EXAM:   Vitals:    08/16/19 0829   BP: 128/70   BP Location: Left arm   Patient Position: Sitting   Cuff Size: Adult Regular   Pulse: 66   Resp: 16   Temp: 98.7  F (37.1  C)   TempSrc: Tympanic   SpO2: 99%   Weight: 89 kg (196 lb 3.2 oz)   Height: 1.778 m (5' 10\")       Current Pain Score: No Pain (0)     BP Readings from Last 3 Encounters:   08/16/19 128/70   05/24/19 122/74   03/01/19 128/74      Wt Readings from Last 3 Encounters:   08/16/19 89 kg (196 lb 3.2 oz)   05/24/19 94.4 kg (208 lb 2 oz)   03/01/19 93 kg (205 lb)      Estimated body mass index is 28.15 kg/m  as calculated from the following:    " "Height as of this encounter: 1.778 m (5' 10\").    Weight as of this encounter: 89 kg (196 lb 3.2 oz).     Physical Exam   Constitutional: He appears well-developed and well-nourished. No distress.   HENT:   Head: Normocephalic and atraumatic.   Eyes: Conjunctivae are normal. No scleral icterus.   + minimal right eyelid irritation   Neck: Neck supple.   Cardiovascular: Normal rate and regular rhythm.   Pulmonary/Chest: Effort normal.   Abdominal: Soft. There is no tenderness.   Musculoskeletal: He exhibits no deformity.   Lymphadenopathy:     He has no cervical adenopathy.   Neurological: He is alert.   Skin: Skin is warm and dry. No rash noted. He is not diaphoretic.   Psychiatric: He has a normal mood and affect.        Procedures   INVESTIGATIONS:  Results for orders placed or performed in visit on 05/24/19   Lipid Panel   Result Value Ref Range    Cholesterol 151 <200 mg/dL    Triglycerides 105 <150 mg/dL    HDL Cholesterol 44 23 - 92 mg/dL    LDL Cholesterol Calculated 86 <100 mg/dL    Non HDL Cholesterol 107 <130 mg/dL       ASSESSMENT AND PLAN:  Problem List Items Addressed This Visit        Nervous and Auditory    Migraine without aura and without status migrainosus, not intractable    Relevant Medications    SUMAtriptan (IMITREX) 50 MG tablet       Digestive    Inflammatory bowel disease -- not definitive of UC vs chrons based on blood work in 2006    Relevant Medications    methylPREDNISolone (MEDROL) 4 MG tablet       Endocrine    Familial hypercholesterolemia - at least heterozygote    Relevant Medications    omega-3 acid ethyl esters (LOVAZA) 1 g capsule       Musculoskeletal and Integumentary    Hair loss    Relevant Medications    methylPREDNISolone (MEDROL) 4 MG tablet    finasteride (PROPECIA) 1 MG tablet       Urinary    Erectile dysfunction, unspecified erectile dysfunction type    Relevant Medications    sildenafil (VIAGRA) 100 MG tablet       Behavioral    Attention deficit hyperactivity disorder " (ADHD), combined type - Primary    Relevant Medications    amphetamine-dextroamphetamine (ADDERALL XR) 30 MG 24 hr capsule (Start on 10/15/2019)    amphetamine-dextroamphetamine (ADDERALL XR) 30 MG 24 hr capsule (Start on 9/15/2019)    amphetamine-dextroamphetamine (ADDERALL XR) 30 MG 24 hr capsule    LORazepam (ATIVAN) 0.5 MG tablet       Other    Controlled substance agreement signed 5/24/2019 (Chronic)    Relevant Medications    amphetamine-dextroamphetamine (ADDERALL XR) 30 MG 24 hr capsule (Start on 10/15/2019)    amphetamine-dextroamphetamine (ADDERALL XR) 30 MG 24 hr capsule (Start on 9/15/2019)    amphetamine-dextroamphetamine (ADDERALL XR) 30 MG 24 hr capsule    LORazepam (ATIVAN) 0.5 MG tablet      Other Visit Diagnoses     Long term current use of opiate analgesic        Relevant Orders    Pain Drug Scr UR W Rptd Meds        reviewed diet, exercise and weight control  -- Expected clinical course discussed    -- Medications and their side effects discussed    The 10-year ASCVD risk score (Alto DC Jr., et al., 2013) is: 0.9%    Values used to calculate the score:      Age: 39 years      Sex: Male      Is Non- : No      Diabetic: No      Tobacco smoker: No      Systolic Blood Pressure: 128 mmHg      Is BP treated: Yes      HDL Cholesterol: 44 mg/dL      Total Cholesterol: 151 mg/dL    Patient Instructions   Medications refilled.     Return in approximately 3 months from today, or sooner as needed for follow-up with Dr. Salcido.    - Med Refills - Controlled RX    - Bring your remaining pills / pill bottles with to: Each of your Med Management/refill  appointments for pill counts.   - Urine drug screens for controlled medications will be completed every 3 to 12 months.   - Random pill counts and urine drug testing may occur.   - No illicit drug use or pill sharing will be tolerated.     Clinic : [phone]  Appointment line: [phone] or [phone]       Jarad Salcido  MD  Internal Medicine  [facility]     Portions of this note were dictated using speech recognition software. The note has been proofread but errors in the text may have been overlooked. Please contact me if there are any concerns regarding the accuracy of the dictation.

## 2019-08-16 ENCOUNTER — OFFICE VISIT (OUTPATIENT)
Dept: INTERNAL MEDICINE | Facility: OTHER | Age: 39
End: 2019-08-16
Attending: INTERNAL MEDICINE
Payer: COMMERCIAL

## 2019-08-16 VITALS
WEIGHT: 196.2 LBS | RESPIRATION RATE: 16 BRPM | SYSTOLIC BLOOD PRESSURE: 128 MMHG | TEMPERATURE: 98.7 F | HEART RATE: 66 BPM | OXYGEN SATURATION: 99 % | BODY MASS INDEX: 28.09 KG/M2 | DIASTOLIC BLOOD PRESSURE: 70 MMHG | HEIGHT: 70 IN

## 2019-08-16 DIAGNOSIS — G43.009 MIGRAINE WITHOUT AURA AND WITHOUT STATUS MIGRAINOSUS, NOT INTRACTABLE: ICD-10-CM

## 2019-08-16 DIAGNOSIS — F90.2 ATTENTION DEFICIT HYPERACTIVITY DISORDER (ADHD), COMBINED TYPE: Primary | ICD-10-CM

## 2019-08-16 DIAGNOSIS — L65.9 HAIR LOSS: ICD-10-CM

## 2019-08-16 DIAGNOSIS — Z79.891 LONG TERM CURRENT USE OF OPIATE ANALGESIC: ICD-10-CM

## 2019-08-16 DIAGNOSIS — E78.01 FAMILIAL HYPERCHOLESTEROLEMIA: ICD-10-CM

## 2019-08-16 DIAGNOSIS — K52.9 INFLAMMATORY BOWEL DISEASE: ICD-10-CM

## 2019-08-16 DIAGNOSIS — N52.9 ERECTILE DYSFUNCTION, UNSPECIFIED ERECTILE DYSFUNCTION TYPE: ICD-10-CM

## 2019-08-16 DIAGNOSIS — Z79.899 CONTROLLED SUBSTANCE AGREEMENT SIGNED: ICD-10-CM

## 2019-08-16 PROCEDURE — 99214 OFFICE O/P EST MOD 30 MIN: CPT | Performed by: INTERNAL MEDICINE

## 2019-08-16 PROCEDURE — G0463 HOSPITAL OUTPT CLINIC VISIT: HCPCS

## 2019-08-16 RX ORDER — METHYLPREDNISOLONE 4 MG/1
TABLET ORAL
Qty: 40 TABLET | Refills: 11 | Status: SHIPPED | OUTPATIENT
Start: 2019-08-16 | End: 2020-08-26

## 2019-08-16 RX ORDER — SILDENAFIL 100 MG/1
50-100 TABLET, FILM COATED ORAL DAILY PRN
Qty: 30 TABLET | Refills: 11 | Status: SHIPPED | OUTPATIENT
Start: 2019-08-16 | End: 2020-04-09

## 2019-08-16 RX ORDER — SUMATRIPTAN 50 MG/1
50 TABLET, FILM COATED ORAL
Qty: 6 TABLET | Refills: 11 | Status: SHIPPED | OUTPATIENT
Start: 2019-08-16 | End: 2020-08-26

## 2019-08-16 RX ORDER — FINASTERIDE 1 MG/1
1 TABLET, FILM COATED ORAL DAILY
Qty: 90 TABLET | Refills: 3 | Status: SHIPPED | OUTPATIENT
Start: 2019-08-16 | End: 2020-04-09

## 2019-08-16 RX ORDER — DEXTROAMPHETAMINE SACCHARATE, AMPHETAMINE ASPARTATE MONOHYDRATE, DEXTROAMPHETAMINE SULFATE AND AMPHETAMINE SULFATE 7.5; 7.5; 7.5; 7.5 MG/1; MG/1; MG/1; MG/1
30 CAPSULE, EXTENDED RELEASE ORAL DAILY
Qty: 30 CAPSULE | Refills: 0 | Status: SHIPPED | OUTPATIENT
Start: 2019-08-16 | End: 2019-11-15

## 2019-08-16 RX ORDER — DEXTROAMPHETAMINE SACCHARATE, AMPHETAMINE ASPARTATE MONOHYDRATE, DEXTROAMPHETAMINE SULFATE AND AMPHETAMINE SULFATE 7.5; 7.5; 7.5; 7.5 MG/1; MG/1; MG/1; MG/1
30 CAPSULE, EXTENDED RELEASE ORAL DAILY
Qty: 30 CAPSULE | Refills: 0 | Status: SHIPPED | OUTPATIENT
Start: 2019-09-15 | End: 2019-11-15

## 2019-08-16 RX ORDER — LORAZEPAM 0.5 MG/1
0.5 TABLET ORAL 2 TIMES DAILY PRN
Qty: 60 TABLET | Refills: 2 | Status: SHIPPED | OUTPATIENT
Start: 2019-08-16 | End: 2019-11-15

## 2019-08-16 RX ORDER — OMEGA-3-ACID ETHYL ESTERS 1 G/1
1 CAPSULE, LIQUID FILLED ORAL 4 TIMES DAILY
Qty: 360 CAPSULE | Refills: 3 | Status: SHIPPED | OUTPATIENT
Start: 2019-08-16 | End: 2020-04-09

## 2019-08-16 RX ORDER — DEXTROAMPHETAMINE SACCHARATE, AMPHETAMINE ASPARTATE MONOHYDRATE, DEXTROAMPHETAMINE SULFATE AND AMPHETAMINE SULFATE 7.5; 7.5; 7.5; 7.5 MG/1; MG/1; MG/1; MG/1
30 CAPSULE, EXTENDED RELEASE ORAL DAILY
Qty: 30 CAPSULE | Refills: 0 | Status: SHIPPED | OUTPATIENT
Start: 2019-10-15 | End: 2019-11-15

## 2019-08-16 ASSESSMENT — PAIN SCALES - GENERAL: PAINLEVEL: NO PAIN (0)

## 2019-08-16 ASSESSMENT — ENCOUNTER SYMPTOMS
DIZZINESS: 0
MYALGIAS: 0
VOMITING: 0
FATIGUE: 0
ARTHRALGIAS: 0
ABDOMINAL PAIN: 0
EYE REDNESS: 1
NERVOUS/ANXIOUS: 1
COUGH: 0
EYE PAIN: 0
EYE ITCHING: 1
AGITATION: 0
SHORTNESS OF BREATH: 0
WHEEZING: 0
DIARRHEA: 0
BRUISES/BLEEDS EASILY: 0
CHILLS: 0
DYSURIA: 0
HEMATURIA: 0
PALPITATIONS: 0
FEVER: 0
CONFUSION: 0
LIGHT-HEADEDNESS: 0
NAUSEA: 0

## 2019-08-16 ASSESSMENT — MIFFLIN-ST. JEOR: SCORE: 1806.21

## 2019-08-16 NOTE — NURSING NOTE
Patient presents to the clinic for med refill.     Medication Reconciliation: complete   Enedina Dalton LPN............. August 16, 2019 8:28 AM

## 2019-08-16 NOTE — PATIENT INSTRUCTIONS
Medications refilled.     Return in approximately 3 months from today, or sooner as needed for follow-up with Dr. Salcido.    - Med Refills - Controlled RX    - Bring your remaining pills / pill bottles with to: Each of your Med Management/refill  appointments for pill counts.   - Urine drug screens for controlled medications will be completed every 3 to 12 months.   - Random pill counts and urine drug testing may occur.   - No illicit drug use or pill sharing will be tolerated.     Clinic : [phone]  Appointment line: [phone] or [phone]

## 2019-10-31 NOTE — PROGRESS NOTES
"Nursing Notes:   Mariela Puentes LPN  11/15/2019  9:00 AM  Signed  Patient presents to the clinic for Adderall refill, last administration was today around 0730.  Contract updated on 5-24-19.      Chief Complaint   Patient presents with     Recheck Medication     Imm/Inj     Flu Shot       Initial BP (!) 144/88 (BP Location: Right arm, Patient Position: Sitting, Cuff Size: Adult Regular)   Pulse 92   Temp 97.6  F (36.4  C) (Tympanic)   Resp 20   Wt 92.3 kg (203 lb 6 oz)   BMI 29.18 kg/m    Estimated body mass index is 29.18 kg/m  as calculated from the following:    Height as of 8/16/19: 1.778 m (5' 10\").    Weight as of this encounter: 92.3 kg (203 lb 6 oz).  Medication Reconciliation: complete    Mariela Puentes LPN    Nursing note reviewed with patient.  Accuracy and completeness verified.   Mr. Hess is a 39 year old male who:  Patient presents with:  Recheck Medication  Imm/Inj: Flu Shot      ICD-10-CM    1. Attention deficit hyperactivity disorder (ADHD), combined type F90.2 amphetamine-dextroamphetamine (ADDERALL XR) 30 MG 24 hr capsule     amphetamine-dextroamphetamine (ADDERALL XR) 30 MG 24 hr capsule     amphetamine-dextroamphetamine (ADDERALL XR) 30 MG 24 hr capsule     LORazepam (ATIVAN) 0.5 MG tablet   2. Controlled substance agreement signed 5/24/2019 Z79.899 amphetamine-dextroamphetamine (ADDERALL XR) 30 MG 24 hr capsule     amphetamine-dextroamphetamine (ADDERALL XR) 30 MG 24 hr capsule     amphetamine-dextroamphetamine (ADDERALL XR) 30 MG 24 hr capsule     LORazepam (ATIVAN) 0.5 MG tablet   3. Long term current use of opiate analgesic Z79.891 Pain Drug Scr UR W Rptd Meds   4. Need for prophylactic vaccination and inoculation against influenza Z23 INFLUENZA VACCINE IM > 6 MONTHS VALENT IIV4 [05551]   5. Acute conjunctivitis of both eyes, unspecified acute conjunctivitis type H10.33 sulfacetamide (BLEPH-10) 10 % ophthalmic solution     HPI  Patient presents for medication follow-up " appointment.  He is currently on Adderall and lorazepam.  Controlled substance agreement is up-to-date.  Urine drug screen today.  Proper medication use misuse reviewed.  Patient has been using medications appropriately.  Currently working full-time.  Current medication regimen has allowed full-time work status without loss for activity time.  Has been on current medication regimen for greater than 1 year and doing well.  Prescription refilled x3 months.    Flu shot today.    Intermittent conjunctivitis due to eye irritation or corneal abrasion.  He would like refills of his sulfacetamide ophthalmic solution.    Functional Capacity: > 4 METS.   Review of Systems   Constitutional: Negative for chills, fatigue and fever.   HENT: Negative for congestion and hearing loss.    Eyes: Positive for redness and itching. Negative for pain and visual disturbance.        + eye irritation - rubs eyes on his pillow if sleeps very hard. Has eye drops that he uses intermittently   Respiratory: Negative for cough, shortness of breath and wheezing.    Cardiovascular: Negative for chest pain and palpitations.   Gastrointestinal: Negative for abdominal pain, diarrhea, nausea and vomiting.   Endocrine: Negative for cold intolerance and heat intolerance.   Genitourinary: Negative for dysuria and hematuria.   Musculoskeletal: Negative for arthralgias and myalgias.   Skin: Negative for pallor.   Allergic/Immunologic: Positive for environmental allergies and food allergies. Negative for immunocompromised state.   Neurological: Negative for dizziness and light-headedness.   Hematological: Does not bruise/bleed easily.   Psychiatric/Behavioral: Negative for agitation and confusion. The patient is nervous/anxious.         Problem List/PMH: reviewed in EMR, and made relevant updates today.  Medications: reviewed in EMR, and made relevant updates today.  Allergies: reviewed in EMR, and made relevant updates today.  I reviewed family and social  "history and made relevant updates today.  Social History     Tobacco Use     Smoking status: Never Smoker     Smokeless tobacco: Never Used   Substance Use Topics     Alcohol use: Yes     Alcohol/week: 0.0 standard drinks     Comment: Alcoholic Drinks/day: ocassionally     Drug use: No      Family History   Problem Relation Age of Onset     Colon Cancer Paternal Grandmother         Cancer-colon     Colon Cancer Maternal Grandmother         Cancer-colon     Other - See Comments Other         Psychiatric illness,Aunt - suicide fall 2013       EXAM:   Vitals:    11/15/19 0854   BP: 134/88   BP Location: Right arm   Patient Position: Sitting   Cuff Size: Adult Regular   Pulse: 92   Resp: 20   Temp: 97.6  F (36.4  C)   TempSrc: Tympanic   Weight: 92.3 kg (203 lb 6 oz)       Current Pain Score: Moderate Pain (4)     BP Readings from Last 3 Encounters:   11/15/19 134/88   08/16/19 128/70   05/24/19 122/74      Wt Readings from Last 3 Encounters:   11/15/19 92.3 kg (203 lb 6 oz)   08/16/19 89 kg (196 lb 3.2 oz)   05/24/19 94.4 kg (208 lb 2 oz)      Estimated body mass index is 29.18 kg/m  as calculated from the following:    Height as of 8/16/19: 1.778 m (5' 10\").    Weight as of this encounter: 92.3 kg (203 lb 6 oz).     Physical Exam  Constitutional:       General: He is not in acute distress.     Appearance: He is well-developed. He is not diaphoretic.   HENT:      Head: Normocephalic and atraumatic.   Eyes:      General: No scleral icterus.     Conjunctiva/sclera: Conjunctivae normal.   Neck:      Musculoskeletal: Neck supple.   Cardiovascular:      Rate and Rhythm: Normal rate and regular rhythm.   Pulmonary:      Effort: Pulmonary effort is normal.      Breath sounds: Normal breath sounds.   Abdominal:      Palpations: Abdomen is soft.      Tenderness: There is no abdominal tenderness.   Musculoskeletal:         General: No deformity.   Lymphadenopathy:      Cervical: No cervical adenopathy.   Skin:     General: Skin " is warm and dry.      Findings: No rash.   Neurological:      General: No focal deficit present.      Mental Status: He is alert.   Psychiatric:         Mood and Affect: Mood normal.         Behavior: Behavior normal.          Procedures   INVESTIGATIONS:  No results found for any visits on 11/15/19.    ASSESSMENT AND PLAN:  Problem List Items Addressed This Visit        Behavioral    Attention deficit hyperactivity disorder (ADHD), combined type - Primary    Relevant Medications    amphetamine-dextroamphetamine (ADDERALL XR) 30 MG 24 hr capsule (Start on 1/10/2020)    amphetamine-dextroamphetamine (ADDERALL XR) 30 MG 24 hr capsule (Start on 12/13/2019)    amphetamine-dextroamphetamine (ADDERALL XR) 30 MG 24 hr capsule    LORazepam (ATIVAN) 0.5 MG tablet       Other    Controlled substance agreement signed 5/24/2019 (Chronic)    Relevant Medications    amphetamine-dextroamphetamine (ADDERALL XR) 30 MG 24 hr capsule (Start on 1/10/2020)    amphetamine-dextroamphetamine (ADDERALL XR) 30 MG 24 hr capsule (Start on 12/13/2019)    amphetamine-dextroamphetamine (ADDERALL XR) 30 MG 24 hr capsule    LORazepam (ATIVAN) 0.5 MG tablet      Other Visit Diagnoses     Long term current use of opiate analgesic        Relevant Orders    Pain Drug Scr UR W Rptd Meds    Need for prophylactic vaccination and inoculation against influenza        Relevant Orders    INFLUENZA VACCINE IM > 6 MONTHS VALENT IIV4 [59510] (Completed)    Acute conjunctivitis of both eyes, unspecified acute conjunctivitis type        Relevant Medications    sulfacetamide (BLEPH-10) 10 % ophthalmic solution        reviewed diet, exercise and weight control, recommended sodium restriction  -- Expected clinical course discussed    -- Medications and their side effects discussed    The 10-year ASCVD risk score (Chetariq TREJO Jr., et al., 2013) is: 1%    Values used to calculate the score:      Age: 39 years      Sex: Male      Is Non- : No       Diabetic: No      Tobacco smoker: No      Systolic Blood Pressure: 134 mmHg      Is BP treated: Yes      HDL Cholesterol: 44 mg/dL      Total Cholesterol: 151 mg/dL    Patient Instructions   Medications refilled.   UTOX today.     Return in approximately 12 weeks from today, or sooner as needed for follow-up with Dr. Salcido.    - Med Refills - Controlled RX    - Bring your remaining pills / pill bottles with to: Each of your Med Management/refill  appointments for pill counts.   - Urine drug screens for controlled medications will be completed every 3 to 12 months.   - Random pill counts and urine drug testing may occur.   - No illicit drug use or pill sharing will be tolerated.     Clinic : [phone]  Appointment line: [phone]      Jarad Salcido MD  Internal Medicine  [facility]     Portions of this note were dictated using speech recognition software. The note has been proofread but errors in the text may have been overlooked. Please contact me if there are any concerns regarding the accuracy of the dictation.

## 2019-11-15 ENCOUNTER — OFFICE VISIT (OUTPATIENT)
Dept: INTERNAL MEDICINE | Facility: OTHER | Age: 39
End: 2019-11-15
Attending: INTERNAL MEDICINE
Payer: COMMERCIAL

## 2019-11-15 VITALS
SYSTOLIC BLOOD PRESSURE: 134 MMHG | RESPIRATION RATE: 20 BRPM | WEIGHT: 203.38 LBS | DIASTOLIC BLOOD PRESSURE: 88 MMHG | BODY MASS INDEX: 29.18 KG/M2 | TEMPERATURE: 97.6 F | HEART RATE: 92 BPM

## 2019-11-15 DIAGNOSIS — Z23 NEED FOR PROPHYLACTIC VACCINATION AND INOCULATION AGAINST INFLUENZA: ICD-10-CM

## 2019-11-15 DIAGNOSIS — H10.33 ACUTE CONJUNCTIVITIS OF BOTH EYES, UNSPECIFIED ACUTE CONJUNCTIVITIS TYPE: ICD-10-CM

## 2019-11-15 DIAGNOSIS — F90.2 ATTENTION DEFICIT HYPERACTIVITY DISORDER (ADHD), COMBINED TYPE: Primary | ICD-10-CM

## 2019-11-15 DIAGNOSIS — Z79.899 CONTROLLED SUBSTANCE AGREEMENT SIGNED: ICD-10-CM

## 2019-11-15 DIAGNOSIS — Z79.891 LONG TERM CURRENT USE OF OPIATE ANALGESIC: ICD-10-CM

## 2019-11-15 PROCEDURE — 99214 OFFICE O/P EST MOD 30 MIN: CPT | Performed by: INTERNAL MEDICINE

## 2019-11-15 PROCEDURE — G0463 HOSPITAL OUTPT CLINIC VISIT: HCPCS

## 2019-11-15 PROCEDURE — 90686 IIV4 VACC NO PRSV 0.5 ML IM: CPT

## 2019-11-15 PROCEDURE — G0463 HOSPITAL OUTPT CLINIC VISIT: HCPCS | Mod: 25

## 2019-11-15 PROCEDURE — 80307 DRUG TEST PRSMV CHEM ANLYZR: CPT | Mod: ZL | Performed by: INTERNAL MEDICINE

## 2019-11-15 PROCEDURE — 90471 IMMUNIZATION ADMIN: CPT

## 2019-11-15 PROCEDURE — G0008 ADMIN INFLUENZA VIRUS VAC: HCPCS

## 2019-11-15 RX ORDER — SULFACETAMIDE SODIUM 100 MG/ML
1 SOLUTION/ DROPS OPHTHALMIC
Qty: 15 ML | Refills: 3 | Status: SHIPPED | OUTPATIENT
Start: 2019-11-15 | End: 2020-11-18

## 2019-11-15 RX ORDER — DEXTROAMPHETAMINE SACCHARATE, AMPHETAMINE ASPARTATE MONOHYDRATE, DEXTROAMPHETAMINE SULFATE AND AMPHETAMINE SULFATE 7.5; 7.5; 7.5; 7.5 MG/1; MG/1; MG/1; MG/1
30 CAPSULE, EXTENDED RELEASE ORAL DAILY
Qty: 28 CAPSULE | Refills: 0 | Status: SHIPPED | OUTPATIENT
Start: 2020-01-10 | End: 2020-04-09

## 2019-11-15 RX ORDER — DEXTROAMPHETAMINE SACCHARATE, AMPHETAMINE ASPARTATE MONOHYDRATE, DEXTROAMPHETAMINE SULFATE AND AMPHETAMINE SULFATE 7.5; 7.5; 7.5; 7.5 MG/1; MG/1; MG/1; MG/1
30 CAPSULE, EXTENDED RELEASE ORAL DAILY
Qty: 28 CAPSULE | Refills: 0 | Status: SHIPPED | OUTPATIENT
Start: 2019-12-13 | End: 2020-07-14

## 2019-11-15 RX ORDER — LORAZEPAM 0.5 MG/1
0.5 TABLET ORAL 2 TIMES DAILY PRN
Qty: 56 TABLET | Refills: 2 | Status: SHIPPED | OUTPATIENT
Start: 2019-11-15 | End: 2020-04-09

## 2019-11-15 RX ORDER — DEXTROAMPHETAMINE SACCHARATE, AMPHETAMINE ASPARTATE MONOHYDRATE, DEXTROAMPHETAMINE SULFATE AND AMPHETAMINE SULFATE 7.5; 7.5; 7.5; 7.5 MG/1; MG/1; MG/1; MG/1
30 CAPSULE, EXTENDED RELEASE ORAL DAILY
Qty: 28 CAPSULE | Refills: 0 | Status: SHIPPED | OUTPATIENT
Start: 2019-11-15 | End: 2020-07-14

## 2019-11-15 ASSESSMENT — ENCOUNTER SYMPTOMS
PALPITATIONS: 0
CONFUSION: 0
COUGH: 0
ARTHRALGIAS: 0
ABDOMINAL PAIN: 0
DYSURIA: 0
FEVER: 0
DIARRHEA: 0
HEMATURIA: 0
NERVOUS/ANXIOUS: 1
SHORTNESS OF BREATH: 0
AGITATION: 0
LIGHT-HEADEDNESS: 0
WHEEZING: 0
EYE REDNESS: 1
MYALGIAS: 0
NAUSEA: 0
CHILLS: 0
EYE PAIN: 0
VOMITING: 0
FATIGUE: 0
DIZZINESS: 0
BRUISES/BLEEDS EASILY: 0
EYE ITCHING: 1

## 2019-11-15 ASSESSMENT — ANXIETY QUESTIONNAIRES
6. BECOMING EASILY ANNOYED OR IRRITABLE: NOT AT ALL
2. NOT BEING ABLE TO STOP OR CONTROL WORRYING: NOT AT ALL
3. WORRYING TOO MUCH ABOUT DIFFERENT THINGS: NOT AT ALL
IF YOU CHECKED OFF ANY PROBLEMS ON THIS QUESTIONNAIRE, HOW DIFFICULT HAVE THESE PROBLEMS MADE IT FOR YOU TO DO YOUR WORK, TAKE CARE OF THINGS AT HOME, OR GET ALONG WITH OTHER PEOPLE: NOT DIFFICULT AT ALL
GAD7 TOTAL SCORE: 0
5. BEING SO RESTLESS THAT IT IS HARD TO SIT STILL: NOT AT ALL
7. FEELING AFRAID AS IF SOMETHING AWFUL MIGHT HAPPEN: NOT AT ALL
1. FEELING NERVOUS, ANXIOUS, OR ON EDGE: NOT AT ALL

## 2019-11-15 ASSESSMENT — PATIENT HEALTH QUESTIONNAIRE - PHQ9
5. POOR APPETITE OR OVEREATING: NOT AT ALL
SUM OF ALL RESPONSES TO PHQ QUESTIONS 1-9: 0

## 2019-11-15 ASSESSMENT — PAIN SCALES - GENERAL: PAINLEVEL: MODERATE PAIN (4)

## 2019-11-15 NOTE — NURSING NOTE
Immunization Documentation    Prior to Immunization administration, verified patients identity using patient's name and date of birth. Please see IMMUNIZATIONS  and order for additional information.  Patient / Parent instructed to remain in clinic for 15 minutes and report any adverse reaction to staff immediately.    Was entire vial of medication used? Yes  Vial/Syringe: Sebastien Puentes LPN  11/15/2019   11:29 AM

## 2019-11-15 NOTE — PATIENT INSTRUCTIONS
Medications refilled.   UTOX today.     Return in approximately 12 weeks from today, or sooner as needed for follow-up with Dr. Salcido.    - Med Refills - Controlled RX    - Bring your remaining pills / pill bottles with to: Each of your Med Management/refill  appointments for pill counts.   - Urine drug screens for controlled medications will be completed every 3 to 12 months.   - Random pill counts and urine drug testing may occur.   - No illicit drug use or pill sharing will be tolerated.     Clinic : [phone]  Appointment line: [phone]

## 2019-11-15 NOTE — NURSING NOTE
"Patient presents to the clinic for Adderall refill, last administration was today around 0730.  Contract updated on 5-24-19.      Chief Complaint   Patient presents with     Recheck Medication     Imm/Inj     Flu Shot       Initial BP (!) 144/88 (BP Location: Right arm, Patient Position: Sitting, Cuff Size: Adult Regular)   Pulse 92   Temp 97.6  F (36.4  C) (Tympanic)   Resp 20   Wt 92.3 kg (203 lb 6 oz)   BMI 29.18 kg/m   Estimated body mass index is 29.18 kg/m  as calculated from the following:    Height as of 8/16/19: 1.778 m (5' 10\").    Weight as of this encounter: 92.3 kg (203 lb 6 oz).  Medication Reconciliation: complete    Mariela Puentes LPN    "

## 2019-11-16 ASSESSMENT — ANXIETY QUESTIONNAIRES: GAD7 TOTAL SCORE: 0

## 2019-11-22 LAB — PAIN DRUG SCR UR W RPTD MEDS: NORMAL

## 2020-03-31 DIAGNOSIS — F90.2 ATTENTION DEFICIT HYPERACTIVITY DISORDER (ADHD), COMBINED TYPE: ICD-10-CM

## 2020-03-31 DIAGNOSIS — Z79.899 CONTROLLED SUBSTANCE AGREEMENT SIGNED: ICD-10-CM

## 2020-03-31 DIAGNOSIS — I10 BENIGN ESSENTIAL HYPERTENSION: ICD-10-CM

## 2020-03-31 DIAGNOSIS — K52.9 INFLAMMATORY BOWEL DISEASE: ICD-10-CM

## 2020-03-31 RX ORDER — MESALAMINE 400 MG/1
CAPSULE, DELAYED RELEASE ORAL
Qty: 240 CAPSULE | Refills: 11 | OUTPATIENT
Start: 2020-03-31

## 2020-03-31 RX ORDER — AMLODIPINE BESYLATE 5 MG/1
5 TABLET ORAL DAILY
Qty: 90 TABLET | Refills: 3 | OUTPATIENT
Start: 2020-03-31

## 2020-03-31 RX ORDER — LORAZEPAM 0.5 MG/1
TABLET ORAL
Qty: 56 TABLET | Refills: 2 | OUTPATIENT
Start: 2020-03-31

## 2020-03-31 NOTE — TELEPHONE ENCOUNTER
[facility] sent Rx request for the following:      Delzicol 400 mg capsule (DR tablets inside)   Sig: Take 2 capsules (800 mg) by mouth 4 times daily as needed       amlodipine 5 mg tablet   Sig: Take 1 tablet (5 mg) by mouth daily       lorazepam 0.5 mg tablet  Sig: TAKE 1 TABLET BY MOUTH 2 TIMES DAILY AS NEEDED FOR ANXIETY         Prescription refused.  This is a duplicate request.  Celso Luna RN.......3/31/2020 2:36 PM

## 2020-04-01 ENCOUNTER — TELEPHONE (OUTPATIENT)
Dept: INTERNAL MEDICINE | Facility: OTHER | Age: 40
End: 2020-04-01

## 2020-04-01 DIAGNOSIS — J30.81 ALLERGIC RHINITIS DUE TO CAT HAIR: Primary | ICD-10-CM

## 2020-04-01 RX ORDER — FLUTICASONE PROPIONATE 50 MCG
1 SPRAY, SUSPENSION (ML) NASAL DAILY
Qty: 16 G | Refills: 11 | Status: SHIPPED | OUTPATIENT
Start: 2020-04-01 | End: 2020-04-01 | Stop reason: ALTCHOICE

## 2020-04-01 NOTE — TELEPHONE ENCOUNTER
[facility] will not cover nor PA budesonide nasal. Changing to fluticasone nasal per Collaborative Practice Agreement

## 2020-04-01 NOTE — TELEPHONE ENCOUNTER
Pt has [employer] as of today which covered budesonide nasal. Sending new Rx per CPA.    Geoff Bonds, PharmD  [facility]

## 2020-04-09 ENCOUNTER — VIRTUAL VISIT (OUTPATIENT)
Dept: INTERNAL MEDICINE | Facility: OTHER | Age: 40
End: 2020-04-09
Attending: INTERNAL MEDICINE
Payer: COMMERCIAL

## 2020-04-09 VITALS — HEIGHT: 70 IN | BODY MASS INDEX: 30.64 KG/M2 | WEIGHT: 214 LBS

## 2020-04-09 DIAGNOSIS — F90.2 ATTENTION DEFICIT HYPERACTIVITY DISORDER (ADHD), COMBINED TYPE: ICD-10-CM

## 2020-04-09 DIAGNOSIS — E78.01 FAMILIAL HYPERCHOLESTEROLEMIA: ICD-10-CM

## 2020-04-09 DIAGNOSIS — N52.9 ERECTILE DYSFUNCTION, UNSPECIFIED ERECTILE DYSFUNCTION TYPE: ICD-10-CM

## 2020-04-09 DIAGNOSIS — L65.9 HAIR LOSS: ICD-10-CM

## 2020-04-09 DIAGNOSIS — Z79.899 CONTROLLED SUBSTANCE AGREEMENT SIGNED: ICD-10-CM

## 2020-04-09 PROCEDURE — 99442 ZZC PHYSICIAN TELEPHONE EVALUATION 11-20 MIN: CPT | Performed by: INTERNAL MEDICINE

## 2020-04-09 RX ORDER — DEXTROAMPHETAMINE SACCHARATE, AMPHETAMINE ASPARTATE MONOHYDRATE, DEXTROAMPHETAMINE SULFATE AND AMPHETAMINE SULFATE 7.5; 7.5; 7.5; 7.5 MG/1; MG/1; MG/1; MG/1
30 CAPSULE, EXTENDED RELEASE ORAL DAILY
Qty: 30 CAPSULE | Refills: 0 | Status: SHIPPED | OUTPATIENT
Start: 2020-04-09 | End: 2020-07-14

## 2020-04-09 RX ORDER — DEXTROAMPHETAMINE SACCHARATE, AMPHETAMINE ASPARTATE MONOHYDRATE, DEXTROAMPHETAMINE SULFATE AND AMPHETAMINE SULFATE 7.5; 7.5; 7.5; 7.5 MG/1; MG/1; MG/1; MG/1
30 CAPSULE, EXTENDED RELEASE ORAL DAILY
Qty: 30 CAPSULE | Refills: 0 | Status: SHIPPED | OUTPATIENT
Start: 2020-06-04 | End: 2020-07-14

## 2020-04-09 RX ORDER — SILDENAFIL 100 MG/1
50-100 TABLET, FILM COATED ORAL DAILY PRN
Qty: 30 TABLET | Refills: 3 | Status: SHIPPED | OUTPATIENT
Start: 2020-04-09 | End: 2020-07-14

## 2020-04-09 RX ORDER — OMEGA-3-ACID ETHYL ESTERS 1 G/1
1 CAPSULE, LIQUID FILLED ORAL 4 TIMES DAILY
Qty: 360 CAPSULE | Refills: 3 | Status: SHIPPED | OUTPATIENT
Start: 2020-04-09 | End: 2020-11-18

## 2020-04-09 RX ORDER — DEXTROAMPHETAMINE SACCHARATE, AMPHETAMINE ASPARTATE MONOHYDRATE, DEXTROAMPHETAMINE SULFATE AND AMPHETAMINE SULFATE 7.5; 7.5; 7.5; 7.5 MG/1; MG/1; MG/1; MG/1
30 CAPSULE, EXTENDED RELEASE ORAL DAILY
Qty: 30 CAPSULE | Refills: 0 | Status: SHIPPED | OUTPATIENT
Start: 2020-05-07 | End: 2020-07-14

## 2020-04-09 RX ORDER — LORAZEPAM 0.5 MG/1
0.5 TABLET ORAL 2 TIMES DAILY PRN
Qty: 60 TABLET | Refills: 2 | Status: SHIPPED | OUTPATIENT
Start: 2020-04-09 | End: 2020-07-14

## 2020-04-09 RX ORDER — FINASTERIDE 1 MG/1
1 TABLET, FILM COATED ORAL DAILY
Qty: 90 TABLET | Refills: 3 | Status: SHIPPED | OUTPATIENT
Start: 2020-04-09 | End: 2020-07-14

## 2020-04-09 ASSESSMENT — PAIN SCALES - GENERAL: PAINLEVEL: NO PAIN (0)

## 2020-04-09 ASSESSMENT — MIFFLIN-ST. JEOR: SCORE: 1886.95

## 2020-04-09 ASSESSMENT — ENCOUNTER SYMPTOMS: ROS SKIN COMMENTS: HAIR LOSS

## 2020-04-09 NOTE — PROGRESS NOTES
"Subjective     Kike Hess is a 39 year old male who is being evaluated via a billable telephone visit.      The patient has been notified of following:     \"This telephone visit will be conducted via a call between you and your physician/provider. We have found that certain health care needs can be provided without the need for a physical exam.  This service lets us provide the care you need with a short phone conversation.  If a prescription is necessary we can send it directly to your pharmacy.  If lab work is needed we can place an order for that and you can then stop by our lab to have the test done at a later time.    Telephone visits are billed at different rates depending on your insurance coverage. During this emergency period, for some insurers they may be billed the same as an in-person visit.  Please reach out to your insurance provider with any questions.    If during the course of the call the physician/provider feels a telephone visit is not appropriate, you will not be charged for this service.\"    Patient has given verbal consent for Telephone visit?  Yes    How would you like to obtain your AVS? Mail a copy    Kike Hess complains of   Chief Complaint   Patient presents with     Medication Request     refills       ALLERGIES  Cats; Food; and Lisinopril    Reviewed and updated as needed this visit by Provider  Tobacco  Allergies  Meds  Problems  Med Hx  Surg Hx  Fam Hx         Review of Systems   Genitourinary: Positive for impotence (intermittent).   Skin:        Hair loss   Psychiatric/Behavioral:        ADHD - much better with adderall. Able to work full time.   All other systems reviewed and are negative.          Objective   Reported vitals:  Ht 1.778 m (5' 10\")   Wt 97.1 kg (214 lb)   BMI 30.71 kg/m     healthy, alert and no distress  Psych: Alert and oriented times 3; coherent speech, normal   rate and volume, able to articulate logical thoughts, able   to abstract " reason, no tangential thoughts, no hallucinations   or delusions  His affect is good.      Diagnostic Test Results:  Labs reviewed in Epic        Assessment/Plan:    ICD-10-CM    1. Hair loss  L65.9 finasteride (PROPECIA) 1 MG tablet   2. Familial hypercholesterolemia - at least heterozygote  E78.01 omega-3 acid ethyl esters (LOVAZA) 1 g capsule   3. Erectile dysfunction, unspecified erectile dysfunction type  N52.9 sildenafil (VIAGRA) 100 MG tablet   4. Attention deficit hyperactivity disorder (ADHD), combined type  F90.2 amphetamine-dextroamphetamine (ADDERALL XR) 30 MG 24 hr capsule     LORazepam (ATIVAN) 0.5 MG tablet     amphetamine-dextroamphetamine (ADDERALL XR) 30 MG 24 hr capsule     amphetamine-dextroamphetamine (ADDERALL XR) 30 MG 24 hr capsule   5. Controlled substance agreement signed 5/24/2019  Z79.899 amphetamine-dextroamphetamine (ADDERALL XR) 30 MG 24 hr capsule     LORazepam (ATIVAN) 0.5 MG tablet     amphetamine-dextroamphetamine (ADDERALL XR) 30 MG 24 hr capsule     amphetamine-dextroamphetamine (ADDERALL XR) 30 MG 24 hr capsule      Patient contacted via telephone appointment due to the virus pandemic.  States that he would like prescriptions for finasteride and Viagra printed so he can check around on cost and coverage.    He has familial hypercholesterolemia.  Cholesterol levels were quite elevated.  Continues with Lovaza.  Needs to get updated prescription today.    Erectile dysfunction, still intermittently a problem.  Uses Viagra intermittently.  He would like to shop online or at different pharmacies for cost and coverage of this.    Attention deficit disorder, has been a chronic ongoing issue.  Continues to use lorazepam as well as Adderall for anxiety.  This is been helpful.  Has been working full-time.  Has been doing well with current medication regimen.  No  opioid use.  Controlled substance agreement  is up-to-date.  Prescriptions refilled times 28 days x 3 prescriptions.  Plan for  follow-up in about 3 months or sooner as needed.     Return in about 3 months (around 7/9/2020) for -Video/Telephone Visit - Med Refills Controlled RX.      Phone call duration:  15:43 minutes    Jarad Salcido MD

## 2020-07-07 DIAGNOSIS — Z79.899 CONTROLLED SUBSTANCE AGREEMENT SIGNED: ICD-10-CM

## 2020-07-07 DIAGNOSIS — F90.2 ATTENTION DEFICIT HYPERACTIVITY DISORDER (ADHD), COMBINED TYPE: ICD-10-CM

## 2020-07-09 NOTE — TELEPHONE ENCOUNTER
Routing refill request to provider for review/approval because:  Drug not on the [facility] refill protocol     LOV; 4/9/2020  Patient due for med review.  Unsure when can work patient in   Grecia Harris RN on 7/9/2020 at 11:33 AM

## 2020-07-13 ENCOUNTER — TELEPHONE (OUTPATIENT)
Dept: INTERNAL MEDICINE | Facility: OTHER | Age: 40
End: 2020-07-13

## 2020-07-13 RX ORDER — LORAZEPAM 0.5 MG/1
0.5 TABLET ORAL 2 TIMES DAILY PRN
Qty: 60 TABLET | Refills: 2 | OUTPATIENT
Start: 2020-07-13

## 2020-07-13 NOTE — TELEPHONE ENCOUNTER
Called to schedule an appointment for med refill with any provider available per DJS. No answer, No voicemail.    Pam Hope on 7/13/2020 at 1:22 PM

## 2020-07-13 NOTE — TELEPHONE ENCOUNTER
Called and informed patient of Dr. Salcido response and verbalized understanding.  Patient transferred to scheduling.  Grecia Harris RN on 7/13/2020 at 7:56 AM

## 2020-07-13 NOTE — TELEPHONE ENCOUNTER
Reason for call: Patient wanting a work in appointment.    Patient is having the following symptoms:  Medication Renewal for 4 week    The patient is requesting an appointment with  DJS    Was an appointment offered for this call? Yes    If Yes, what is the date of the appointment? 8/26/2020 @3:40    Preferred method for responding to this message: Telephone Call    Phone number patient can be reached at? Cell number on file:    Telephone Information:   Mobile [phone]       If we can't reach you directly, may we leave a detailed response at the number you provided? Unknown, patient hung up    Can this message wait until your PCP/provider returns if unavailable today? N/A

## 2020-07-13 NOTE — TELEPHONE ENCOUNTER
Patient reports having 3 days of Ativan left and 1 week of Adderall left.      Mariela Davila LPN 7/13/2020 10:03 AM

## 2020-07-13 NOTE — TELEPHONE ENCOUNTER
Cell number rang and did not go through to patient.  Unable to leave a message and unable to confirm how much medications patient has left. If patient filled prescriptions on the start dates, patient would be out.      Mariela Davila LPN 7/13/2020 9:19 AM

## 2020-07-13 NOTE — TELEPHONE ENCOUNTER
Patient has appointment with me on 8/26.  He will probably need to see a different provider this month to get his meds reviewed and refilled --unless there are cancellations.    Jarad Salcido MD

## 2020-07-14 ENCOUNTER — OFFICE VISIT (OUTPATIENT)
Dept: INTERNAL MEDICINE | Facility: OTHER | Age: 40
End: 2020-07-14
Attending: INTERNAL MEDICINE
Payer: COMMERCIAL

## 2020-07-14 VITALS
RESPIRATION RATE: 18 BRPM | HEART RATE: 116 BPM | DIASTOLIC BLOOD PRESSURE: 98 MMHG | TEMPERATURE: 98.5 F | OXYGEN SATURATION: 95 % | SYSTOLIC BLOOD PRESSURE: 136 MMHG | WEIGHT: 210 LBS | BODY MASS INDEX: 30.13 KG/M2

## 2020-07-14 DIAGNOSIS — R74.8 ELEVATED LIVER ENZYMES: Primary | ICD-10-CM

## 2020-07-14 DIAGNOSIS — N52.9 ERECTILE DYSFUNCTION, UNSPECIFIED ERECTILE DYSFUNCTION TYPE: ICD-10-CM

## 2020-07-14 DIAGNOSIS — L65.9 HAIR LOSS: ICD-10-CM

## 2020-07-14 DIAGNOSIS — R94.4 DECREASED GFR: ICD-10-CM

## 2020-07-14 DIAGNOSIS — Z79.899 CONTROLLED SUBSTANCE AGREEMENT SIGNED: ICD-10-CM

## 2020-07-14 DIAGNOSIS — F90.2 ATTENTION DEFICIT HYPERACTIVITY DISORDER (ADHD), COMBINED TYPE: ICD-10-CM

## 2020-07-14 DIAGNOSIS — T75.3XXD MOTION SICKNESS, SUBSEQUENT ENCOUNTER: ICD-10-CM

## 2020-07-14 LAB
ALBUMIN SERPL-MCNC: 4.7 G/DL (ref 3.5–5.7)
ALP SERPL-CCNC: 71 U/L (ref 34–104)
ALT SERPL W P-5'-P-CCNC: 118 U/L (ref 7–52)
ANION GAP SERPL CALCULATED.3IONS-SCNC: 11 MMOL/L (ref 6–17)
AST SERPL W P-5'-P-CCNC: ABNORMAL U/L (ref 13–39)
BILIRUB SERPL-MCNC: 0.7 MG/DL (ref 0.3–1)
BUN SERPL-MCNC: 18 MG/DL (ref 7–25)
CALCIUM SERPL-MCNC: 10.2 MG/DL (ref 8.6–10.3)
CHLORIDE SERPL-SCNC: 101 MMOL/L (ref 98–107)
CO2 SERPL-SCNC: 25 MMOL/L (ref 21–31)
CREAT SERPL-MCNC: 0.98 MG/DL (ref 0.7–1.3)
GFR SERPL CREATININE-BSD FRML MDRD: 84 ML/MIN/{1.73_M2}
GLUCOSE SERPL-MCNC: 167 MG/DL (ref 70–105)
POTASSIUM SERPL-SCNC: 3.8 MMOL/L (ref 3.5–5.1)
PROT SERPL-MCNC: 7 G/DL (ref 6.4–8.9)
SODIUM SERPL-SCNC: 137 MMOL/L (ref 134–144)

## 2020-07-14 PROCEDURE — 36415 COLL VENOUS BLD VENIPUNCTURE: CPT | Mod: ZL | Performed by: INTERNAL MEDICINE

## 2020-07-14 PROCEDURE — G0463 HOSPITAL OUTPT CLINIC VISIT: HCPCS

## 2020-07-14 PROCEDURE — 99214 OFFICE O/P EST MOD 30 MIN: CPT | Performed by: INTERNAL MEDICINE

## 2020-07-14 PROCEDURE — 80053 COMPREHEN METABOLIC PANEL: CPT | Mod: ZL | Performed by: INTERNAL MEDICINE

## 2020-07-14 RX ORDER — LORAZEPAM 0.5 MG/1
0.5 TABLET ORAL 2 TIMES DAILY PRN
Qty: 60 TABLET | Refills: 1 | Status: SHIPPED | OUTPATIENT
Start: 2020-07-14 | End: 2020-08-26

## 2020-07-14 RX ORDER — DEXTROAMPHETAMINE SACCHARATE, AMPHETAMINE ASPARTATE MONOHYDRATE, DEXTROAMPHETAMINE SULFATE AND AMPHETAMINE SULFATE 7.5; 7.5; 7.5; 7.5 MG/1; MG/1; MG/1; MG/1
30 CAPSULE, EXTENDED RELEASE ORAL DAILY
Qty: 30 CAPSULE | Refills: 0 | Status: SHIPPED | OUTPATIENT
Start: 2020-07-14 | End: 2020-07-14

## 2020-07-14 RX ORDER — DEXTROAMPHETAMINE SACCHARATE, AMPHETAMINE ASPARTATE MONOHYDRATE, DEXTROAMPHETAMINE SULFATE AND AMPHETAMINE SULFATE 7.5; 7.5; 7.5; 7.5 MG/1; MG/1; MG/1; MG/1
30 CAPSULE, EXTENDED RELEASE ORAL DAILY
Qty: 30 CAPSULE | Refills: 0 | Status: SHIPPED | OUTPATIENT
Start: 2020-08-18 | End: 2020-08-26

## 2020-07-14 RX ORDER — FINASTERIDE 1 MG/1
1 TABLET, FILM COATED ORAL DAILY
Qty: 90 TABLET | Refills: 0 | Status: SHIPPED | OUTPATIENT
Start: 2020-07-14 | End: 2020-11-18

## 2020-07-14 RX ORDER — SILDENAFIL 100 MG/1
50-100 TABLET, FILM COATED ORAL DAILY PRN
Qty: 30 TABLET | Refills: 0 | Status: SHIPPED | OUTPATIENT
Start: 2020-07-14 | End: 2020-11-18

## 2020-07-14 ASSESSMENT — PAIN SCALES - GENERAL: PAINLEVEL: NO PAIN (0)

## 2020-07-14 NOTE — TELEPHONE ENCOUNTER
Can probably see another provider for med refills prior to his appointment with me in 6 weeks, so he doesn't run out of medications.     Jarad Salcido MD

## 2020-07-14 NOTE — NURSING NOTE
Patient presents to clinic today for refill on Ativan and Adderall. His PCP wasn't available this week and he will be out soon. He has an appointment on 8/26/20 with Dr Salcido.    Medication reconciliation completed.  Angi Wong CMA([facility])..................7/14/2020   11:35 AM

## 2020-07-14 NOTE — PROGRESS NOTES
Chief Complaint   Patient presents with     Recheck Medication     refill - temporarily         HPI: Mr. Hess is a 40 year old male who presents today for follow up of medications.    He has a history of ADHD.  He has been on Adderall ongoing.  His primary care provider is not available today.  He feels that he has improvement in his social anxiety when he is on Adderall.  He denies any obvious side effects.  He is up-to-date on a drug screen.  His pain contract is up-to-date with his primary care provider.    He also has a history of anxiety.  He continues to use Ativan for this in addition to Lexapro and amitriptyline.  He denies any issues with the medications.  He typically takes this twice daily.    In reviewing his past labs he is noted to have a decrease in his kidney function.  He also was noted to have elevated liver enzymes.  He has found that these improved after he was put on a statin as he had previous issues with hypertriglyceridemia.  He reports that he has not had any alcohol in the last week.    He is requesting a paper prescription for his finasteride and his Viagra.  He denies any side effects from these medications.  He does like to get him with a coupon at the pharmacy.    He  reports that he has never smoked. He has never used smokeless tobacco.    Past medical history reviewed as below:     Past Medical History:   Diagnosis Date     Allergic rhinitis due to animal hair and dander     12/10/2013     Gastro-esophageal reflux disease without esophagitis     12/10/2013     Major depressive disorder, single episode     12/10/2013,Previously followed with Psychiatry - was on Remeron, Adderall, Lexapro in the past. Awaiting to re-establish psychiatry care again.     Migraine without status migrainosus, not intractable     12/10/2013     Other allergic rhinitis     12/10/2013     Other seasonal allergic rhinitis     12/10/2013     Sleep disorder     12/10/2013     Ulcerative colitis without  "complications (H)     8/9/2007   .      ROS  Pertinent ROS was performed and was negative, including for fever, chills, chest pain, shortness of breath, increased lower extremity edema, changes in bowel or bladder, blood in the stool, difficulty swallowing, sores in the mouth. No other concerns, with exception of HPI above.      EXAM:   BP (!) 136/98 (BP Location: Right arm, Patient Position: Sitting, Cuff Size: Adult Large)   Pulse 116   Temp 98.5  F (36.9  C) (Tympanic)   Resp 18   Wt 95.3 kg (210 lb)   SpO2 95%   BMI 30.13 kg/m      Estimated body mass index is 30.13 kg/m  as calculated from the following:    Height as of 4/9/20: 1.778 m (5' 10\").    Weight as of this encounter: 95.3 kg (210 lb).      GEN: Vitals reviewed. Healthy appearing. Patient is in no acute distress. Cooperative with exam.  HEENT: Normocephalic atraumatic.  Eyes grossly normal to inspection.  No discharge or erythema, or obvious scleral/conjunctival abnormalities.   CV: Heart regular in rate and rhythm with no murmur.    LUNGS: No audible wheeze, cough, or visible cyanosis.  No visible retractions or increased work of breathing.  Lungs clear to auscultation bilaterally.    ABD:  Obese.  SKIN: Warm and dry to touch.  Visible skin clear. No significant rash, abnormal pigmentation or lesions.  EXT: No clubbing or cyanosis.  No peripheral edema.  PSYCH: Mood is good.  Mentation appears normal, affect normal/bright, judgement and insight intact, normal speech and appearance well-groomed.     ASSESSMENT AND PLAN:    Attention deficit hyperactivity disorder (ADHD), combined type  Stable on controlled substances.  Refilled for 6 weeks.  Follow-up with primary care provider as scheduled.  - LORazepam (ATIVAN) 0.5 MG tablet  Dispense: 60 tablet; Refill: 1  - amphetamine-dextroamphetamine (ADDERALL XR) 30 MG 24 hr capsule  Dispense: 30 capsule; Refill: 0    Controlled substance agreement   See above  - LORazepam (ATIVAN) 0.5 MG tablet  " Dispense: 60 tablet; Refill: 1  - amphetamine-dextroamphetamine (ADDERALL XR) 30 MG 24 hr capsule  Dispense: 30 capsule; Refill: 0    Hair loss  Rx given  - finasteride (PROPECIA) 1 MG tablet  Dispense: 90 tablet; Refill: 0    Erectile dysfunction, unspecified erectile dysfunction type  Rx given  - sildenafil (VIAGRA) 100 MG tablet  Dispense: 30 tablet; Refill: 0    Elevated liver enzymes  Liver enzymes remain elevated.  I suspect this is secondary to nonalcoholic fatty liver disease with possibly an alcoholic component.  He is encouraged to reduce/and all alcohol use.  It is recommended that he have a repeat imaging study done and this can be done with ultrasound through his primary care provider.  - Comprehensive Metabolic Panel    Decreased GFR  Recheck today shows resolution of his renal dysfunction.  - Comprehensive Metabolic Panel                 Return in about 6 weeks (around 8/25/2020) for as scheduled.        BOYD DELGADO DO   7/14/2020 11:50 AM    This document was prepared using voice generated softwear. While every attempt was made for accuracy, grammatical errors may exist.

## 2020-07-14 NOTE — Clinical Note
MARIA ELENA - may need further evaluation of liver function (US, lab work, med review) when he sees you in Aug

## 2020-07-14 NOTE — LETTER
July 15, 2020      Kike OLIVIA Hess  [address] [place] [address]        Dear ,    We are writing to inform you of your test results.    Your lab test show that your kidney function has improved.  Your liver test however have worsened from prior.  It would be recommended that we continue to monitor this, pursue further evaluation and consider imaging with an ultrasound.  You can do this with Dr. Salcido when you see him in August otherwise call sooner and we can put the orders in.  In the meantime I would recommend discontinuing all alcohol intake as it can make liver disease worse.  Please call if you have any questions or concerns regarding this and we can set up a virtual or in person follow-up visit to discuss it further.    Resulted Orders   Comprehensive Metabolic Panel   Result Value Ref Range    Sodium 137 134 - 144 mmol/L    Potassium 3.8 3.5 - 5.1 mmol/L      Comment:      Specimen moderately hemolyzed, Potassium may be falsely elevated    Chloride 101 98 - 107 mmol/L    Carbon Dioxide 25 21 - 31 mmol/L    Anion Gap 11 6 - 17 mmol/L    Glucose 167 (H) 70 - 105 mg/dL      Comment:      Specimen grossly lipemic    Urea Nitrogen 18 7 - 25 mg/dL    Creatinine 0.98 0.70 - 1.30 mg/dL    GFR Estimate 84 >60 mL/min/[1.73_m2]    GFR Estimate If Black >90 >60 mL/min/[1.73_m2]    Calcium 10.2 8.6 - 10.3 mg/dL    Bilirubin Total 0.7 0.3 - 1.0 mg/dL    Albumin 4.7 3.5 - 5.7 g/dL    Protein Total 7.0 6.4 - 8.9 g/dL    Alkaline Phosphatase 71 34 - 104 U/L     (H) 7 - 52 U/L    AST Unsatisfactory specimen - hemolyzed 13 - 39 U/L       If you have any questions or concerns, please call the clinic at the number listed above.       Sincerely,        Lauren Villa, DO

## 2020-07-21 NOTE — TELEPHONE ENCOUNTER
Requested Prescriptions   Pending Prescriptions Disp Refills     TRANSDERM-SCOP (1.5 MG) 1 MG/3DAYS 72 hr patch [Pharmacy Med Name: Transderm-Scop 1.5 mg transdermal patch (1 mg over 3 days)] 10 patch 11     Sig: Place 1 patch onto the skin every 72 hours on dry, clean, hairless skin every 72 hours. For Motion Sickness       There is no refill protocol information for this order            Last Written Prescription Date:  03/01/2019  Last Fill Quantity: 10 patches,   # refills: 11  Last Office Visit: 07/14/2020 with Dr. Villa  Future Office visit:   None noted.  Unable to complete prescription refill per RN medication refill policy. Will route to provider for review and consideration.  Haven Potts RN on 7/21/2020 at 1:07 PM

## 2020-08-26 ENCOUNTER — VIRTUAL VISIT (OUTPATIENT)
Dept: INTERNAL MEDICINE | Facility: OTHER | Age: 40
End: 2020-08-26
Attending: INTERNAL MEDICINE
Payer: COMMERCIAL

## 2020-08-26 VITALS — WEIGHT: 210 LBS | SYSTOLIC BLOOD PRESSURE: 136 MMHG | DIASTOLIC BLOOD PRESSURE: 88 MMHG | BODY MASS INDEX: 30.13 KG/M2

## 2020-08-26 DIAGNOSIS — F90.2 ATTENTION DEFICIT HYPERACTIVITY DISORDER (ADHD), COMBINED TYPE: ICD-10-CM

## 2020-08-26 DIAGNOSIS — G43.009 MIGRAINE WITHOUT AURA AND WITHOUT STATUS MIGRAINOSUS, NOT INTRACTABLE: ICD-10-CM

## 2020-08-26 DIAGNOSIS — I10 BENIGN ESSENTIAL HYPERTENSION: ICD-10-CM

## 2020-08-26 DIAGNOSIS — F32.5 MAJOR DEPRESSION IN COMPLETE REMISSION (H): ICD-10-CM

## 2020-08-26 DIAGNOSIS — K52.9 INFLAMMATORY BOWEL DISEASE: ICD-10-CM

## 2020-08-26 DIAGNOSIS — Z79.899 CONTROLLED SUBSTANCE AGREEMENT SIGNED: ICD-10-CM

## 2020-08-26 PROCEDURE — 99442 ZZC PHYSICIAN TELEPHONE EVALUATION 11-20 MIN: CPT | Performed by: INTERNAL MEDICINE

## 2020-08-26 RX ORDER — AMLODIPINE BESYLATE 5 MG/1
5 TABLET ORAL DAILY
Qty: 90 TABLET | Refills: 3 | Status: SHIPPED | OUTPATIENT
Start: 2020-08-26 | End: 2020-11-18

## 2020-08-26 RX ORDER — LORAZEPAM 0.5 MG/1
0.5 TABLET ORAL 2 TIMES DAILY PRN
Qty: 60 TABLET | Refills: 2 | Status: SHIPPED | OUTPATIENT
Start: 2020-08-26 | End: 2020-11-18

## 2020-08-26 RX ORDER — METHYLPREDNISOLONE 4 MG/1
TABLET ORAL
Qty: 40 TABLET | Refills: 11 | Status: SHIPPED | OUTPATIENT
Start: 2020-08-26 | End: 2020-11-18

## 2020-08-26 RX ORDER — SILDENAFIL 100 MG/1
50-100 TABLET, FILM COATED ORAL DAILY PRN
Qty: 30 TABLET | Refills: 0 | Status: CANCELLED | OUTPATIENT
Start: 2020-08-26

## 2020-08-26 RX ORDER — DEXTROAMPHETAMINE SACCHARATE, AMPHETAMINE ASPARTATE MONOHYDRATE, DEXTROAMPHETAMINE SULFATE AND AMPHETAMINE SULFATE 7.5; 7.5; 7.5; 7.5 MG/1; MG/1; MG/1; MG/1
30 CAPSULE, EXTENDED RELEASE ORAL DAILY
Qty: 30 CAPSULE | Refills: 0 | Status: SHIPPED | OUTPATIENT
Start: 2020-09-23 | End: 2020-11-18

## 2020-08-26 RX ORDER — FINASTERIDE 1 MG/1
1 TABLET, FILM COATED ORAL DAILY
Qty: 90 TABLET | Refills: 3 | Status: CANCELLED | OUTPATIENT
Start: 2020-08-26

## 2020-08-26 RX ORDER — ESCITALOPRAM OXALATE 10 MG/1
10 TABLET ORAL DAILY
Qty: 90 TABLET | Refills: 3 | Status: SHIPPED | OUTPATIENT
Start: 2020-08-26 | End: 2020-11-18

## 2020-08-26 RX ORDER — DEXTROAMPHETAMINE SACCHARATE, AMPHETAMINE ASPARTATE MONOHYDRATE, DEXTROAMPHETAMINE SULFATE AND AMPHETAMINE SULFATE 7.5; 7.5; 7.5; 7.5 MG/1; MG/1; MG/1; MG/1
30 CAPSULE, EXTENDED RELEASE ORAL DAILY
Qty: 30 CAPSULE | Refills: 0 | Status: SHIPPED | OUTPATIENT
Start: 2020-08-26 | End: 2020-11-18

## 2020-08-26 RX ORDER — SUMATRIPTAN 50 MG/1
50 TABLET, FILM COATED ORAL
Qty: 6 TABLET | Refills: 11 | Status: SHIPPED | OUTPATIENT
Start: 2020-08-26 | End: 2020-11-18

## 2020-08-26 RX ORDER — MESALAMINE 400 MG/1
800 CAPSULE, DELAYED RELEASE ORAL 4 TIMES DAILY PRN
Qty: 240 CAPSULE | Refills: 11 | Status: SHIPPED | OUTPATIENT
Start: 2020-08-26 | End: 2020-11-18

## 2020-08-26 RX ORDER — DEXTROAMPHETAMINE SACCHARATE, AMPHETAMINE ASPARTATE MONOHYDRATE, DEXTROAMPHETAMINE SULFATE AND AMPHETAMINE SULFATE 7.5; 7.5; 7.5; 7.5 MG/1; MG/1; MG/1; MG/1
30 CAPSULE, EXTENDED RELEASE ORAL DAILY
Qty: 30 CAPSULE | Refills: 0 | Status: SHIPPED | OUTPATIENT
Start: 2020-10-21 | End: 2020-11-18

## 2020-08-26 ASSESSMENT — PATIENT HEALTH QUESTIONNAIRE - PHQ9
5. POOR APPETITE OR OVEREATING: NOT AT ALL
SUM OF ALL RESPONSES TO PHQ QUESTIONS 1-9: 0

## 2020-08-26 ASSESSMENT — ENCOUNTER SYMPTOMS
VOMITING: 0
CHILLS: 0
SHORTNESS OF BREATH: 0
EYE ITCHING: 1
WHEEZING: 0
CONFUSION: 0
DIZZINESS: 0
ABDOMINAL PAIN: 0
BRUISES/BLEEDS EASILY: 0
AGITATION: 0
NERVOUS/ANXIOUS: 1
ARTHRALGIAS: 0
NAUSEA: 0
FEVER: 0
FATIGUE: 0
COUGH: 0
HEMATURIA: 0
EYE PAIN: 0
ENDOCRINE COMMENTS: THINNING HAIR
MYALGIAS: 0
DYSURIA: 0
LIGHT-HEADEDNESS: 0
PALPITATIONS: 0
DIARRHEA: 0
EYE REDNESS: 1

## 2020-08-26 ASSESSMENT — PAIN SCALES - GENERAL: PAINLEVEL: NO PAIN (0)

## 2020-08-26 ASSESSMENT — ANXIETY QUESTIONNAIRES
2. NOT BEING ABLE TO STOP OR CONTROL WORRYING: NOT AT ALL
1. FEELING NERVOUS, ANXIOUS, OR ON EDGE: NOT AT ALL
GAD7 TOTAL SCORE: 0
7. FEELING AFRAID AS IF SOMETHING AWFUL MIGHT HAPPEN: NOT AT ALL
5. BEING SO RESTLESS THAT IT IS HARD TO SIT STILL: NOT AT ALL
3. WORRYING TOO MUCH ABOUT DIFFERENT THINGS: NOT AT ALL
6. BECOMING EASILY ANNOYED OR IRRITABLE: NOT AT ALL
IF YOU CHECKED OFF ANY PROBLEMS ON THIS QUESTIONNAIRE, HOW DIFFICULT HAVE THESE PROBLEMS MADE IT FOR YOU TO DO YOUR WORK, TAKE CARE OF THINGS AT HOME, OR GET ALONG WITH OTHER PEOPLE: NOT DIFFICULT AT ALL

## 2020-08-26 NOTE — PROGRESS NOTES
"Telephone Visit: Medication refills.  Mariela Davila LPN 8/26/2020 12:46 PM    Kike Hess is a 40 year old male who is being evaluated via a billable telephone visit.      The patient has been notified of following:     \"This telephone visit will be conducted via a call between you and your physician/provider. We have found that certain health care needs can be provided without the need for a physical exam.  This service lets us provide the care you need with a short phone conversation.  If a prescription is necessary we can send it directly to your pharmacy.  If lab work is needed we can place an order for that and you can then stop by our lab to have the test done at a later time.    Telephone visits are billed at different rates depending on your insurance coverage. During this emergency period, for some insurers they may be billed the same as an in-person visit.  Please reach out to your insurance provider with any questions.    If during the course of the call the physician/provider feels a telephone visit is not appropriate, you will not be charged for this service.\"    Patient has given verbal consent for Telephone visit?  Yes    What phone number would you like to be contacted at? [phone]    How would you like to obtain your AVS? Mail a copy    Subjective     Kike Hess is a 40 year old male who presents via phone visit today for the following health issues:    HPI    Review of Systems   Constitutional: Negative for chills, fatigue and fever.   HENT: Negative for congestion and hearing loss.    Eyes: Positive for redness and itching. Negative for pain and visual disturbance.        + eye irritation - rubs eyes on his pillow if sleeps very hard. Has eye drops that he uses intermittently   Respiratory: Negative for cough, shortness of breath and wheezing.    Cardiovascular: Negative for chest pain and palpitations.   Gastrointestinal: Negative for abdominal pain, diarrhea, nausea and " vomiting.   Endocrine: Negative for cold intolerance and heat intolerance.        Thinning hair   Genitourinary: Negative for dysuria and hematuria.        E.D.   Musculoskeletal: Negative for arthralgias and myalgias.   Skin: Negative for pallor.   Allergic/Immunologic: Positive for environmental allergies and food allergies. Negative for immunocompromised state.   Neurological: Negative for dizziness and light-headedness.   Hematological: Does not bruise/bleed easily.   Psychiatric/Behavioral: Negative for agitation and confusion. The patient is nervous/anxious.            Objective   Vitals - Patient Reported  Pain Score: No Pain (0)        healthy, alert, active and no distress  PSYCH: Alert and oriented times 3; coherent speech, normal   rate and volume, able to articulate logical thoughts, able   to abstract reason, no tangential thoughts, no hallucinations   or delusions  His affect is normal  RESP: No cough, no audible wheezing, able to talk in full sentences  Remainder of exam unable to be completed due to telephone visits    Labs reviewed in Epic.        Assessment/Plan:    ICD-10-CM    1. Major depression in complete remission (H)  F32.5 amitriptyline (ELAVIL) 25 MG tablet     escitalopram (LEXAPRO) 10 MG tablet   2. Benign essential hypertension  I10 amLODIPine (NORVASC) 5 MG tablet   3. Attention deficit hyperactivity disorder (ADHD), combined type  F90.2 amphetamine-dextroamphetamine (ADDERALL XR) 30 MG 24 hr capsule     LORazepam (ATIVAN) 0.5 MG tablet     amphetamine-dextroamphetamine (ADDERALL XR) 30 MG 24 hr capsule     amphetamine-dextroamphetamine (ADDERALL XR) 30 MG 24 hr capsule   4. Controlled substance agreement signed 5/24/2019  Z79.899 amphetamine-dextroamphetamine (ADDERALL XR) 30 MG 24 hr capsule     LORazepam (ATIVAN) 0.5 MG tablet     amphetamine-dextroamphetamine (ADDERALL XR) 30 MG 24 hr capsule     amphetamine-dextroamphetamine (ADDERALL XR) 30 MG 24 hr capsule   5. Inflammatory  bowel disease -- not definitive of UC vs chrons based on blood work in 2006  K52.9 mesalamine (DELZICOL) 400 MG DR capsule     methylPREDNISolone (MEDROL) 4 MG tablet   6. Migraine without aura and without status migrainosus, not intractable  G43.009 SUMAtriptan (IMITREX) 50 MG tablet      Patient contacted via telephone appointment due to virus pandemic.    Major depression, currently in remission.  Currently utilizing Lexapro and amitriptyline.  This condition is been effective for him.  He uses the amitriptyline at night to help with sleep as well.    Hypertension, blood pressures have been controlled.  Doing well with current medication regimen.  Needs refills of amlodipine.    ADHD, has signed controlled substance agreement.  We will get this updated at his next follow-up appointment in the office which will be in about 3 months for his physical.  Currently utilizing Adderall 1 tablet daily.  1 month supply with 2 refills sent to pharmacy.    Inflammatory bowel disease, reports his bowels have been fairly well managed recently.  Has not needed to use prednisone  emergency medications recently.  Mesalamine dosing has been stable and uses regularly.  Needs refills.    Migraine headaches, still getting headaches intermittently.  Imitrex is helpful.  Needs refills.    Phone call duration:  11 minutes

## 2020-08-27 ASSESSMENT — ANXIETY QUESTIONNAIRES: GAD7 TOTAL SCORE: 0

## 2020-11-10 ENCOUNTER — TELEPHONE (OUTPATIENT)
Dept: INTERNAL MEDICINE | Facility: OTHER | Age: 40
End: 2020-11-10

## 2020-11-10 DIAGNOSIS — R73.9 ELEVATED RANDOM BLOOD GLUCOSE LEVEL: ICD-10-CM

## 2020-11-10 DIAGNOSIS — E78.2 MIXED HYPERLIPIDEMIA: ICD-10-CM

## 2020-11-10 DIAGNOSIS — I10 BENIGN ESSENTIAL HYPERTENSION: Primary | ICD-10-CM

## 2020-11-18 ENCOUNTER — OFFICE VISIT (OUTPATIENT)
Dept: INTERNAL MEDICINE | Facility: OTHER | Age: 40
End: 2020-11-18
Attending: INTERNAL MEDICINE
Payer: COMMERCIAL

## 2020-11-18 VITALS
OXYGEN SATURATION: 94 % | BODY MASS INDEX: 28.06 KG/M2 | DIASTOLIC BLOOD PRESSURE: 78 MMHG | HEIGHT: 71 IN | WEIGHT: 200.4 LBS | RESPIRATION RATE: 16 BRPM | HEART RATE: 110 BPM | SYSTOLIC BLOOD PRESSURE: 130 MMHG | TEMPERATURE: 97.2 F

## 2020-11-18 DIAGNOSIS — F32.5 MAJOR DEPRESSION IN COMPLETE REMISSION (H): ICD-10-CM

## 2020-11-18 DIAGNOSIS — R73.9 ELEVATED RANDOM BLOOD GLUCOSE LEVEL: ICD-10-CM

## 2020-11-18 DIAGNOSIS — H10.33 ACUTE CONJUNCTIVITIS OF BOTH EYES, UNSPECIFIED ACUTE CONJUNCTIVITIS TYPE: ICD-10-CM

## 2020-11-18 DIAGNOSIS — Z79.899 CONTROLLED SUBSTANCE AGREEMENT SIGNED: ICD-10-CM

## 2020-11-18 DIAGNOSIS — R12 HEARTBURN: ICD-10-CM

## 2020-11-18 DIAGNOSIS — K52.9 INFLAMMATORY BOWEL DISEASE: ICD-10-CM

## 2020-11-18 DIAGNOSIS — F90.2 ATTENTION DEFICIT HYPERACTIVITY DISORDER (ADHD), COMBINED TYPE: ICD-10-CM

## 2020-11-18 DIAGNOSIS — Z00.00 ANNUAL PHYSICAL EXAM: Primary | ICD-10-CM

## 2020-11-18 DIAGNOSIS — E78.01 FAMILIAL HYPERCHOLESTEROLEMIA: ICD-10-CM

## 2020-11-18 DIAGNOSIS — I10 BENIGN ESSENTIAL HYPERTENSION: ICD-10-CM

## 2020-11-18 DIAGNOSIS — J30.81 ALLERGIC RHINITIS DUE TO CAT HAIR: ICD-10-CM

## 2020-11-18 DIAGNOSIS — N52.9 ERECTILE DYSFUNCTION, UNSPECIFIED ERECTILE DYSFUNCTION TYPE: ICD-10-CM

## 2020-11-18 DIAGNOSIS — E78.2 MIXED HYPERLIPIDEMIA: ICD-10-CM

## 2020-11-18 DIAGNOSIS — T75.3XXD MOTION SICKNESS, SUBSEQUENT ENCOUNTER: ICD-10-CM

## 2020-11-18 DIAGNOSIS — Z11.59 NEED FOR HEPATITIS C SCREENING TEST: ICD-10-CM

## 2020-11-18 DIAGNOSIS — Z23 ENCOUNTER FOR IMMUNIZATION: ICD-10-CM

## 2020-11-18 DIAGNOSIS — L65.9 HAIR LOSS: ICD-10-CM

## 2020-11-18 DIAGNOSIS — G43.009 MIGRAINE WITHOUT AURA AND WITHOUT STATUS MIGRAINOSUS, NOT INTRACTABLE: ICD-10-CM

## 2020-11-18 LAB
ERYTHROCYTE [DISTWIDTH] IN BLOOD BY AUTOMATED COUNT: 12.1 % (ref 10–15)
HBA1C MFR BLD: 5.7 % (ref 4–6)
HCT VFR BLD AUTO: 47.6 % (ref 40–53)
HGB BLD-MCNC: 17.3 G/DL (ref 13.3–17.7)
MCH RBC QN AUTO: 34.4 PG (ref 26.5–33)
MCHC RBC AUTO-ENTMCNC: 36.3 G/DL (ref 31.5–36.5)
MCV RBC AUTO: 95 FL (ref 78–100)
PLATELET # BLD AUTO: 192 10E9/L (ref 150–450)
RBC # BLD AUTO: 5.03 10E12/L (ref 4.4–5.9)
WBC # BLD AUTO: 7.2 10E9/L (ref 4–11)

## 2020-11-18 PROCEDURE — 99396 PREV VISIT EST AGE 40-64: CPT | Performed by: INTERNAL MEDICINE

## 2020-11-18 PROCEDURE — 85027 COMPLETE CBC AUTOMATED: CPT | Mod: ZL | Performed by: INTERNAL MEDICINE

## 2020-11-18 PROCEDURE — 83036 HEMOGLOBIN GLYCOSYLATED A1C: CPT | Mod: ZL | Performed by: INTERNAL MEDICINE

## 2020-11-18 PROCEDURE — 36415 COLL VENOUS BLD VENIPUNCTURE: CPT | Mod: ZL | Performed by: INTERNAL MEDICINE

## 2020-11-18 PROCEDURE — 80048 BASIC METABOLIC PNL TOTAL CA: CPT | Mod: ZL | Performed by: INTERNAL MEDICINE

## 2020-11-18 PROCEDURE — 84460 ALANINE AMINO (ALT) (SGPT): CPT | Mod: ZL | Performed by: INTERNAL MEDICINE

## 2020-11-18 PROCEDURE — 82040 ASSAY OF SERUM ALBUMIN: CPT | Mod: ZL | Performed by: INTERNAL MEDICINE

## 2020-11-18 PROCEDURE — 82247 BILIRUBIN TOTAL: CPT | Mod: ZL | Performed by: INTERNAL MEDICINE

## 2020-11-18 PROCEDURE — 84155 ASSAY OF PROTEIN SERUM: CPT | Mod: ZL | Performed by: INTERNAL MEDICINE

## 2020-11-18 PROCEDURE — 80061 LIPID PANEL: CPT | Mod: ZL | Performed by: INTERNAL MEDICINE

## 2020-11-18 PROCEDURE — G0008 ADMIN INFLUENZA VIRUS VAC: HCPCS

## 2020-11-18 PROCEDURE — 80307 DRUG TEST PRSMV CHEM ANLYZR: CPT | Mod: ZL | Performed by: INTERNAL MEDICINE

## 2020-11-18 PROCEDURE — 80053 COMPREHEN METABOLIC PANEL: CPT | Mod: ZL | Performed by: INTERNAL MEDICINE

## 2020-11-18 PROCEDURE — 84075 ASSAY ALKALINE PHOSPHATASE: CPT | Mod: ZL | Performed by: INTERNAL MEDICINE

## 2020-11-18 RX ORDER — ESCITALOPRAM OXALATE 10 MG/1
10 TABLET ORAL DAILY
Qty: 90 TABLET | Refills: 3 | Status: SHIPPED | OUTPATIENT
Start: 2020-11-18 | End: 2022-01-11

## 2020-11-18 RX ORDER — MESALAMINE 400 MG/1
800 CAPSULE, DELAYED RELEASE ORAL 4 TIMES DAILY PRN
Qty: 240 CAPSULE | Refills: 11 | Status: SHIPPED | OUTPATIENT
Start: 2020-11-18 | End: 2022-01-11

## 2020-11-18 RX ORDER — DEXTROAMPHETAMINE SACCHARATE, AMPHETAMINE ASPARTATE MONOHYDRATE, DEXTROAMPHETAMINE SULFATE AND AMPHETAMINE SULFATE 7.5; 7.5; 7.5; 7.5 MG/1; MG/1; MG/1; MG/1
30 CAPSULE, EXTENDED RELEASE ORAL DAILY
Qty: 30 CAPSULE | Refills: 0 | Status: SHIPPED | OUTPATIENT
Start: 2020-11-18 | End: 2021-04-09

## 2020-11-18 RX ORDER — FINASTERIDE 1 MG/1
1 TABLET, FILM COATED ORAL DAILY
Qty: 90 TABLET | Refills: 0 | Status: SHIPPED | OUTPATIENT
Start: 2020-11-18 | End: 2021-04-09

## 2020-11-18 RX ORDER — LORAZEPAM 0.5 MG/1
0.5 TABLET ORAL 2 TIMES DAILY PRN
Qty: 60 TABLET | Refills: 2 | Status: SHIPPED | OUTPATIENT
Start: 2020-11-18 | End: 2021-04-09

## 2020-11-18 RX ORDER — METHYLPREDNISOLONE 4 MG/1
TABLET ORAL
Qty: 40 TABLET | Refills: 11 | Status: SHIPPED | OUTPATIENT
Start: 2020-11-18

## 2020-11-18 RX ORDER — SUMATRIPTAN 50 MG/1
50 TABLET, FILM COATED ORAL
Qty: 6 TABLET | Refills: 11 | Status: SHIPPED | OUTPATIENT
Start: 2020-11-18 | End: 2022-01-11

## 2020-11-18 RX ORDER — DEXTROAMPHETAMINE SACCHARATE, AMPHETAMINE ASPARTATE MONOHYDRATE, DEXTROAMPHETAMINE SULFATE AND AMPHETAMINE SULFATE 7.5; 7.5; 7.5; 7.5 MG/1; MG/1; MG/1; MG/1
30 CAPSULE, EXTENDED RELEASE ORAL DAILY
Qty: 30 CAPSULE | Refills: 0 | Status: SHIPPED | OUTPATIENT
Start: 2020-12-16 | End: 2021-04-09

## 2020-11-18 RX ORDER — DEXTROAMPHETAMINE SACCHARATE, AMPHETAMINE ASPARTATE MONOHYDRATE, DEXTROAMPHETAMINE SULFATE AND AMPHETAMINE SULFATE 7.5; 7.5; 7.5; 7.5 MG/1; MG/1; MG/1; MG/1
30 CAPSULE, EXTENDED RELEASE ORAL DAILY
Qty: 30 CAPSULE | Refills: 0 | Status: SHIPPED | OUTPATIENT
Start: 2021-01-13 | End: 2021-04-09

## 2020-11-18 RX ORDER — SCOLOPAMINE TRANSDERMAL SYSTEM 1 MG/1
PATCH, EXTENDED RELEASE TRANSDERMAL
Qty: 10 PATCH | Refills: 11 | Status: SHIPPED | OUTPATIENT
Start: 2020-11-18 | End: 2022-03-10

## 2020-11-18 RX ORDER — ATORVASTATIN CALCIUM 80 MG/1
80 TABLET, FILM COATED ORAL DAILY
Qty: 90 TABLET | Refills: 3 | Status: SHIPPED | OUTPATIENT
Start: 2020-11-18 | End: 2020-12-10 | Stop reason: ALTCHOICE

## 2020-11-18 RX ORDER — SULFACETAMIDE SODIUM 100 MG/ML
1 SOLUTION/ DROPS OPHTHALMIC
Qty: 15 ML | Refills: 3 | Status: SHIPPED | OUTPATIENT
Start: 2020-11-18 | End: 2023-11-16

## 2020-11-18 RX ORDER — AMLODIPINE BESYLATE 5 MG/1
5 TABLET ORAL DAILY
Qty: 90 TABLET | Refills: 3 | Status: SHIPPED | OUTPATIENT
Start: 2020-11-18 | End: 2022-01-11

## 2020-11-18 RX ORDER — SILDENAFIL 100 MG/1
50-100 TABLET, FILM COATED ORAL DAILY PRN
Qty: 30 TABLET | Refills: 0 | Status: SHIPPED | OUTPATIENT
Start: 2020-11-18 | End: 2022-01-11

## 2020-11-18 RX ORDER — OMEGA-3-ACID ETHYL ESTERS 1 G/1
1 CAPSULE, LIQUID FILLED ORAL 4 TIMES DAILY
Qty: 360 CAPSULE | Refills: 3 | Status: SHIPPED | OUTPATIENT
Start: 2020-11-18 | End: 2022-01-11

## 2020-11-18 RX ORDER — LOSARTAN POTASSIUM 50 MG/1
50 TABLET ORAL DAILY
Qty: 90 TABLET | Refills: 3 | Status: SHIPPED | OUTPATIENT
Start: 2020-11-18 | End: 2022-01-11

## 2020-11-18 ASSESSMENT — ANXIETY QUESTIONNAIRES
GAD7 TOTAL SCORE: 5
3. WORRYING TOO MUCH ABOUT DIFFERENT THINGS: NOT AT ALL
7. FEELING AFRAID AS IF SOMETHING AWFUL MIGHT HAPPEN: NOT AT ALL
1. FEELING NERVOUS, ANXIOUS, OR ON EDGE: MORE THAN HALF THE DAYS
5. BEING SO RESTLESS THAT IT IS HARD TO SIT STILL: NOT AT ALL
IF YOU CHECKED OFF ANY PROBLEMS ON THIS QUESTIONNAIRE, HOW DIFFICULT HAVE THESE PROBLEMS MADE IT FOR YOU TO DO YOUR WORK, TAKE CARE OF THINGS AT HOME, OR GET ALONG WITH OTHER PEOPLE: SOMEWHAT DIFFICULT
2. NOT BEING ABLE TO STOP OR CONTROL WORRYING: SEVERAL DAYS
6. BECOMING EASILY ANNOYED OR IRRITABLE: SEVERAL DAYS

## 2020-11-18 ASSESSMENT — ENCOUNTER SYMPTOMS
LIGHT-HEADEDNESS: 0
DYSURIA: 0
NAUSEA: 0
CHILLS: 0
CONFUSION: 0
WHEEZING: 0
BRUISES/BLEEDS EASILY: 0
DIZZINESS: 0
COUGH: 0
FATIGUE: 0
ABDOMINAL PAIN: 0
VOMITING: 0
DIARRHEA: 0
MYALGIAS: 0
EYE ITCHING: 1
EYE REDNESS: 1
FEVER: 0
SHORTNESS OF BREATH: 0
PALPITATIONS: 0
EYE PAIN: 0
AGITATION: 0
ARTHRALGIAS: 0
ENDOCRINE COMMENTS: THINNING HAIR
HEMATURIA: 0
NERVOUS/ANXIOUS: 1

## 2020-11-18 ASSESSMENT — MIFFLIN-ST. JEOR: SCORE: 1830.88

## 2020-11-18 ASSESSMENT — PAIN SCALES - GENERAL: PAINLEVEL: NO PAIN (0)

## 2020-11-18 ASSESSMENT — PATIENT HEALTH QUESTIONNAIRE - PHQ9: 5. POOR APPETITE OR OVEREATING: SEVERAL DAYS

## 2020-11-18 NOTE — PROGRESS NOTES
Immunization Documentation    Prior to Immunization administration, verified patients identity using patient's name and date of birth. Please see IMMUNIZATIONS  and order for additional information.  Patient / Parent instructed to remain in clinic for 15 minutes and report any adverse reaction to staff immediately.    Was entire vial of medication used? Yes  Vial/Syringe: Syringe    Bhakti Frank LPN  11/18/2020   10:20 AM

## 2020-11-18 NOTE — PROGRESS NOTES
Nursing Notes:   Bhakti Frank LPN  11/18/2020  8:50 AM  Signed  Chief Complaint   Patient presents with     Physical   Patient has no concerns.  Medication Reconciliation complete.   Bhakti Frank LPN  ..................11/18/2020   8:40 AM     Nursing note reviewed with patient.  Accuracy and completeness verified.   Mr. Hess is a 40 year old male who:  Patient presents with:  Physical      ICD-10-CM    1. Annual physical exam  Z00.00    2. Encounter for immunization  Z23 GH-IMM- FLU VAC PRESRV FREE QUAD SPLIT VIR > 6 MONTHS IM   3. Acute conjunctivitis of both eyes, unspecified acute conjunctivitis type  H10.33 sulfacetamide (BLEPH-10) 10 % ophthalmic solution   4. Allergic rhinitis due to cat hair  J30.81 budesonide (RINOCORT AQUA) 32 MCG/ACT nasal spray   5. Attention deficit hyperactivity disorder (ADHD), combined type  F90.2 amphetamine-dextroamphetamine (ADDERALL XR) 30 MG 24 hr capsule     LORazepam (ATIVAN) 0.5 MG tablet     amphetamine-dextroamphetamine (ADDERALL XR) 30 MG 24 hr capsule     amphetamine-dextroamphetamine (ADDERALL XR) 30 MG 24 hr capsule   6. Controlled substance agreement signed 11/18/2020  Z79.899 amphetamine-dextroamphetamine (ADDERALL XR) 30 MG 24 hr capsule     LORazepam (ATIVAN) 0.5 MG tablet     amphetamine-dextroamphetamine (ADDERALL XR) 30 MG 24 hr capsule     amphetamine-dextroamphetamine (ADDERALL XR) 30 MG 24 hr capsule     Pain Drug Scr UR W Rptd Meds   7. Benign essential hypertension  I10 amLODIPine (NORVASC) 5 MG tablet     losartan (COZAAR) 50 MG tablet   8. Erectile dysfunction, unspecified erectile dysfunction type  N52.9 sildenafil (VIAGRA) 100 MG tablet   9. Hair loss  L65.9 finasteride (PROPECIA) 1 MG tablet   10. Familial hypercholesterolemia - at least heterozygote  E78.01 omega-3 acid ethyl esters (LOVAZA) 1 g capsule     atorvastatin (LIPITOR) 80 MG tablet   11. Heartburn  R12 omeprazole (PRILOSEC) 20 MG DR capsule   12. Inflammatory bowel  disease -- not definitive of UC vs chrons based on blood work in 2006  K52.9 mesalamine (DELZICOL) 400 MG DR capsule     methylPREDNISolone (MEDROL) 4 MG tablet   13. Major depression in complete remission (H)  F32.5 amitriptyline (ELAVIL) 25 MG tablet     escitalopram (LEXAPRO) 10 MG tablet   14. Migraine without aura and without status migrainosus, not intractable  G43.009 SUMAtriptan (IMITREX) 50 MG tablet   15. Motion sickness, subsequent encounter  T75.3XXD scopolamine (TRANSDERM-SCOP, 1.5 MG,) 1 MG/3DAYS 72 hr patch   16. Need for hepatitis C screening test  Z11.59 Hepatitis C Screen Reflex to HCV RNA Quant and Genotype     HPI  Patient presents for annual physical exam.  Overall doing quite well.  Needs multiple medication refills.  Just got his lab work checked this morning and is pending.    Needs a flu shot today.    Has issues with chronic rhinitis and conjunctivitis.  Currently utilizing nasal spray and eyedrops.  Needs refills.    ADHD, chronic, currently on Adderall.  Needs to continue utilizing medication to help with attention and focus.  Currently on Adderall 30 mg daily.  Prescription refilled x1 month with 1 refill.  Urine drug screen today.  Does take lorazepam twice daily as needed.  No opioid use.   website reviewed, no abnormal findings noted.  Proper medication use and misuse reviewed.  Patient has been using medications properly.  Controlled substance agreement updated today.  Prescriptions refilled x1 month with 2 refills.    Hypertension, blood pressure is currently well controlled.  Doing well with current medication regimen.  No syncope or presyncope.  Continue current dosing.  Labs today.  Needs refills.    Erectile dysfunction, chronic, ongoing.  Continues with Viagra as needed.  Refill sent to pharmacy.    Hair loss, continues with finasteride.  Needs refills.    Familial hyperlipidemia, currently on Lipitor 80 mg daily.  Has had liver enzyme elevation in the past.  Recheck today.   Recheck lipid panel.    Heartburn, fairly well controlled with omeprazole.  Needs refills.    Inflammatory bowel disease, still using Medrol and mesalamine as needed.  Needs refills.  History of depression, currently in remission.  Continues on amitriptyline.  Needs refills.    History of migraine headaches, currently improved.  Still has some migraines but they are not as severe or as frequent.    Motion sickness, still using scopolamine patches as needed.  Needs refills.    Hepatitis C screening today.  No known exposures.  No IV drug use.    Functional Capacity: > 4 METS.   Review of Systems   Constitutional: Negative for chills, fatigue and fever.   HENT: Negative for congestion and hearing loss.    Eyes: Positive for redness and itching. Negative for pain and visual disturbance.        + eye irritation - rubs eyes on his pillow if sleeps very hard. Has eye drops that he uses intermittently   Respiratory: Negative for cough, shortness of breath and wheezing.    Cardiovascular: Negative for chest pain and palpitations.   Gastrointestinal: Negative for abdominal pain, diarrhea, nausea and vomiting.   Endocrine: Negative for cold intolerance and heat intolerance.        Thinning hair   Genitourinary: Negative for dysuria and hematuria.        E.D.   Musculoskeletal: Negative for arthralgias and myalgias.   Skin: Negative for pallor.   Allergic/Immunologic: Positive for environmental allergies and food allergies. Negative for immunocompromised state.   Neurological: Negative for dizziness and light-headedness.   Hematological: Does not bruise/bleed easily.   Psychiatric/Behavioral: Negative for agitation and confusion. The patient is nervous/anxious.         Reviewed and updated as needed this visit by Provider   Tobacco  Allergies  Meds  Problems  Med Hx  Surg Hx  Fam Hx          EXAM:   Vitals:    11/18/20 0832   BP: 130/78   BP Location: Right arm   Patient Position: Sitting   Cuff Size: Adult Regular  "  Pulse: 110   Resp: 16   Temp: 97.2  F (36.2  C)   TempSrc: Tympanic   SpO2: 94%   Weight: 90.9 kg (200 lb 6.4 oz)   Height: 1.795 m (5' 10.67\")       Current Pain Score: No Pain (0)     BP Readings from Last 3 Encounters:   11/18/20 130/78   08/26/20 136/88   07/14/20 (!) 136/98      Wt Readings from Last 3 Encounters:   11/18/20 90.9 kg (200 lb 6.4 oz)   08/26/20 95.3 kg (210 lb)   07/14/20 95.3 kg (210 lb)      Estimated body mass index is 28.21 kg/m  as calculated from the following:    Height as of this encounter: 1.795 m (5' 10.67\").    Weight as of this encounter: 90.9 kg (200 lb 6.4 oz).     Physical Exam  Constitutional:       General: He is not in acute distress.     Appearance: He is well-developed. He is not diaphoretic.   HENT:      Head: Normocephalic and atraumatic.   Eyes:      General: No scleral icterus.     Conjunctiva/sclera: Conjunctivae normal.   Neck:      Musculoskeletal: Neck supple.   Cardiovascular:      Rate and Rhythm: Normal rate and regular rhythm.      Pulses: Normal pulses.   Pulmonary:      Effort: Pulmonary effort is normal.      Breath sounds: Normal breath sounds.   Abdominal:      Palpations: Abdomen is soft.      Tenderness: There is no abdominal tenderness.   Musculoskeletal:         General: No deformity.      Right lower leg: No edema.      Left lower leg: No edema.   Lymphadenopathy:      Cervical: No cervical adenopathy.   Skin:     General: Skin is warm and dry.      Findings: No rash.   Neurological:      Mental Status: He is alert and oriented to person, place, and time. Mental status is at baseline.   Psychiatric:         Mood and Affect: Mood normal.         Behavior: Behavior normal.          Procedures   INVESTIGATIONS:  - Labs reviewed in Epic     ASSESSMENT AND PLAN:  Problem List Items Addressed This Visit        Nervous and Auditory    Migraine without aura and without status migrainosus, not intractable    Relevant Medications    amLODIPine (NORVASC) 5 MG " tablet    amitriptyline (ELAVIL) 25 MG tablet    escitalopram (LEXAPRO) 10 MG tablet    SUMAtriptan (IMITREX) 50 MG tablet       Respiratory    Allergic rhinitis due to cat hair    Relevant Medications    budesonide (RINOCORT AQUA) 32 MCG/ACT nasal spray       Digestive    Motion sickness    Relevant Medications    scopolamine (TRANSDERM-SCOP, 1.5 MG,) 1 MG/3DAYS 72 hr patch    Inflammatory bowel disease -- not definitive of UC vs chrons based on blood work in 2006    Relevant Medications    omeprazole (PRILOSEC) 20 MG DR capsule    mesalamine (DELZICOL) 400 MG DR capsule    methylPREDNISolone (MEDROL) 4 MG tablet    scopolamine (TRANSDERM-SCOP, 1.5 MG,) 1 MG/3DAYS 72 hr patch       Endocrine    Familial hypercholesterolemia - at least heterozygote    Relevant Medications    omega-3 acid ethyl esters (LOVAZA) 1 g capsule    atorvastatin (LIPITOR) 80 MG tablet       Circulatory    Benign essential hypertension    Relevant Medications    amLODIPine (NORVASC) 5 MG tablet    losartan (COZAAR) 50 MG tablet       Musculoskeletal and Integumentary    Hair loss    Relevant Medications    sulfacetamide (BLEPH-10) 10 % ophthalmic solution    finasteride (PROPECIA) 1 MG tablet    methylPREDNISolone (MEDROL) 4 MG tablet       Behavioral    Attention deficit hyperactivity disorder (ADHD), combined type    Relevant Medications    amphetamine-dextroamphetamine (ADDERALL XR) 30 MG 24 hr capsule    LORazepam (ATIVAN) 0.5 MG tablet    amphetamine-dextroamphetamine (ADDERALL XR) 30 MG 24 hr capsule (Start on 12/16/2020)    amphetamine-dextroamphetamine (ADDERALL XR) 30 MG 24 hr capsule (Start on 1/13/2021)    Major depression in complete remission (H)    Relevant Medications    LORazepam (ATIVAN) 0.5 MG tablet    amitriptyline (ELAVIL) 25 MG tablet    escitalopram (LEXAPRO) 10 MG tablet       Other    Controlled substance agreement signed 11-18-20 (Chronic)    Relevant Medications    amphetamine-dextroamphetamine (ADDERALL XR) 30 MG  24 hr capsule    LORazepam (ATIVAN) 0.5 MG tablet    amphetamine-dextroamphetamine (ADDERALL XR) 30 MG 24 hr capsule (Start on 12/16/2020)    amphetamine-dextroamphetamine (ADDERALL XR) 30 MG 24 hr capsule (Start on 1/13/2021)    Other Relevant Orders    Pain Drug Scr UR W Rptd Meds    Erectile dysfunction, unspecified erectile dysfunction type    Relevant Medications    sildenafil (VIAGRA) 100 MG tablet      Other Visit Diagnoses     Annual physical exam    -  Primary    Encounter for immunization        Relevant Orders    GH-IMM- FLU VAC PRESRV FREE QUAD SPLIT VIR > 6 MONTHS IM (Completed)    Acute conjunctivitis of both eyes, unspecified acute conjunctivitis type        Relevant Medications    sulfacetamide (BLEPH-10) 10 % ophthalmic solution    Heartburn        Relevant Medications    omeprazole (PRILOSEC) 20 MG DR capsule    Need for hepatitis C screening test        Relevant Orders    Hepatitis C Screen Reflex to HCV RNA Quant and Genotype (Completed)        reviewed diet, exercise and weight control, cardiovascular risk and specific lipid/LDL goals reviewed  -- Expected clinical course discussed    -- Medications and their side effects discussed    The 10-year ASCVD risk score (Ty Tytariq TREJO Jr., et al., 2013) is: 1.1%    Values used to calculate the score:      Age: 40 years      Sex: Male      Is Non- : No      Diabetic: No      Tobacco smoker: No      Systolic Blood Pressure: 130 mmHg      Is BP treated: Yes      HDL Cholesterol: 44 mg/dL      Total Cholesterol: 151 mg/dL    Patient Instructions     1. Annual physical exam    2. Encounter for immunization  - GH-IMM- FLU VAC PRESRV FREE QUAD SPLIT VIR > 6 MONTHS IM    3. Acute conjunctivitis of both eyes, unspecified acute conjunctivitis type  - sulfacetamide (BLEPH-10) 10 % ophthalmic solution; Apply 1 drop to eye every 4 hours (while awake)  Dispense: 15 mL; Refill: 3    4. Allergic rhinitis due to cat hair  - budesonide (RINOCORT AQUA)  32 MCG/ACT nasal spray; Inhale 1 Spray into both nostrils once daily.  Dispense: 1 Bottle; Refill: 11    5. Attention deficit hyperactivity disorder (ADHD), combined type  - amphetamine-dextroamphetamine (ADDERALL XR) 30 MG 24 hr capsule; Take 1 capsule (30 mg) by mouth daily  Dispense: 30 capsule; Refill: 0  - LORazepam (ATIVAN) 0.5 MG tablet; Take 1 tablet (0.5 mg) by mouth 2 times daily as needed for anxiety  Dispense: 60 tablet; Refill: 2  - amphetamine-dextroamphetamine (ADDERALL XR) 30 MG 24 hr capsule; Take 1 capsule (30 mg) by mouth daily  Dispense: 30 capsule; Refill: 0  - amphetamine-dextroamphetamine (ADDERALL XR) 30 MG 24 hr capsule; Take 1 capsule (30 mg) by mouth daily  Dispense: 30 capsule; Refill: 0    6. Controlled substance agreement signed 5/24/2019  - amphetamine-dextroamphetamine (ADDERALL XR) 30 MG 24 hr capsule; Take 1 capsule (30 mg) by mouth daily  Dispense: 30 capsule; Refill: 0  - LORazepam (ATIVAN) 0.5 MG tablet; Take 1 tablet (0.5 mg) by mouth 2 times daily as needed for anxiety  Dispense: 60 tablet; Refill: 2  - amphetamine-dextroamphetamine (ADDERALL XR) 30 MG 24 hr capsule; Take 1 capsule (30 mg) by mouth daily  Dispense: 30 capsule; Refill: 0  - amphetamine-dextroamphetamine (ADDERALL XR) 30 MG 24 hr capsule; Take 1 capsule (30 mg) by mouth daily  Dispense: 30 capsule; Refill: 0  - Pain Drug Scr UR W Rptd Meds    7. Benign essential hypertension  - amLODIPine (NORVASC) 5 MG tablet; Take 1 tablet (5 mg) by mouth daily  Dispense: 90 tablet; Refill: 3  - losartan (COZAAR) 50 MG tablet; Take 1 tablet (50 mg) by mouth daily  Dispense: 90 tablet; Refill: 3    8. Erectile dysfunction, unspecified erectile dysfunction type  - sildenafil (VIAGRA) 100 MG tablet; Take 0.5-1 tablets ( mg) by mouth daily as needed (erectile dyfunction)  Dispense: 30 tablet; Refill: 0    9. Hair loss  - finasteride (PROPECIA) 1 MG tablet; Take 1 tablet (1 mg) by mouth daily  Dispense: 90 tablet; Refill:  0    10. Familial hypercholesterolemia - at least heterozygote  - omega-3 acid ethyl esters (LOVAZA) 1 g capsule; Take 1 capsule (1 g) by mouth 4 times daily  Dispense: 360 capsule; Refill: 3  - atorvastatin (LIPITOR) 80 MG tablet; Take 1 tablet (80 mg) by mouth daily  Dispense: 90 tablet; Refill: 3    11. Heartburn  - omeprazole (PRILOSEC) 20 MG DR capsule; Take 1 capsule (20 mg) by mouth daily 30-60 minutes prior to 1st meal of the day - limit use as able  Dispense: 90 capsule; Refill: 3    12. Inflammatory bowel disease -- not definitive of UC vs chrons based on blood work in 2006  - mesalamine (DELZICOL) 400 MG DR capsule; Take 2 capsules (800 mg) by mouth 4 times daily as needed  Dispense: 240 capsule; Refill: 11  - methylPREDNISolone (MEDROL) 4 MG tablet; TAKE AS DIRECTED FOR 10 DAYS FOR ULCERATIVE COLITIS FLAIR. REPEAT EVERY 4 WEEKS IF NEEDED. MAXIMUM OF 40 TABLETS MONTHLY  Dispense: 40 tablet; Refill: 11    13. Major depression in complete remission (H)  - amitriptyline (ELAVIL) 25 MG tablet; Take 2 tablets (50 mg) by mouth At Bedtime  Dispense: 180 tablet; Refill: 3  - escitalopram (LEXAPRO) 10 MG tablet; Take 1 tablet (10 mg) by mouth daily  Dispense: 90 tablet; Refill: 3    14. Migraine without aura and without status migrainosus, not intractable  - SUMAtriptan (IMITREX) 50 MG tablet; Take 1 tablet (50 mg) by mouth at onset of headache for migraine  Dispense: 6 tablet; Refill: 11    15. Motion sickness, subsequent encounter  - scopolamine (TRANSDERM-SCOP, 1.5 MG,) 1 MG/3DAYS 72 hr patch; Place 1 patch onto the skin every 72 hours on dry, clean, hairless skin every 72 hours. For Motion Sickness  Dispense: 10 patch; Refill: 11      Immunization History   Administered Date(s) Administered     Influenza (IIV3) PF 02/05/2013, 11/18/2013     Influenza Vaccine IM > 6 months Valent IIV4 09/19/2014, 12/14/2016, 01/18/2018, 11/21/2018, 11/15/2019     TDAP Vaccine (Boostrix) 04/04/2018      -- Flu shot today.     --  Starting at age 50 -- consider the new shingles shot (2 in series) (Shingrix) - from one of the local pharmacies, at your convenience. -- Check cost/coverage.     Return in approximately 12 weeks from today, or sooner as needed for follow-up with Dr. Salcido.    - Med Refills - Controlled RX    - Bring your remaining pills / pill bottles with to: Each of your Med Management/refill  appointments for pill counts.   - Urine drug screens for controlled medications will be completed every 3 to 12 months.   - Random pill counts and urine drug testing may occur.   - No illicit drug use or pill sharing will be tolerated.     Clinic : [phone]  Appointment line: [phone]        Return in approximately 1 year, or sooner as needed for follow-up with Dr. Salcido.  - Annual Follow-up / Physical     Clinic : [phone]  Appointment line: [phone]     Jarad Salcido MD   Internal Medicine  [facility]     Portions of this note were dictated using speech recognition software. The note has been proofread but errors in the text may have been overlooked. Please contact me if there are any concerns regarding the accuracy of the dictation.

## 2020-11-18 NOTE — NURSING NOTE
Chief Complaint   Patient presents with     Physical   Patient has no concerns.  Medication Reconciliation complete.   Bhakti Frank LPN  ..................11/18/2020   8:40 AM

## 2020-11-18 NOTE — PATIENT INSTRUCTIONS
1. Annual physical exam    2. Encounter for immunization  - GH-IMM- FLU VAC PRESRV FREE QUAD SPLIT VIR > 6 MONTHS IM    3. Acute conjunctivitis of both eyes, unspecified acute conjunctivitis type  - sulfacetamide (BLEPH-10) 10 % ophthalmic solution; Apply 1 drop to eye every 4 hours (while awake)  Dispense: 15 mL; Refill: 3    4. Allergic rhinitis due to cat hair  - budesonide (RINOCORT AQUA) 32 MCG/ACT nasal spray; Inhale 1 Spray into both nostrils once daily.  Dispense: 1 Bottle; Refill: 11    5. Attention deficit hyperactivity disorder (ADHD), combined type  - amphetamine-dextroamphetamine (ADDERALL XR) 30 MG 24 hr capsule; Take 1 capsule (30 mg) by mouth daily  Dispense: 30 capsule; Refill: 0  - LORazepam (ATIVAN) 0.5 MG tablet; Take 1 tablet (0.5 mg) by mouth 2 times daily as needed for anxiety  Dispense: 60 tablet; Refill: 2  - amphetamine-dextroamphetamine (ADDERALL XR) 30 MG 24 hr capsule; Take 1 capsule (30 mg) by mouth daily  Dispense: 30 capsule; Refill: 0  - amphetamine-dextroamphetamine (ADDERALL XR) 30 MG 24 hr capsule; Take 1 capsule (30 mg) by mouth daily  Dispense: 30 capsule; Refill: 0    6. Controlled substance agreement signed 5/24/2019  - amphetamine-dextroamphetamine (ADDERALL XR) 30 MG 24 hr capsule; Take 1 capsule (30 mg) by mouth daily  Dispense: 30 capsule; Refill: 0  - LORazepam (ATIVAN) 0.5 MG tablet; Take 1 tablet (0.5 mg) by mouth 2 times daily as needed for anxiety  Dispense: 60 tablet; Refill: 2  - amphetamine-dextroamphetamine (ADDERALL XR) 30 MG 24 hr capsule; Take 1 capsule (30 mg) by mouth daily  Dispense: 30 capsule; Refill: 0  - amphetamine-dextroamphetamine (ADDERALL XR) 30 MG 24 hr capsule; Take 1 capsule (30 mg) by mouth daily  Dispense: 30 capsule; Refill: 0  - Pain Drug Scr UR W Rptd Meds    7. Benign essential hypertension  - amLODIPine (NORVASC) 5 MG tablet; Take 1 tablet (5 mg) by mouth daily  Dispense: 90 tablet; Refill: 3  - losartan (COZAAR) 50 MG tablet; Take 1  tablet (50 mg) by mouth daily  Dispense: 90 tablet; Refill: 3    8. Erectile dysfunction, unspecified erectile dysfunction type  - sildenafil (VIAGRA) 100 MG tablet; Take 0.5-1 tablets ( mg) by mouth daily as needed (erectile dyfunction)  Dispense: 30 tablet; Refill: 0    9. Hair loss  - finasteride (PROPECIA) 1 MG tablet; Take 1 tablet (1 mg) by mouth daily  Dispense: 90 tablet; Refill: 0    10. Familial hypercholesterolemia - at least heterozygote  - omega-3 acid ethyl esters (LOVAZA) 1 g capsule; Take 1 capsule (1 g) by mouth 4 times daily  Dispense: 360 capsule; Refill: 3  - atorvastatin (LIPITOR) 80 MG tablet; Take 1 tablet (80 mg) by mouth daily  Dispense: 90 tablet; Refill: 3    11. Heartburn  - omeprazole (PRILOSEC) 20 MG DR capsule; Take 1 capsule (20 mg) by mouth daily 30-60 minutes prior to 1st meal of the day - limit use as able  Dispense: 90 capsule; Refill: 3    12. Inflammatory bowel disease -- not definitive of UC vs chrons based on blood work in 2006  - mesalamine (DELZICOL) 400 MG DR capsule; Take 2 capsules (800 mg) by mouth 4 times daily as needed  Dispense: 240 capsule; Refill: 11  - methylPREDNISolone (MEDROL) 4 MG tablet; TAKE AS DIRECTED FOR 10 DAYS FOR ULCERATIVE COLITIS FLAIR. REPEAT EVERY 4 WEEKS IF NEEDED. MAXIMUM OF 40 TABLETS MONTHLY  Dispense: 40 tablet; Refill: 11    13. Major depression in complete remission (H)  - amitriptyline (ELAVIL) 25 MG tablet; Take 2 tablets (50 mg) by mouth At Bedtime  Dispense: 180 tablet; Refill: 3  - escitalopram (LEXAPRO) 10 MG tablet; Take 1 tablet (10 mg) by mouth daily  Dispense: 90 tablet; Refill: 3    14. Migraine without aura and without status migrainosus, not intractable  - SUMAtriptan (IMITREX) 50 MG tablet; Take 1 tablet (50 mg) by mouth at onset of headache for migraine  Dispense: 6 tablet; Refill: 11    15. Motion sickness, subsequent encounter  - scopolamine (TRANSDERM-SCOP, 1.5 MG,) 1 MG/3DAYS 72 hr patch; Place 1 patch onto the skin  every 72 hours on dry, clean, hairless skin every 72 hours. For Motion Sickness  Dispense: 10 patch; Refill: 11      Immunization History   Administered Date(s) Administered     Influenza (IIV3) PF 02/05/2013, 11/18/2013     Influenza Vaccine IM > 6 months Valent IIV4 09/19/2014, 12/14/2016, 01/18/2018, 11/21/2018, 11/15/2019     TDAP Vaccine (Boostrix) 04/04/2018      -- Flu shot today.     -- Starting at age 50 -- consider the new shingles shot (2 in series) (Shingrix) - from one of the local pharmacies, at your convenience. -- Check cost/coverage.     Return in approximately 12 weeks from today, or sooner as needed for follow-up with Dr. Salcido.    - Med Refills - Controlled RX    - Bring your remaining pills / pill bottles with to: Each of your Med Management/refill  appointments for pill counts.   - Urine drug screens for controlled medications will be completed every 3 to 12 months.   - Random pill counts and urine drug testing may occur.   - No illicit drug use or pill sharing will be tolerated.     Clinic : [phone]  Appointment line: [phone]        Return in approximately 1 year, or sooner as needed for follow-up with Dr. Salcido.  - Annual Follow-up / Physical     Clinic : [phone]  Appointment line: [phone]

## 2020-11-19 ENCOUNTER — TELEPHONE (OUTPATIENT)
Dept: INTERNAL MEDICINE | Facility: OTHER | Age: 40
End: 2020-11-19

## 2020-11-19 DIAGNOSIS — E78.01 FAMILIAL HYPERCHOLESTEROLEMIA: Primary | ICD-10-CM

## 2020-11-19 DIAGNOSIS — E78.1 HYPERTRIGLYCERIDEMIA: ICD-10-CM

## 2020-11-19 LAB
ALBUMIN SERPL-MCNC: 4 G/DL (ref 3.4–5)
ALP SERPL-CCNC: 98 U/L (ref 40–150)
ALT SERPL W P-5'-P-CCNC: 160 U/L (ref 0–70)
ANION GAP SERPL CALCULATED.3IONS-SCNC: 12 MMOL/L (ref 3–14)
AST SERPL W P-5'-P-CCNC: ABNORMAL U/L (ref 0–45)
BILIRUB SERPL-MCNC: 0.6 MG/DL (ref 0.2–1.3)
BUN SERPL-MCNC: 14 MG/DL (ref 7–30)
CALCIUM SERPL-MCNC: 9.3 MG/DL (ref 8.5–10.1)
CHLORIDE SERPL-SCNC: 108 MMOL/L (ref 94–109)
CHOLEST SERPL-MCNC: 287 MG/DL
CO2 SERPL-SCNC: 18 MMOL/L (ref 20–32)
CREAT SERPL-MCNC: 1.02 MG/DL (ref 0.66–1.25)
GFR SERPL CREATININE-BSD FRML MDRD: >90 ML/MIN/{1.73_M2}
GLUCOSE SERPL-MCNC: 122 MG/DL (ref 70–99)
HDLC SERPL-MCNC: 29 MG/DL
LDLC SERPL CALC-MCNC: ABNORMAL MG/DL
NONHDLC SERPL-MCNC: 258 MG/DL
POTASSIUM SERPL-SCNC: 5.3 MMOL/L (ref 3.4–5.3)
PROT SERPL-MCNC: 8.1 G/DL (ref 6.8–8.8)
SODIUM SERPL-SCNC: 138 MMOL/L (ref 133–144)
TRIGL SERPL-MCNC: 1453 MG/DL

## 2020-11-19 ASSESSMENT — ANXIETY QUESTIONNAIRES: GAD7 TOTAL SCORE: 5

## 2020-11-19 NOTE — TELEPHONE ENCOUNTER
That's okay for now.   Should see cardiology for lipid consult. We can send referral if needed.   Please notify patient his Triglycerides / Cholesterol was Very high.     Recent Labs   Lab Test 11/18/20  0816 07/14/20  1206 05/24/19  0955 03/20/19  1313 04/04/18  0844 04/04/18  0844   A1C 5.7  --   --   --   --  5.5   LDL Cannot estimate LDL when triglyceride exceeds 400 mg/dL  --  86 112*   < > Cannot estimate LDL when triglyceride exceeds 400 mg/dL   HDL 29*  --  44 45   < > 50   TRIG 1,453*  --  105 194*   < > 692*   * 118*  --  61*   < > 81*   CR 1.02 0.98  --  1.39*   < > 0.97   GFRESTIMATED >90 84  --  57*   < > 87   GFRESTBLACK >90 >90  --  69   < > >90   POTASSIUM 5.3 3.8  --  4.8   < > 4.4    < > = values in this interval not displayed.      Jarad Salcido MD

## 2020-11-19 NOTE — TELEPHONE ENCOUNTER
Spoke with mother and she will text him and have him return call. Bhakti Frank LPN .............11/19/2020  12:24 PM

## 2020-11-19 NOTE — TELEPHONE ENCOUNTER
Lab called blood was hemolyzed and AST was cancelled. They also stated that potassium could be high due to this factor as well.   If you need the AST patient will need to have a new order and need to be redrawn. Bhakti Frank LPN .............11/19/2020  12:13 PM

## 2020-11-19 NOTE — LETTER
November 20, 2020      Kike Hess  [address] [place] [address]        Dear ,    We are writing to inform you of your test results.    Your cholesterol is crazy high.. recommend cardiology consult.     Cardiology referral sent - Consult with Dr. Perea or Noemi Rivera NP at [facility] - they will call with date/time of appointment.     Recent Labs   Lab Test 11/18/20  0816 07/14/20  1206 05/24/19  0955 03/20/19  1313 04/04/18  0844 04/04/18  0844   A1C 5.7  --   --   --   --  5.5   LDL Cannot estimate LDL when triglyceride exceeds 400 mg/dL  --  86 112*   < > Cannot estimate LDL when triglyceride exceeds 400 mg/dL   HDL 29*  --  44 45   < > 50   TRIG 1,453*  --  105 194*   < > 692*   * 118*  --  61*   < > 81*   CR 1.02 0.98  --  1.39*   < > 0.97   GFRESTIMATED >90 84  --  57*   < > 87   GFRESTBLACK >90 >90  --  69   < > >90   POTASSIUM 5.3 3.8  --  4.8   < > 4.4    < > = values in this interval not displayed.        If you have any questions or concerns, please call the clinic at the number listed above.       Sincerely,        Jarad Salcido MD

## 2020-11-20 PROBLEM — E78.1 HYPERTRIGLYCERIDEMIA: Status: ACTIVE | Noted: 2020-11-20

## 2020-11-20 NOTE — TELEPHONE ENCOUNTER
Please advise if a letter should be sent as patients number is invalid and patient cannot be reached via phone. Bhakti Frank LPN .............11/20/2020  1:17 PM

## 2020-11-24 LAB — PAIN DRUG SCR UR W RPTD MEDS: NORMAL

## 2020-12-10 ENCOUNTER — OFFICE VISIT (OUTPATIENT)
Dept: CARDIOLOGY | Facility: OTHER | Age: 40
End: 2020-12-10
Attending: INTERNAL MEDICINE
Payer: COMMERCIAL

## 2020-12-10 VITALS
OXYGEN SATURATION: 96 % | SYSTOLIC BLOOD PRESSURE: 144 MMHG | DIASTOLIC BLOOD PRESSURE: 108 MMHG | BODY MASS INDEX: 28.84 KG/M2 | HEIGHT: 71 IN | WEIGHT: 206 LBS | TEMPERATURE: 97.3 F | RESPIRATION RATE: 18 BRPM | HEART RATE: 136 BPM

## 2020-12-10 DIAGNOSIS — E78.1 HYPERTRIGLYCERIDEMIA: Primary | ICD-10-CM

## 2020-12-10 DIAGNOSIS — R74.8 ELEVATED LIVER ENZYMES: ICD-10-CM

## 2020-12-10 DIAGNOSIS — E78.01 FAMILIAL HYPERCHOLESTEROLEMIA: ICD-10-CM

## 2020-12-10 DIAGNOSIS — I10 BENIGN ESSENTIAL HYPERTENSION: ICD-10-CM

## 2020-12-10 DIAGNOSIS — R00.0 TACHYCARDIA: ICD-10-CM

## 2020-12-10 LAB — INTERPRETATION ECG - MUSE: NORMAL

## 2020-12-10 PROCEDURE — G0463 HOSPITAL OUTPT CLINIC VISIT: HCPCS

## 2020-12-10 PROCEDURE — G0463 HOSPITAL OUTPT CLINIC VISIT: HCPCS | Mod: 25

## 2020-12-10 PROCEDURE — 93005 ELECTROCARDIOGRAM TRACING: CPT

## 2020-12-10 PROCEDURE — 93010 ELECTROCARDIOGRAM REPORT: CPT | Performed by: INTERNAL MEDICINE

## 2020-12-10 PROCEDURE — 99204 OFFICE O/P NEW MOD 45 MIN: CPT | Performed by: INTERNAL MEDICINE

## 2020-12-10 RX ORDER — FENOFIBRATE 145 MG/1
145 TABLET, COATED ORAL DAILY
Qty: 90 TABLET | Refills: 3 | Status: SHIPPED | OUTPATIENT
Start: 2020-12-10 | End: 2021-11-15

## 2020-12-10 RX ORDER — ROSUVASTATIN CALCIUM 40 MG/1
40 TABLET, COATED ORAL DAILY
Qty: 90 TABLET | Refills: 3 | Status: SHIPPED | OUTPATIENT
Start: 2020-12-10 | End: 2022-01-11

## 2020-12-10 ASSESSMENT — MIFFLIN-ST. JEOR: SCORE: 1856.28

## 2020-12-10 ASSESSMENT — PAIN SCALES - GENERAL: PAINLEVEL: NO PAIN (0)

## 2020-12-10 NOTE — PROGRESS NOTES
[facility] HEART CARE   CARDIOLOGY CONSULT     Kike Hess   1980  [id number]    Jarad Salcido     Chief Complaint   Patient presents with     Consult For     familial hypercholesterolemia and hypertriglyceridemia          HPI:   Mr. Hess is a 40-year-old gentleman who is being seen by cardiology for elevated cholesterol and triglyceride levels.  He has been on Lipitor 80 mg and is seeing quite a spike in his triglycerides on 11/18/2020 at 1453.  I believe his elevated triglycerides are secondary to alcohol usage.    He reports history of elevated cholesterol and triglyceride levels.  He has been on Lipitor.  He admits to drinking alcohol regularly.  Triglycerides of 1453, total cholesterol of 287, and HDL of 29 on 11/18/2020.  He would not say specifically how much he is drinking.  It sounds like he over drinks and states he drinks every couple days.  I suspect this is part of the reason for his severely elevated triglyceride levels.  He eats a pretty good diet, minimizing his carbohydrates and sugars.  We also discussed making medication changes as outlined below.  I would like to repeat his labs in 8 to 12 weeks after initiation of TriCor, Crestor, and Lovaza.    In addition, he reports a long history of sinus tachycardia.  He states his heart rate has been fast since age 15.  He feels it is fast, independent of when he takes Adderall.  Heart rate today on his EKG was 116 bpm.  He did take his Adderall today at noon and was seen in approximately 2-3 PM.    IMAGING RESULTS:   EKG on 11/8/17:  Notes Recorded by Paulo Scott MD on 11/9/2017 at 4:42 PM EKG Interpretation:  Rhythm: Sinus Rate: 130 Axis: Normal Conduction: Borderline prolongation of QTc QRS: Normal ST Segments: Early repolarization pattern T wave: Normal Chambers: Normal PACs   Present: No PVCs Present: No  Impression:  Normal EKG. Sinus tachycardia. Comparison unavailable.  SignedPaulo MD Internal Medicine & Pediatrics  11/9/2017  4:42 PM    CURRENT MEDICATIONS:   Prior to Admission medications    Medication Sig Start Date End Date Taking? Authorizing Provider   amitriptyline (ELAVIL) 25 MG tablet Take 2 tablets (50 mg) by mouth At Bedtime 11/18/20   Jarad Salcido MD   amLODIPine (NORVASC) 5 MG tablet Take 1 tablet (5 mg) by mouth daily 11/18/20   Jarad Salcido MD   amphetamine-dextroamphetamine (ADDERALL XR) 30 MG 24 hr capsule Take 1 capsule (30 mg) by mouth daily 11/18/20   Jarad Salcido MD   amphetamine-dextroamphetamine (ADDERALL XR) 30 MG 24 hr capsule Take 1 capsule (30 mg) by mouth daily 12/16/20   Jarad Salcido MD   amphetamine-dextroamphetamine (ADDERALL XR) 30 MG 24 hr capsule Take 1 capsule (30 mg) by mouth daily 1/13/21   Jarad Salcido MD   atorvastatin (LIPITOR) 80 MG tablet Take 1 tablet (80 mg) by mouth daily 11/18/20   Jarad Salcido MD   budesonide (RINOCORT AQUA) 32 MCG/ACT nasal spray Inhale 1 Spray into both nostrils once daily. 11/18/20   Jarad Salcido MD   escitalopram (LEXAPRO) 10 MG tablet Take 1 tablet (10 mg) by mouth daily 11/18/20   aJrad aSlcido MD   finasteride (PROPECIA) 1 MG tablet Take 1 tablet (1 mg) by mouth daily 11/18/20   Jarad Salcido MD   LORazepam (ATIVAN) 0.5 MG tablet Take 1 tablet (0.5 mg) by mouth 2 times daily as needed for anxiety 11/18/20   Jarad Salcido MD   losartan (COZAAR) 50 MG tablet Take 1 tablet (50 mg) by mouth daily 11/18/20   Jarad Salcido MD   mesalamine (DELZICOL) 400 MG DR capsule Take 2 capsules (800 mg) by mouth 4 times daily as needed 11/18/20   Jarad Salcido MD   methylPREDNISolone (MEDROL) 4 MG tablet TAKE AS DIRECTED FOR 10 DAYS FOR ULCERATIVE COLITIS FLAIR. REPEAT EVERY 4 WEEKS IF NEEDED. MAXIMUM OF 40 TABLETS MONTHLY 11/18/20   Jarad Salcido MD   omega-3 acid ethyl esters (LOVAZA) 1 g capsule Take 1 capsule (1 g) by mouth 4 times daily 11/18/20   Jarad Salcido MD   omeprazole (PRILOSEC) 20 MG DR capsule Take 1 capsule (20 mg) by mouth  daily 30-60 minutes prior to 1st meal of the day - limit use as able 11/18/20   Jarad Salcido MD   scopolamine (TRANSDERM-SCOP, 1.5 MG,) 1 MG/3DAYS 72 hr patch Place 1 patch onto the skin every 72 hours on dry, clean, hairless skin every 72 hours. For Motion Sickness 11/18/20   Jarad Salcido MD   sildenafil (VIAGRA) 100 MG tablet Take 0.5-1 tablets ( mg) by mouth daily as needed (erectile dyfunction) 11/18/20   Jarad Salcido MD   sulfacetamide (BLEPH-10) 10 % ophthalmic solution Apply 1 drop to eye every 4 hours (while awake) 11/18/20   Jarad Salcido MD   SUMAtriptan (IMITREX) 50 MG tablet Take 1 tablet (50 mg) by mouth at onset of headache for migraine 11/18/20   Jarad Salcido MD       ALLERGIES:   Allergies   Allergen Reactions     Cats      Other reaction(s): Runny Nose  Itchy eyes and hand swelling     Food      Other reaction(s): Angioedema  Raw Parsnip AND Raw Carrots     Lisinopril Cough        PAST MEDICAL HISTORY:   Past Medical History:   Diagnosis Date     Allergic rhinitis due to animal hair and dander     12/10/2013     Gastro-esophageal reflux disease without esophagitis     12/10/2013     Major depressive disorder, single episode     12/10/2013,Previously followed with Psychiatry - was on Remeron, Adderall, Lexapro in the past. Awaiting to re-establish psychiatry care again.     Migraine without status migrainosus, not intractable     12/10/2013     Other allergic rhinitis     12/10/2013     Other seasonal allergic rhinitis     12/10/2013     Sleep disorder     12/10/2013     Ulcerative colitis without complications (H)     8/9/2007        PAST SURGICAL HISTORY:   Past Surgical History:   Procedure Laterality Date     APPENDECTOMY OPEN      2/4/13,Dr. Givens/[facility]        FAMILY HISTORY:   Family History   Problem Relation Age of Onset     Colon Cancer Paternal Grandmother         Cancer-colon     Colon Cancer Maternal Grandmother         Cancer-colon     Other - See Comments Other          Psychiatric illness,Aunt - suicide fall 2013        SOCIAL HISTORY:   Social History     Socioeconomic History     Marital status: Single     Spouse name: Not on file     Number of children: Not on file     Years of education: Not on file     Highest education level: Not on file   Occupational History     Not on file   Social Needs     Financial resource strain: Not on file     Food insecurity     Worry: Not on file     Inability: Not on file     Transportation needs     Medical: Not on file     Non-medical: Not on file   Tobacco Use     Smoking status: Never Smoker     Smokeless tobacco: Never Used   Substance and Sexual Activity     Alcohol use: Yes     Alcohol/week: 0.0 standard drinks     Comment: Alcoholic Drinks/day: ocassionally     Drug use: No     Sexual activity: Yes     Partners: Female   Lifestyle     Physical activity     Days per week: Not on file     Minutes per session: Not on file     Stress: Not on file   Relationships     Social connections     Talks on phone: Not on file     Gets together: Not on file     Attends [employer] service: Not on file     Active member of club or organization: Not on file     Attends meetings of clubs or organizations: Not on file     Relationship status: Not on file     Intimate partner violence     Fear of current or ex partner: Not on file     Emotionally abused: Not on file     Physically abused: Not on file     Forced sexual activity: Not on file   Other Topics Concern     Parent/sibling w/ CABG, MI or angioplasty before 65F 55M? 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     Recently was doing landscaping and carpentry work     - minimal work as of February 2014, for past 1 month.    Moved back to [place] [facility] in about November 2012.     For family mental health issues, he notes that an aunt committed suicide in    October 2013 and 3 maternal aunts and his mother are  on antidepressants. His    maternal grandmother was diagnosed with schizoaffective disorder, as was a    male cousin. Attention deficit hyperactivity disorder was significant for an    uncle and the client's cousins.          ROS:   CONSTITUTIONAL: No weight loss, fever, chills, weakness or fatigue.   HEENT: Eyes: No visual changes. Ears, Nose, Throat: No hearing loss, congestion or difficulty swallowing.   CARDIOVASCULAR: No chest pain, chest pressure or chest discomfort. (+) palpitations but no lower extremity edema.   RESPIRATORY: No shortness of breath, dyspnea upon exertion, cough or sputum production.   GASTROINTESTINAL: No abdominal pain. No anorexia, nausea, vomiting or diarrhea.   NEUROLOGICAL: No headache, lightheadedness, dizziness, syncope, ataxia or weakness.   HEMATOLOGIC: No anemia, bleeding or bruising.   PSYCHIATRIC: No history of depression or anxiety.   ENDOCRINOLOGIC: No reports of sweating, cold or heat intolerance. No polyuria or polydipsia.   SKIN: No abnormal rashes or itching.       PHYSICAL EXAM:   GENERAL: The patient is a well-developed, well-nourished, in no apparent distress. Alert and oriented x3.   HEENT: Head is normocephalic and atraumatic. Eyes are symmetrical with normal visual tracking.  HEART: Regular rate and rhythm, S1S2 present without murmur, rub or gallop. Tachy.  LUNGS: Respirations regular and unlabored. Clear to auscultation.   GI: Abdomen is soft and nondistended.   EXTREMITIES: No peripheral edema present.   MUSCULOSKELETAL: No joint swelling.   NEUROLOGIC: Alert and oriented X3.    SKIN: No jaundice. No rashes or visible skin lesions present.        LAB RESULTS:   Orders Only on 11/18/2020   Component Date Value Ref Range Status     Hepatitis C Antibody 11/18/2020 Nonreactive  NR^Nonreactive Final   Office Visit on 11/18/2020   Component Date Value Ref Range Status     Pain Drug SCR UR W RPTD Meds 11/18/2020 FINAL   Final   Orders Only on 11/18/2020   Component Date  Value Ref Range Status     WBC 11/18/2020 7.2  4.0 - 11.0 10e9/L Final     RBC Count 11/18/2020 5.03  4.4 - 5.9 10e12/L Final     Hemoglobin 11/18/2020 17.3  13.3 - 17.7 g/dL Final     Hematocrit 11/18/2020 47.6  40.0 - 53.0 % Final     MCV 11/18/2020 95  78 - 100 fl Final     MCH 11/18/2020 34.4* 26.5 - 33.0 pg Final     MCHC 11/18/2020 36.3  31.5 - 36.5 g/dL Final     RDW 11/18/2020 12.1  10.0 - 15.0 % Final     Platelet Count 11/18/2020 192  150 - 450 10e9/L Final     Hemoglobin A1C 11/18/2020 5.7  4.0 - 6.0 % Final     Sodium 11/18/2020 138  133 - 144 mmol/L Final     Potassium 11/18/2020 5.3  3.4 - 5.3 mmol/L Final     Chloride 11/18/2020 108  94 - 109 mmol/L Final     Carbon Dioxide 11/18/2020 18* 20 - 32 mmol/L Final     Anion Gap 11/18/2020 12  3 - 14 mmol/L Final     Glucose 11/18/2020 122* 70 - 99 mg/dL Final     Urea Nitrogen 11/18/2020 14  7 - 30 mg/dL Final     Creatinine 11/18/2020 1.02  0.66 - 1.25 mg/dL Final     GFR Estimate 11/18/2020 >90  >60 mL/min/[1.73_m2] Corrected     GFR Estimate If Black 11/18/2020 >90  >60 mL/min/[1.73_m2] Corrected     Calcium 11/18/2020 9.3  8.5 - 10.1 mg/dL Final     Bilirubin Total 11/18/2020 0.6  0.2 - 1.3 mg/dL Final     Albumin 11/18/2020 4.0  3.4 - 5.0 g/dL Final     Protein Total 11/18/2020 8.1  6.8 - 8.8 g/dL Final     Alkaline Phosphatase 11/18/2020 98  40 - 150 U/L Final     ALT 11/18/2020 160* 0 - 70 U/L Final     AST 11/18/2020 Canceled, Test credited  0 - 45 U/L Final     Cholesterol 11/18/2020 287* <200 mg/dL Final     Triglycerides 11/18/2020 1,453* <150 mg/dL Final     HDL Cholesterol 11/18/2020 29* >39 mg/dL Final     LDL Cholesterol Calculated 11/18/2020 Cannot estimate LDL when triglyceride exceeds 400 mg/dL  <100 mg/dL Final     Non HDL Cholesterol 11/18/2020 258* <130 mg/dL Final          ASSESSMENT:       ICD-10-CM    1. Hypertriglyceridemia  E78.1 CARDIOLOGY EVAL ADULT REFERRAL     EKG 12-lead, tracing only     fenofibrate (TRICOR) 145 MG tablet      Lipid Profile     Comprehensive metabolic panel   2. Familial hypercholesterolemia - at least heterozygote  E78.01 CARDIOLOGY EVAL ADULT REFERRAL     EKG 12-lead, tracing only     rosuvastatin (CRESTOR) 40 MG tablet     Lipid Profile     Comprehensive metabolic panel   3. Benign essential hypertension  I10    4. Tachycardia  R00.0 Zio Patch Holter Adult Pediatric Greater than 48 hrs   5. Elevated liver enzymes  R74.8          PLAN:   1.  Brent has seen quite a spike in his triglycerides as well as cholesterol levels while taking Lipitor 80 mg daily.  Thankfully, he has not had pancreatitis.  I am concerned that his spike in his triglycerides are secondary to alcohol usage.  I believe he minimizes his usage today.  He says he drinks every couple days.  I suspect this is a problem for him causing his elevated triglycerides.  We discussed cutting out alcohol, carbohydrates, and sugars to improve these levels.  He plans to increase his activity level as well.  We will make changes to his medications as outlined below.  2.  Stop Lipitor 80 mg daily.   3.  Start Crestor 40 mg daily.  4.  Start TriCor 145 mg daily.  5.  Continue Lovaza 1 gram x4 a day.   6.  Repeat Lipids in 8-12 weeks to assess his cholesterol and triglycerides.  7.  Zio patch for sinus tachycardia or unidentified dysrhythmia.  8.  F/U in 3 months after completion of labs.         Thank you for allowing me to participate in the care of your patient. Please do not hesitate to contact me if you have any questions.     Julio Perea, DO

## 2020-12-10 NOTE — NURSING NOTE
"Patient  Comes in for familial hypercholesterolemia and hypertriglyceridemia.  Olivia Gill LPN ....................12/10/2020   2:37 PM  Chief Complaint   Patient presents with     Consult For     familial hypercholesterolemia       Initial BP (!) 144/108 (BP Location: Right arm, Patient Position: Sitting, Cuff Size: Adult Large)   Pulse 136   Temp 97.3  F (36.3  C) (Tympanic)   Resp 18   Ht 1.795 m (5' 10.67\")   Wt 93.4 kg (206 lb)   SpO2 96%   BMI 29.00 kg/m   Estimated body mass index is 29 kg/m  as calculated from the following:    Height as of this encounter: 1.795 m (5' 10.67\").    Weight as of this encounter: 93.4 kg (206 lb).  Meds Reconciled: complete  Pt is not on Aspirin  Pt is on a Statin  PHQ and/or ANNMARIE reviewed. Pt referred to PCP/MH Provider as appropriate.    Olivia Gill LPN      "

## 2021-01-05 ENCOUNTER — HOSPITAL ENCOUNTER (OUTPATIENT)
Dept: CARDIOLOGY | Facility: OTHER | Age: 41
Discharge: HOME | End: 2021-01-05
Attending: INTERNAL MEDICINE | Admitting: INTERNAL MEDICINE
Payer: COMMERCIAL

## 2021-01-05 DIAGNOSIS — R00.0 TACHYCARDIA: Primary | ICD-10-CM

## 2021-01-05 PROCEDURE — 999N000157 HC STATISTIC RCP TIME EA 10 MIN

## 2021-01-05 PROCEDURE — 93246 EXT ECG>7D<15D RECORDING: CPT

## 2021-01-05 PROCEDURE — 93248 EXT ECG>7D<15D REV&INTERPJ: CPT | Performed by: INTERNAL MEDICINE

## 2021-01-05 NOTE — PATIENT INSTRUCTIONS
Patient instructed not to:   -take baths, swim, sauna   -remove device prior to 14 days   -use electric blankets   -shower or sweat excessively within first 24 hours of device application  Patient instructed to:   -shower as needed   -be carefull when toweling off and dressing   -press button when cardiac symptoms occur   -document symptoms in log book   -remove and return device (send via mail to [employer])   -Call [email or web address] with any questions

## 2021-01-05 NOTE — LETTER
February 10, 2021      Kike Hess  [address] [place] [address]        Dear ,    We are writing to inform you of your test results.    Here are the results of your Zio patch.  You had some extra beats/early beats which we call PACs or PVCs.  There were not many of these.  They are very common and generally do not require treatment or are life-threatening.  Interestingly enough, your average heart rate was 111 bpm which is abnormally high.  Normal heart rate is between  beats per minute.  I am concerned and perplexed about your elevated heart rate.  I am not sure as to why you are having this issue.  Possibly related to ongoing alcohol use if you are still drinking???  If not, you may need to see electrophysiology for study of your heart to see if there is an abnormal circuit.  I think it would be a good idea to see me in follow-up if you are willing.      Resulted Orders   Leadless EKG Monitor 8 to 14 Days    Narrative    Zio XT patch report on Kike Hess.  Ordered secondary to   tachycardia.     Worn for 14 days and 0 hr's.  After removing artifact, total time was 14   days and 0 hr's. Placed on 1/5/21 at 12:58 PM and completed on 1/19/2021   at 12:58 PM.     Underlying rhythm was sinus.     Hrt rate ranged from 67 bpm, maximum heart rate of 181 bmp, averaging 111   bmp.     No significant bradycardia, pauses, 2nd degree Mobitz type II or 3rd   degree heart block.    No atrial fibrillation was identified on this study.     x1 triggered events and x0 diary entries.  These corresponded to sinus   rhythm.     x0 runs of VT.       x0 runs of SVT.     Rare, less than 1% of PAC's and PVC's.     0 episodes of ventricular bigeminy.    0 episodes of ventricular trigeminy.         If you have any questions or concerns, please call the clinic at the number listed above.       Sincerely,      Julio Perea, DO

## 2021-03-23 ENCOUNTER — TELEPHONE (OUTPATIENT)
Dept: INTERNAL MEDICINE | Facility: OTHER | Age: 41
End: 2021-03-23

## 2021-04-08 ENCOUNTER — TELEPHONE (OUTPATIENT)
Dept: INTERNAL MEDICINE | Facility: OTHER | Age: 41
End: 2021-04-08

## 2021-04-09 ENCOUNTER — OFFICE VISIT (OUTPATIENT)
Dept: INTERNAL MEDICINE | Facility: OTHER | Age: 41
End: 2021-04-09
Attending: INTERNAL MEDICINE
Payer: COMMERCIAL

## 2021-04-09 ENCOUNTER — IMMUNIZATION (OUTPATIENT)
Dept: FAMILY MEDICINE | Facility: OTHER | Age: 41
End: 2021-04-09
Attending: INTERNAL MEDICINE
Payer: COMMERCIAL

## 2021-04-09 VITALS
SYSTOLIC BLOOD PRESSURE: 132 MMHG | TEMPERATURE: 97.7 F | WEIGHT: 210 LBS | OXYGEN SATURATION: 97 % | RESPIRATION RATE: 16 BRPM | BODY MASS INDEX: 29.56 KG/M2 | DIASTOLIC BLOOD PRESSURE: 78 MMHG | HEART RATE: 115 BPM

## 2021-04-09 DIAGNOSIS — Z79.899 CONTROLLED SUBSTANCE AGREEMENT SIGNED: ICD-10-CM

## 2021-04-09 DIAGNOSIS — L65.9 HAIR LOSS: ICD-10-CM

## 2021-04-09 DIAGNOSIS — I10 BENIGN ESSENTIAL HYPERTENSION: ICD-10-CM

## 2021-04-09 DIAGNOSIS — F90.2 ATTENTION DEFICIT HYPERACTIVITY DISORDER (ADHD), COMBINED TYPE: ICD-10-CM

## 2021-04-09 DIAGNOSIS — Z71.85 VACCINE COUNSELING: ICD-10-CM

## 2021-04-09 DIAGNOSIS — J32.0 CHRONIC MAXILLARY SINUSITIS: Primary | ICD-10-CM

## 2021-04-09 DIAGNOSIS — E78.01 FAMILIAL HYPERCHOLESTEROLEMIA: ICD-10-CM

## 2021-04-09 DIAGNOSIS — K52.9 INFLAMMATORY BOWEL DISEASE: ICD-10-CM

## 2021-04-09 PROCEDURE — 91300 PR COVID VAC PFIZER DIL RECON 30 MCG/0.3 ML IM: CPT

## 2021-04-09 PROCEDURE — G0463 HOSPITAL OUTPT CLINIC VISIT: HCPCS | Mod: 25

## 2021-04-09 PROCEDURE — G0463 HOSPITAL OUTPT CLINIC VISIT: HCPCS

## 2021-04-09 PROCEDURE — 99214 OFFICE O/P EST MOD 30 MIN: CPT | Performed by: INTERNAL MEDICINE

## 2021-04-09 RX ORDER — FINASTERIDE 1 MG/1
1 TABLET, FILM COATED ORAL DAILY
Qty: 90 TABLET | Refills: 0 | Status: SHIPPED | OUTPATIENT
Start: 2021-04-09 | End: 2021-12-20

## 2021-04-09 RX ORDER — DEXTROAMPHETAMINE SACCHARATE, AMPHETAMINE ASPARTATE MONOHYDRATE, DEXTROAMPHETAMINE SULFATE AND AMPHETAMINE SULFATE 7.5; 7.5; 7.5; 7.5 MG/1; MG/1; MG/1; MG/1
30 CAPSULE, EXTENDED RELEASE ORAL DAILY
Qty: 30 CAPSULE | Refills: 0 | Status: SHIPPED | OUTPATIENT
Start: 2021-06-04 | End: 2021-07-01

## 2021-04-09 RX ORDER — AZITHROMYCIN 250 MG/1
TABLET, FILM COATED ORAL
Qty: 6 TABLET | Refills: 0 | Status: SHIPPED | OUTPATIENT
Start: 2021-04-09 | End: 2021-04-14

## 2021-04-09 RX ORDER — DEXTROAMPHETAMINE SACCHARATE, AMPHETAMINE ASPARTATE MONOHYDRATE, DEXTROAMPHETAMINE SULFATE AND AMPHETAMINE SULFATE 7.5; 7.5; 7.5; 7.5 MG/1; MG/1; MG/1; MG/1
30 CAPSULE, EXTENDED RELEASE ORAL DAILY
Qty: 30 CAPSULE | Refills: 0 | Status: SHIPPED | OUTPATIENT
Start: 2021-05-07 | End: 2021-07-01

## 2021-04-09 RX ORDER — LORAZEPAM 0.5 MG/1
0.5 TABLET ORAL 2 TIMES DAILY PRN
Qty: 60 TABLET | Refills: 2 | Status: SHIPPED | OUTPATIENT
Start: 2021-04-09 | End: 2021-07-01

## 2021-04-09 RX ORDER — DEXTROAMPHETAMINE SACCHARATE, AMPHETAMINE ASPARTATE MONOHYDRATE, DEXTROAMPHETAMINE SULFATE AND AMPHETAMINE SULFATE 7.5; 7.5; 7.5; 7.5 MG/1; MG/1; MG/1; MG/1
30 CAPSULE, EXTENDED RELEASE ORAL DAILY
Qty: 30 CAPSULE | Refills: 0 | Status: SHIPPED | OUTPATIENT
Start: 2021-04-09 | End: 2021-07-01

## 2021-04-09 ASSESSMENT — ENCOUNTER SYMPTOMS
VOMITING: 0
CONFUSION: 0
NAUSEA: 0
ENDOCRINE COMMENTS: THINNING HAIR
SINUS PRESSURE: 1
ARTHRALGIAS: 0
DIARRHEA: 0
EYE PAIN: 0
MYALGIAS: 0
ABDOMINAL PAIN: 0
BRUISES/BLEEDS EASILY: 0
LIGHT-HEADEDNESS: 0
COUGH: 0
HEMATURIA: 0
EYE REDNESS: 1
PALPITATIONS: 0
CHILLS: 0
EYE ITCHING: 1
NERVOUS/ANXIOUS: 1
DIZZINESS: 0
AGITATION: 0
DYSURIA: 0
FEVER: 0
WHEEZING: 0
SHORTNESS OF BREATH: 0
FATIGUE: 0

## 2021-04-09 ASSESSMENT — PAIN SCALES - GENERAL: PAINLEVEL: MILD PAIN (2)

## 2021-04-09 ASSESSMENT — PATIENT HEALTH QUESTIONNAIRE - PHQ9: SUM OF ALL RESPONSES TO PHQ QUESTIONS 1-9: 0

## 2021-04-09 NOTE — PROGRESS NOTES
Mr. Hess is a 40 year old male who presents to the clinic for evaluation of the following problems:      ICD-10-CM    1. Chronic maxillary sinusitis  J32.0 OTOLARYNGOLOGY REFERRAL     azithromycin (ZITHROMAX) 250 MG tablet   2. Attention deficit hyperactivity disorder (ADHD), combined type  F90.2 LORazepam (ATIVAN) 0.5 MG tablet     amphetamine-dextroamphetamine (ADDERALL XR) 30 MG 24 hr capsule     amphetamine-dextroamphetamine (ADDERALL XR) 30 MG 24 hr capsule     amphetamine-dextroamphetamine (ADDERALL XR) 30 MG 24 hr capsule   3. Controlled substance agreement signed 11/18/2020  Z79.899 LORazepam (ATIVAN) 0.5 MG tablet     amphetamine-dextroamphetamine (ADDERALL XR) 30 MG 24 hr capsule     amphetamine-dextroamphetamine (ADDERALL XR) 30 MG 24 hr capsule     amphetamine-dextroamphetamine (ADDERALL XR) 30 MG 24 hr capsule   4. Hair loss  L65.9 finasteride (PROPECIA) 1 MG tablet   5. Benign essential hypertension  I10    6. Familial hypercholesterolemia - at least heterozygote  E78.01    7. Inflammatory bowel disease -- not definitive of UC vs chrons based on blood work in 2006  K52.9    8. Vaccine counseling  Z71.89      HPI  Patient presents for follow-up of multiple issues.    Has been having chronic sinus congestion mostly maxillary sinus bilaterally.  Uses saline nasal rinse, typically a couple times per day.  Has occasionally had more focal unilateral pain.  He would like to have a prescription on hand at home to start in the event he develops more one-sided, focal sinus pain and inflammation symptoms.  5 days of Zithromax sent to pharmacy.  Due to his chronic sinus issues, some home care instructions provided.  Recommend ENT consultation.    ADD/ADHD.  Continues on Adderall and lorazepam.  Doing well with current dosing.   website reviewed, no opioid use.  Proper medication use and misuse reviewed, patient has been using medications appropriate.  Controlled substance agreement is completed.   Prescriptions refilled x1 month with 2 refills.    Hair loss, continues on Propecia tablets intermittently.  1 mg daily when using.  Refill printed.    Hypertension, blood pressures currently well controlled.  Denies syncope or presyncope.  Continue current dosing.    Familial hyperlipidemia, currently on TriCor 145 mg daily plus Crestor 40 mg daily and Lovaza 1 g 4 times daily.    Inflammatory bowel disease, reports currently symptoms are rather quiet and bowel regimen is rather normal.    Vaccine counseling completed.  He would like to try getting his COVID-19 vaccination today if available.    Functional Capacity: > 4 METS.   Review of Systems   Constitutional: Negative for chills, fatigue and fever.   HENT: Positive for congestion (Maxillary, bilateral, chronic) and sinus pressure. Negative for hearing loss.    Eyes: Positive for redness and itching. Negative for pain and visual disturbance.        + eye irritation - rubs eyes on his pillow if sleeps very hard. Has eye drops that he uses intermittently   Respiratory: Negative for cough, shortness of breath and wheezing.    Cardiovascular: Negative for chest pain and palpitations.   Gastrointestinal: Negative for abdominal pain, diarrhea, nausea and vomiting.   Endocrine: Negative for cold intolerance and heat intolerance.        Thinning hair   Genitourinary: Negative for dysuria and hematuria.        E.D.   Musculoskeletal: Negative for arthralgias and myalgias.   Skin: Negative for pallor.   Allergic/Immunologic: Positive for environmental allergies and food allergies. Negative for immunocompromised state.   Neurological: Negative for dizziness and light-headedness.   Hematological: Does not bruise/bleed easily.   Psychiatric/Behavioral: Negative for agitation and confusion. The patient is nervous/anxious.         Reviewed and updated as needed this visit by Provider   Tobacco  Allergies  Meds  Problems  Med Hx  Surg Hx  Fam Hx          EXAM:   Vitals:  "   04/09/21 0933   BP: 132/78   BP Location: Right arm   Patient Position: Sitting   Cuff Size: Adult Regular   Pulse: 115   Resp: 16   Temp: 97.7  F (36.5  C)   TempSrc: Tympanic   SpO2: 97%   Weight: 95.3 kg (210 lb)       Current Pain Score: Mild Pain (2)     BP Readings from Last 3 Encounters:   04/09/21 132/78   12/10/20 (!) 144/108   11/18/20 130/78      Wt Readings from Last 3 Encounters:   04/09/21 95.3 kg (210 lb)   12/10/20 93.4 kg (206 lb)   11/18/20 90.9 kg (200 lb 6.4 oz)      Estimated body mass index is 29.56 kg/m  as calculated from the following:    Height as of 12/10/20: 1.795 m (5' 10.67\").    Weight as of this encounter: 95.3 kg (210 lb).     Physical Exam  Constitutional:       General: He is not in acute distress.     Appearance: He is well-developed. He is not diaphoretic.   HENT:      Head: Normocephalic and atraumatic.   Eyes:      General: No scleral icterus.     Conjunctiva/sclera: Conjunctivae normal.   Neck:      Musculoskeletal: Neck supple.   Cardiovascular:      Rate and Rhythm: Normal rate and regular rhythm.      Pulses: Normal pulses.   Pulmonary:      Effort: Pulmonary effort is normal.      Breath sounds: Normal breath sounds.   Abdominal:      Palpations: Abdomen is soft.      Tenderness: There is no abdominal tenderness.   Musculoskeletal:         General: No deformity.      Right lower leg: No edema.      Left lower leg: No edema.   Lymphadenopathy:      Cervical: No cervical adenopathy.   Skin:     General: Skin is warm and dry.      Findings: No rash.   Neurological:      Mental Status: He is alert and oriented to person, place, and time. Mental status is at baseline.   Psychiatric:         Mood and Affect: Mood normal.         Behavior: Behavior normal.        Procedures   INVESTIGATIONS:  - Labs reviewed in Epic     ASSESSMENT AND PLAN:  Problem List Items Addressed This Visit        Respiratory    Chronic maxillary sinusitis - Primary    Relevant Medications    " azithromycin (ZITHROMAX) 250 MG tablet    Other Relevant Orders    OTOLARYNGOLOGY REFERRAL       Digestive    Inflammatory bowel disease -- not definitive of UC vs chrons based on blood work in 2006       Endocrine    Familial hypercholesterolemia - at least heterozygote       Circulatory    Benign essential hypertension       Musculoskeletal and Integumentary    Hair loss    Relevant Medications    finasteride (PROPECIA) 1 MG tablet       Behavioral    Attention deficit hyperactivity disorder (ADHD), combined type    Relevant Medications    LORazepam (ATIVAN) 0.5 MG tablet    amphetamine-dextroamphetamine (ADDERALL XR) 30 MG 24 hr capsule (Start on 6/4/2021)    amphetamine-dextroamphetamine (ADDERALL XR) 30 MG 24 hr capsule (Start on 5/7/2021)    amphetamine-dextroamphetamine (ADDERALL XR) 30 MG 24 hr capsule       Other    Controlled substance [id number]-[id number]-2-9-21 (Chronic)    Relevant Medications    LORazepam (ATIVAN) 0.5 MG tablet    amphetamine-dextroamphetamine (ADDERALL XR) 30 MG 24 hr capsule (Start on 6/4/2021)    amphetamine-dextroamphetamine (ADDERALL XR) 30 MG 24 hr capsule (Start on 5/7/2021)    amphetamine-dextroamphetamine (ADDERALL XR) 30 MG 24 hr capsule      Other Visit Diagnoses     Vaccine counseling            reviewed diet, exercise and weight control  -- Expected clinical course discussed    -- Medications and their side effects discussed    The 10-year ASCVD risk score (Chetariq TREJO Jr., et al., 2013) is: 6.7%    Values used to calculate the score:      Age: 40 years      Sex: Male      Is Non- : No      Diabetic: No      Tobacco smoker: No      Systolic Blood Pressure: 132 mmHg      Is BP treated: Yes      HDL Cholesterol: 29 mg/dL      Total Cholesterol: 287 mg/dL    Patient Instructions   ENT (Otolaryngology) referral sent - Dr. Casey (or her partner) in [place]  - they will call with date/time of appointment.      FYI for sinus congestion:   Viral  rhinosinusitis is complicated by acute bacterial infection in only 0.5 to 2.0 percent of episodes, typically clears in 10 days, but may last up to 12 weeks. Acute sinusitis is inflammation that lasts for less than four weeks while chronic sinusitis lasts for more than 12weeks.     1. Use a high volume Neti Pot/sinus flush (Lazaro Med Sinus Rinse) - with Distilled Water - to irrigate sinuses/mucosal tissue.     -- Use warm distilled water and 2 packs of thesalt solution that comes with the bottle, dissolve in bottle up to 240 mL Foreign.  -- Irrigate each side of the nose bending over the sink, using 1/3- to 1/2 the volume of the bottle in each nostril every irrigation.       -- Irrigate 5 times daily and as needed.    -- Start nasal steroids (Nasonex, Flonase, etc.) -- Available over-the-counter, or with prescription. (Administer nasal steroid spray, after performing the saline nasal irrigation)    2. Tylenol or ibuprofen for pain and fevers as needed.     3. If you do not tolerate nasal sprays suchas Nasonex or Flonase, then you may use oral decongestant.  These medications work to shrink swollen tissue and reduce sinus pressure.      If you choose Sudafed (pseudoephedrine) -- use for only 5-7 days AS DIRECTED.    -- Speak to your pharmacist if you wish to start oral decongestant.   -- Sudafed (pseudoephedrine) can cause elevation of blood pressure.     -- May also use decongesting nasal spray -- briefly -- such as oxymetazoline (Afrin) -- administer 2 sprays into both nostrils, nightly for 3-4 days, ( - Or - twice daily for 3 days MAXIMUM) then STOP -- as can cause rebound congestion    4. Plenty of fluids and rest as needed.     You will need to be evaluated if you start toexperience:   Fever higher than 102.5 F (39.2 C)   Sudden and severe pain in the face and head   Trouble seeing or seeing double   Trouble thinking clearly   Swelling or redness around 1 or both eyes   Trouble breathing or astiff neck    * If you  are a smoker, try to quit *    -- Okay to use Claritin/Allegra/Zyrtec (without -D), can help for sneezing, itchy eyes, etc.        Blood pressure is well controlled.   Medications refilled.     Return in approximately 12 weeks from today, or sooner as needed for follow-up with Dr. Salcido.    - Med Refills - Controlled RX    -- Encourage Regular use of stool softeners.     - Bring your remaining pills / pill bottles with to: Each of your Med Management/refill  appointments for pill counts.   - Urine drug screens for controlled medications will be completed every 3 to 12 months.   - Random pill counts and urine drug testing may occur.   - No illicit drug use or pill sharing will be tolerated.     Clinic : [phone]  Appointment line: [phone]      Jarad Salcido MD  Internal Medicine  [facility]

## 2021-04-09 NOTE — PATIENT INSTRUCTIONS
ENT (Otolaryngology) referral sent - Dr. Casey (or her partner) in [place]  - they will call with date/time of appointment.      FYI for sinus congestion:   Viral rhinosinusitis is complicated by acute bacterial infection in only 0.5 to 2.0 percent of episodes, typically clears in 10 days, but may last up to 12 weeks. Acute sinusitis is inflammation that lasts for less than four weeks while chronic sinusitis lasts for more than 12weeks.     1. Use a high volume Neti Pot/sinus flush (Lazaro Med Sinus Rinse) - with Distilled Water - to irrigate sinuses/mucosal tissue.     -- Use warm distilled water and 2 packs of thesalt solution that comes with the bottle, dissolve in bottle up to 240 mL Foreign.  -- Irrigate each side of the nose bending over the sink, using 1/3- to 1/2 the volume of the bottle in each nostril every irrigation.       -- Irrigate 5 times daily and as needed.    -- Start nasal steroids (Nasonex, Flonase, etc.) -- Available over-the-counter, or with prescription. (Administer nasal steroid spray, after performing the saline nasal irrigation)    2. Tylenol or ibuprofen for pain and fevers as needed.     3. If you do not tolerate nasal sprays suchas Nasonex or Flonase, then you may use oral decongestant.  These medications work to shrink swollen tissue and reduce sinus pressure.      If you choose Sudafed (pseudoephedrine) -- use for only 5-7 days AS DIRECTED.    -- Speak to your pharmacist if you wish to start oral decongestant.   -- Sudafed (pseudoephedrine) can cause elevation of blood pressure.     -- May also use decongesting nasal spray -- briefly -- such as oxymetazoline (Afrin) -- administer 2 sprays into both nostrils, nightly for 3-4 days, ( - Or - twice daily for 3 days MAXIMUM) then STOP -- as can cause rebound congestion    4. Plenty of fluids and rest as needed.     You will need to be evaluated if you start toexperience:   Fever higher than 102.5 F (39.2 C)   Sudden and severe pain in the  face and head   Trouble seeing or seeing double   Trouble thinking clearly   Swelling or redness around 1 or both eyes   Trouble breathing or astiff neck    * If you are a smoker, try to quit *    -- Okay to use Claritin/Allegra/Zyrtec (without -D), can help for sneezing, itchy eyes, etc.        Blood pressure is well controlled.   Medications refilled.     Return in approximately 12 weeks from today, or sooner as needed for follow-up with Dr. Salcido.    - Med Refills - Controlled RX    -- Encourage Regular use of stool softeners.     - Bring your remaining pills / pill bottles with to: Each of your Med Management/refill  appointments for pill counts.   - Urine drug screens for controlled medications will be completed every 3 to 12 months.   - Random pill counts and urine drug testing may occur.   - No illicit drug use or pill sharing will be tolerated.     Clinic : [phone]  Appointment line: [phone]

## 2021-04-14 ENCOUNTER — TELEPHONE (OUTPATIENT)
Dept: OTOLARYNGOLOGY | Facility: OTHER | Age: 41
End: 2021-04-14

## 2021-04-30 ENCOUNTER — IMMUNIZATION (OUTPATIENT)
Dept: FAMILY MEDICINE | Facility: OTHER | Age: 41
End: 2021-04-30
Attending: FAMILY MEDICINE
Payer: COMMERCIAL

## 2021-04-30 PROCEDURE — 0002A PR COVID VAC PFIZER DIL RECON 30 MCG/0.3 ML IM: CPT

## 2021-07-01 ENCOUNTER — OFFICE VISIT (OUTPATIENT)
Dept: INTERNAL MEDICINE | Facility: OTHER | Age: 41
End: 2021-07-01
Attending: INTERNAL MEDICINE
Payer: COMMERCIAL

## 2021-07-01 VITALS
RESPIRATION RATE: 16 BRPM | TEMPERATURE: 97.2 F | SYSTOLIC BLOOD PRESSURE: 138 MMHG | HEART RATE: 89 BPM | WEIGHT: 202.6 LBS | OXYGEN SATURATION: 98 % | BODY MASS INDEX: 28.52 KG/M2 | DIASTOLIC BLOOD PRESSURE: 88 MMHG

## 2021-07-01 DIAGNOSIS — E78.01 FAMILIAL HYPERCHOLESTEROLEMIA: ICD-10-CM

## 2021-07-01 DIAGNOSIS — Z79.899 CONTROLLED SUBSTANCE AGREEMENT SIGNED: Chronic | ICD-10-CM

## 2021-07-01 DIAGNOSIS — I10 BENIGN ESSENTIAL HYPERTENSION: ICD-10-CM

## 2021-07-01 DIAGNOSIS — F90.2 ATTENTION DEFICIT HYPERACTIVITY DISORDER (ADHD), COMBINED TYPE: Primary | ICD-10-CM

## 2021-07-01 PROBLEM — R00.0 TACHYCARDIA: Status: RESOLVED | Noted: 2020-12-10 | Resolved: 2021-07-01

## 2021-07-01 PROCEDURE — G0463 HOSPITAL OUTPT CLINIC VISIT: HCPCS

## 2021-07-01 PROCEDURE — 99214 OFFICE O/P EST MOD 30 MIN: CPT | Performed by: INTERNAL MEDICINE

## 2021-07-01 RX ORDER — DEXTROAMPHETAMINE SACCHARATE, AMPHETAMINE ASPARTATE MONOHYDRATE, DEXTROAMPHETAMINE SULFATE AND AMPHETAMINE SULFATE 7.5; 7.5; 7.5; 7.5 MG/1; MG/1; MG/1; MG/1
30 CAPSULE, EXTENDED RELEASE ORAL DAILY
Qty: 30 CAPSULE | Refills: 0 | Status: SHIPPED | OUTPATIENT
Start: 2021-08-26 | End: 2021-10-19

## 2021-07-01 RX ORDER — DEXTROAMPHETAMINE SACCHARATE, AMPHETAMINE ASPARTATE MONOHYDRATE, DEXTROAMPHETAMINE SULFATE AND AMPHETAMINE SULFATE 7.5; 7.5; 7.5; 7.5 MG/1; MG/1; MG/1; MG/1
30 CAPSULE, EXTENDED RELEASE ORAL DAILY
Qty: 30 CAPSULE | Refills: 0 | Status: SHIPPED | OUTPATIENT
Start: 2021-07-01 | End: 2021-10-19

## 2021-07-01 RX ORDER — LORAZEPAM 0.5 MG/1
0.5 TABLET ORAL 2 TIMES DAILY PRN
Qty: 60 TABLET | Refills: 2 | Status: SHIPPED | OUTPATIENT
Start: 2021-07-01 | End: 2021-10-19

## 2021-07-01 RX ORDER — DEXTROAMPHETAMINE SACCHARATE, AMPHETAMINE ASPARTATE MONOHYDRATE, DEXTROAMPHETAMINE SULFATE AND AMPHETAMINE SULFATE 7.5; 7.5; 7.5; 7.5 MG/1; MG/1; MG/1; MG/1
30 CAPSULE, EXTENDED RELEASE ORAL DAILY
Qty: 30 CAPSULE | Refills: 0 | Status: SHIPPED | OUTPATIENT
Start: 2021-07-29 | End: 2021-10-19

## 2021-07-01 ASSESSMENT — ANXIETY QUESTIONNAIRES
7. FEELING AFRAID AS IF SOMETHING AWFUL MIGHT HAPPEN: NOT AT ALL
3. WORRYING TOO MUCH ABOUT DIFFERENT THINGS: NOT AT ALL
IF YOU CHECKED OFF ANY PROBLEMS ON THIS QUESTIONNAIRE, HOW DIFFICULT HAVE THESE PROBLEMS MADE IT FOR YOU TO DO YOUR WORK, TAKE CARE OF THINGS AT HOME, OR GET ALONG WITH OTHER PEOPLE: NOT DIFFICULT AT ALL
GAD7 TOTAL SCORE: 0
5. BEING SO RESTLESS THAT IT IS HARD TO SIT STILL: NOT AT ALL
6. BECOMING EASILY ANNOYED OR IRRITABLE: NOT AT ALL
2. NOT BEING ABLE TO STOP OR CONTROL WORRYING: NOT AT ALL
1. FEELING NERVOUS, ANXIOUS, OR ON EDGE: NOT AT ALL

## 2021-07-01 ASSESSMENT — ENCOUNTER SYMPTOMS
SHORTNESS OF BREATH: 0
MYALGIAS: 0
NAUSEA: 0
WHEEZING: 0
FATIGUE: 0
ENDOCRINE COMMENTS: THINNING HAIR
VOMITING: 0
CONFUSION: 0
DYSURIA: 0
AGITATION: 0
EYE REDNESS: 1
ARTHRALGIAS: 0
LIGHT-HEADEDNESS: 0
HEMATURIA: 0
DIARRHEA: 0
SINUS PRESSURE: 1
FEVER: 0
COUGH: 0
PALPITATIONS: 0
EYE ITCHING: 1
NERVOUS/ANXIOUS: 1
EYE PAIN: 0
ABDOMINAL PAIN: 0
CHILLS: 0
BRUISES/BLEEDS EASILY: 0
DIZZINESS: 0

## 2021-07-01 ASSESSMENT — PATIENT HEALTH QUESTIONNAIRE - PHQ9
SUM OF ALL RESPONSES TO PHQ QUESTIONS 1-9: 0
5. POOR APPETITE OR OVEREATING: NOT AT ALL

## 2021-07-01 ASSESSMENT — PAIN SCALES - GENERAL: PAINLEVEL: MILD PAIN (3)

## 2021-07-01 NOTE — PROGRESS NOTES
Mr. Hess is a 41 year old male who presents to the clinic for evaluation of the following problems:      ICD-10-CM    1. Attention deficit hyperactivity disorder (ADHD), combined type  F90.2 amphetamine-dextroamphetamine (ADDERALL XR) 30 MG 24 hr capsule     amphetamine-dextroamphetamine (ADDERALL XR) 30 MG 24 hr capsule     amphetamine-dextroamphetamine (ADDERALL XR) 30 MG 24 hr capsule     LORazepam (ATIVAN) 0.5 MG tablet   2. Controlled substance [id number]-hzeljxddy-6-9-21  Z79.899 amphetamine-dextroamphetamine (ADDERALL XR) 30 MG 24 hr capsule     amphetamine-dextroamphetamine (ADDERALL XR) 30 MG 24 hr capsule     amphetamine-dextroamphetamine (ADDERALL XR) 30 MG 24 hr capsule     LORazepam (ATIVAN) 0.5 MG tablet   3. Familial hypercholesterolemia - at least heterozygote  E78.01    4. Benign essential hypertension  I10      HPI  Patient presents for controlled medication management follow-up appointment.  He has ADHD and chronic anxiety.  Continues with Adderall and lorazepam.  Controlled substance agreement is up-to-date.  No illicit drug use.   website reviewed, no abnormalities noted.  No opioid use.  Proper medication use and misuse reviewed, patient has been using medications properly.  Prescription refilled x1 month with 2 refills.  Follow-up in about 3 months or sooner as needed.    Familial hyperlipidemia, cholesterol levels have been very high.  Now on omega-3, Crestor, TriCor.  He is not fasting today but would like to schedule lab only appointment to recheck labs.    Hypertension, blood pressure is currently well controlled.  Doing well with current medication regimen.  No changes for now.    Functional Capacity: > 4 METS.   Review of Systems   Constitutional: Negative for chills, fatigue and fever.   HENT: Positive for congestion (Maxillary, bilateral, chronic) and sinus pressure. Negative for hearing loss.    Eyes: Positive for redness and itching. Negative for pain and visual disturbance.  "       + eye irritation - rubs eyes on his pillow if sleeps very hard. Has eye drops that he uses intermittently   Respiratory: Negative for cough, shortness of breath and wheezing.    Cardiovascular: Negative for chest pain and palpitations.   Gastrointestinal: Negative for abdominal pain, diarrhea, nausea and vomiting.   Endocrine: Negative for cold intolerance and heat intolerance.        Thinning hair   Genitourinary: Negative for dysuria and hematuria.        E.D.   Musculoskeletal: Negative for arthralgias and myalgias.   Skin: Negative for pallor.   Allergic/Immunologic: Positive for environmental allergies and food allergies. Negative for immunocompromised state.   Neurological: Negative for dizziness and light-headedness.   Hematological: Does not bruise/bleed easily.   Psychiatric/Behavioral: Negative for agitation and confusion. The patient is nervous/anxious.         Reviewed and updated as needed this visit by Provider   Tobacco  Allergies  Meds  Problems  Med Hx  Surg Hx  Fam Hx          EXAM:   Vitals:    07/01/21 0834 07/01/21 0839   BP: (!) 138/100 138/88   BP Location: Right arm Right arm   Patient Position: Sitting Sitting   Cuff Size: Adult Regular Adult Regular   Pulse: 89    Resp: 16    Temp: 97.2  F (36.2  C)    TempSrc: Tympanic    SpO2: 98%    Weight: 91.9 kg (202 lb 9.6 oz)        Current Pain Score: Mild Pain (3)     BP Readings from Last 3 Encounters:   07/01/21 138/88   04/09/21 132/78   12/10/20 (!) 144/108      Wt Readings from Last 3 Encounters:   07/01/21 91.9 kg (202 lb 9.6 oz)   04/09/21 95.3 kg (210 lb)   12/10/20 93.4 kg (206 lb)      Estimated body mass index is 28.52 kg/m  as calculated from the following:    Height as of 12/10/20: 1.795 m (5' 10.67\").    Weight as of this encounter: 91.9 kg (202 lb 9.6 oz).     Physical Exam  Constitutional:       General: He is not in acute distress.     Appearance: He is well-developed. He is not diaphoretic.   HENT:      Head: " Normocephalic and atraumatic.   Eyes:      General: No scleral icterus.     Conjunctiva/sclera: Conjunctivae normal.   Neck:      Musculoskeletal: Neck supple.   Cardiovascular:      Rate and Rhythm: Normal rate and regular rhythm.      Pulses: Normal pulses.   Pulmonary:      Effort: Pulmonary effort is normal.      Breath sounds: Normal breath sounds.   Abdominal:      Palpations: Abdomen is soft.      Tenderness: There is no abdominal tenderness.   Musculoskeletal:         General: No deformity.      Right lower leg: No edema.      Left lower leg: No edema.   Lymphadenopathy:      Cervical: No cervical adenopathy.   Skin:     General: Skin is warm and dry.      Findings: No rash.   Neurological:      Mental Status: He is alert and oriented to person, place, and time. Mental status is at baseline.   Psychiatric:         Mood and Affect: Mood normal.         Behavior: Behavior normal.        Procedures   INVESTIGATIONS:  - Labs reviewed in [facility]     ASSESSMENT AND PLAN:  Problem List Items Addressed This Visit        Endocrine    Familial hypercholesterolemia - at least heterozygote       Circulatory    Benign essential hypertension       Behavioral    Attention deficit hyperactivity disorder (ADHD), combined type - Primary    Relevant Medications    amphetamine-dextroamphetamine (ADDERALL XR) 30 MG 24 hr capsule (Start on 8/26/2021)    amphetamine-dextroamphetamine (ADDERALL XR) 30 MG 24 hr capsule (Start on 7/29/2021)    amphetamine-dextroamphetamine (ADDERALL XR) 30 MG 24 hr capsule    LORazepam (ATIVAN) 0.5 MG tablet       Other    Controlled substance kiostzdre-gbmxnptlj-5-9-21 (Chronic)    Relevant Medications    amphetamine-dextroamphetamine (ADDERALL XR) 30 MG 24 hr capsule (Start on 8/26/2021)    amphetamine-dextroamphetamine (ADDERALL XR) 30 MG 24 hr capsule (Start on 7/29/2021)    amphetamine-dextroamphetamine (ADDERALL XR) 30 MG 24 hr capsule    LORazepam (ATIVAN) 0.5 MG tablet        reviewed diet,  exercise and weight control, recommended sodium restriction, cardiovascular risk and specific lipid/LDL goals reviewed  -- Expected clinical course discussed    -- Medications and their side effects discussed    The 10-year ASCVD risk score (Che TREJO Jr., et al., 2013) is: 7.8%    Values used to calculate the score:      Age: 41 years      Sex: Male      Is Non- : No      Diabetic: No      Tobacco smoker: No      Systolic Blood Pressure: 138 mmHg      Is BP treated: Yes      HDL Cholesterol: 29 mg/dL      Total Cholesterol: 287 mg/dL    Patient Instructions     Immunization History   Administered Date(s) Administered     COVID-19,PF,Pfizer 04/09/2021, 04/30/2021     Influenza (IIV3) PF 02/05/2013, 11/18/2013     Influenza Vaccine IM > 6 months Valent IIV4 09/19/2014, 12/14/2016, 01/18/2018, 11/21/2018, 11/15/2019, 11/18/2020     TDAP Vaccine (Boostrix) 04/04/2018      -- Consider Annual Flu / Influenza vaccination - Every Fall (Starting about October 1st)    Recent Labs   Lab Test 11/18/20  0816 07/14/20  1206 05/24/19  0955 03/20/19  1313 04/04/18  0844 04/04/18  0844   A1C 5.7  --   --   --   --  5.5   LDL Cannot estimate LDL when triglyceride exceeds 400 mg/dL  --  86 112*   < > Cannot estimate LDL when triglyceride exceeds 400 mg/dL   HDL 29*  --  44 45   < > 50   TRIG 1,453*  --  105 194*   < > 692*   * 118*  --  61*   < > 81*   CR 1.02 0.98  --  1.39*   < > 0.97   GFRESTIMATED >90 84  --  57*   < > 87   GFRESTBLACK >90 >90  --  69   < > >90   POTASSIUM 5.3 3.8  --  4.8   < > 4.4    < > = values in this interval not displayed.        Return in approximately 12 weeks from today, or sooner as needed for follow-up with Dr. Salcido.  - Med Refills - Controlled RX    -- Encourage Regular use of stool softeners.     - Bring your remaining pills / pill bottles with to: Each of your Med Management/refill  appointments for pill counts.   - Urine drug screens for controlled medications will  be completed every 3 to 12 months.   - Random pill counts and urine drug testing may occur.   - No illicit drug use or pill sharing will be tolerated.     Clinic : [phone]  Appointment line: [phone]      Electronically signed by:  Jarad Salcido MD on 7/1/2021  Internal Medicine  [facility]

## 2021-07-01 NOTE — PATIENT INSTRUCTIONS
Immunization History   Administered Date(s) Administered     COVID-19,PF,Pfizer 04/09/2021, 04/30/2021     Influenza (IIV3) PF 02/05/2013, 11/18/2013     Influenza Vaccine IM > 6 months Valent IIV4 09/19/2014, 12/14/2016, 01/18/2018, 11/21/2018, 11/15/2019, 11/18/2020     TDAP Vaccine (Boostrix) 04/04/2018      -- Consider Annual Flu / Influenza vaccination - Every Fall (Starting about October 1st)    Recent Labs   Lab Test 11/18/20  0816 07/14/20  1206 05/24/19  0955 03/20/19  1313 04/04/18  0844 04/04/18  0844   A1C 5.7  --   --   --   --  5.5   LDL Cannot estimate LDL when triglyceride exceeds 400 mg/dL  --  86 112*   < > Cannot estimate LDL when triglyceride exceeds 400 mg/dL   HDL 29*  --  44 45   < > 50   TRIG 1,453*  --  105 194*   < > 692*   * 118*  --  61*   < > 81*   CR 1.02 0.98  --  1.39*   < > 0.97   GFRESTIMATED >90 84  --  57*   < > 87   GFRESTBLACK >90 >90  --  69   < > >90   POTASSIUM 5.3 3.8  --  4.8   < > 4.4    < > = values in this interval not displayed.        Return in approximately 12 weeks from today, or sooner as needed for follow-up with Dr. Salcido.  - Med Refills - Controlled RX    -- Encourage Regular use of stool softeners.     - Bring your remaining pills / pill bottles with to: Each of your Med Management/refill  appointments for pill counts.   - Urine drug screens for controlled medications will be completed every 3 to 12 months.   - Random pill counts and urine drug testing may occur.   - No illicit drug use or pill sharing will be tolerated.     Clinic : [phone]  Appointment line: [phone]

## 2021-07-01 NOTE — PROGRESS NOTES
Medication Reconciliation: complete     FOOD SECURITY SCREENING QUESTIONS  Hunger Vital Signs:  Within the past 12 months we worried whether our food would run out before we got money to buy more. Never  Within the past 12 months the food we bought just didn't last and we didn't have money to get more. Never  Bhakti Frank LPN 6/24/2021 10:34 AM

## 2021-07-02 ASSESSMENT — ANXIETY QUESTIONNAIRES: GAD7 TOTAL SCORE: 0

## 2021-10-11 ENCOUNTER — TELEPHONE (OUTPATIENT)
Dept: INTERNAL MEDICINE | Facility: OTHER | Age: 41
End: 2021-10-11

## 2021-10-11 NOTE — TELEPHONE ENCOUNTER
Patient has metabolic panel and lipid panel good until 12/10...     Okay to schedule lab only appointment.     Electronically signed by:  Jarad Salcido MD  on October 11, 2021 5:19 PM

## 2021-10-11 NOTE — TELEPHONE ENCOUNTER
Last visit was 7/1/21, notes states:    Familial hyperlipidemia, cholesterol levels have been very high.  Now on omega-3, Crestor, TriCor.  He is not fasting today but would like to schedule lab only appointment to recheck labs.    Lipid lab is pending does [facility] want to order anything else?  Jennifer Miles LPN .............10/11/2021     2:10 PM

## 2021-10-11 NOTE — TELEPHONE ENCOUNTER
Patient needs a lab order.  He states DJS had written one, but I could not find.        Argentina Mackay on 10/11/2021 at 12:41 PM

## 2021-10-12 NOTE — TELEPHONE ENCOUNTER
Patient was contacted and a lab only appointment has been made for 10.15.2021.  Jonna Del Castillo on 10/12/2021 at 9:05 AM

## 2021-10-13 ENCOUNTER — TELEPHONE (OUTPATIENT)
Dept: LAB | Facility: OTHER | Age: 41
End: 2021-10-13

## 2021-10-13 DIAGNOSIS — E78.01 FAMILIAL HYPERCHOLESTEROLEMIA: Primary | ICD-10-CM

## 2021-10-13 DIAGNOSIS — E78.1 HYPERTRIGLYCERIDEMIA: ICD-10-CM

## 2021-10-13 DIAGNOSIS — I10 BENIGN ESSENTIAL HYPERTENSION: ICD-10-CM

## 2021-10-13 DIAGNOSIS — R79.89 ELEVATED LFTS: ICD-10-CM

## 2021-10-13 DIAGNOSIS — R73.9 ELEVATED RANDOM BLOOD GLUCOSE LEVEL: ICD-10-CM

## 2021-10-13 NOTE — PROGRESS NOTES
Pt has diagnostic appt Fri 10/15 but no orders. Please enter orders at earliest convenience. Thank you :)

## 2021-10-15 ENCOUNTER — LAB (OUTPATIENT)
Dept: LAB | Facility: OTHER | Age: 41
End: 2021-10-15
Attending: INTERNAL MEDICINE
Payer: COMMERCIAL

## 2021-10-15 DIAGNOSIS — R79.89 ELEVATED LFTS: ICD-10-CM

## 2021-10-15 DIAGNOSIS — R73.9 ELEVATED RANDOM BLOOD GLUCOSE LEVEL: ICD-10-CM

## 2021-10-15 DIAGNOSIS — I10 BENIGN ESSENTIAL HYPERTENSION: ICD-10-CM

## 2021-10-15 DIAGNOSIS — E78.1 HYPERTRIGLYCERIDEMIA: ICD-10-CM

## 2021-10-15 DIAGNOSIS — E78.01 FAMILIAL HYPERCHOLESTEROLEMIA: ICD-10-CM

## 2021-10-15 LAB
ALBUMIN SERPL-MCNC: 4.6 G/DL (ref 3.5–5.7)
ALP SERPL-CCNC: 44 U/L (ref 34–104)
ALT SERPL W P-5'-P-CCNC: 44 U/L (ref 7–52)
ANION GAP SERPL CALCULATED.3IONS-SCNC: 9 MMOL/L (ref 3–14)
AST SERPL W P-5'-P-CCNC: 35 U/L (ref 13–39)
BILIRUB SERPL-MCNC: 0.6 MG/DL (ref 0.3–1)
BUN SERPL-MCNC: 23 MG/DL (ref 7–25)
CALCIUM SERPL-MCNC: 9.7 MG/DL (ref 8.6–10.3)
CHLORIDE BLD-SCNC: 102 MMOL/L (ref 98–107)
CHOLEST SERPL-MCNC: 172 MG/DL
CO2 SERPL-SCNC: 28 MMOL/L (ref 21–31)
CREAT SERPL-MCNC: 0.99 MG/DL (ref 0.7–1.3)
ERYTHROCYTE [DISTWIDTH] IN BLOOD BY AUTOMATED COUNT: 13 % (ref 10–15)
FASTING STATUS PATIENT QL REPORTED: YES
GFR SERPL CREATININE-BSD FRML MDRD: >90 ML/MIN/1.73M2
GLUCOSE BLD-MCNC: 122 MG/DL (ref 70–105)
HBA1C MFR BLD: 6 % (ref 4–6.2)
HCT VFR BLD AUTO: 44.2 % (ref 40–53)
HDLC SERPL-MCNC: 54 MG/DL (ref 23–92)
HGB BLD-MCNC: 14.8 G/DL (ref 13.3–17.7)
LDLC SERPL CALC-MCNC: 77 MG/DL
MCH RBC QN AUTO: 32.2 PG (ref 26.5–33)
MCHC RBC AUTO-ENTMCNC: 33.5 G/DL (ref 31.5–36.5)
MCV RBC AUTO: 96 FL (ref 78–100)
NONHDLC SERPL-MCNC: 118 MG/DL
PLATELET # BLD AUTO: 145 10E3/UL (ref 150–450)
POTASSIUM BLD-SCNC: 4.3 MMOL/L (ref 3.5–5.1)
PROT SERPL-MCNC: 7 G/DL (ref 6.4–8.9)
RBC # BLD AUTO: 4.59 10E6/UL (ref 4.4–5.9)
SODIUM SERPL-SCNC: 139 MMOL/L (ref 134–144)
TRIGL SERPL-MCNC: 203 MG/DL
TSH SERPL DL<=0.005 MIU/L-ACNC: 2.53 MU/L (ref 0.4–4)
WBC # BLD AUTO: 6.2 10E3/UL (ref 4–11)

## 2021-10-15 PROCEDURE — 85027 COMPLETE CBC AUTOMATED: CPT | Mod: ZL

## 2021-10-15 PROCEDURE — 36415 COLL VENOUS BLD VENIPUNCTURE: CPT | Mod: ZL

## 2021-10-15 PROCEDURE — 83036 HEMOGLOBIN GLYCOSYLATED A1C: CPT | Mod: ZL

## 2021-10-15 PROCEDURE — 82040 ASSAY OF SERUM ALBUMIN: CPT | Mod: ZL

## 2021-10-15 PROCEDURE — 84443 ASSAY THYROID STIM HORMONE: CPT | Mod: ZL

## 2021-10-15 PROCEDURE — 80061 LIPID PANEL: CPT | Mod: ZL

## 2021-10-19 ENCOUNTER — OFFICE VISIT (OUTPATIENT)
Dept: INTERNAL MEDICINE | Facility: OTHER | Age: 41
End: 2021-10-19
Attending: INTERNAL MEDICINE
Payer: COMMERCIAL

## 2021-10-19 ENCOUNTER — IMMUNIZATION (OUTPATIENT)
Dept: FAMILY MEDICINE | Facility: OTHER | Age: 41
End: 2021-10-19
Attending: INTERNAL MEDICINE
Payer: COMMERCIAL

## 2021-10-19 VITALS
DIASTOLIC BLOOD PRESSURE: 88 MMHG | SYSTOLIC BLOOD PRESSURE: 138 MMHG | TEMPERATURE: 97.9 F | BODY MASS INDEX: 28.7 KG/M2 | HEART RATE: 106 BPM | RESPIRATION RATE: 18 BRPM | OXYGEN SATURATION: 96 % | HEIGHT: 71 IN | WEIGHT: 205 LBS

## 2021-10-19 DIAGNOSIS — F90.2 ATTENTION DEFICIT HYPERACTIVITY DISORDER (ADHD), COMBINED TYPE: Primary | ICD-10-CM

## 2021-10-19 DIAGNOSIS — Z79.899 OTHER LONG TERM (CURRENT) DRUG THERAPY: ICD-10-CM

## 2021-10-19 DIAGNOSIS — Z23 NEED FOR INFLUENZA VACCINATION: ICD-10-CM

## 2021-10-19 DIAGNOSIS — E78.01 FAMILIAL HYPERCHOLESTEROLEMIA: ICD-10-CM

## 2021-10-19 DIAGNOSIS — E78.1 HYPERTRIGLYCERIDEMIA: ICD-10-CM

## 2021-10-19 DIAGNOSIS — F41.9 ANXIETY: ICD-10-CM

## 2021-10-19 PROBLEM — F32.9 MAJOR DEPRESSION: Status: ACTIVE | Noted: 2018-04-04

## 2021-10-19 LAB — CREAT UR-MCNC: 68 MG/DL

## 2021-10-19 PROCEDURE — 90686 IIV4 VACC NO PRSV 0.5 ML IM: CPT

## 2021-10-19 PROCEDURE — G0463 HOSPITAL OUTPT CLINIC VISIT: HCPCS

## 2021-10-19 PROCEDURE — G0463 HOSPITAL OUTPT CLINIC VISIT: HCPCS | Mod: 25

## 2021-10-19 PROCEDURE — 80307 DRUG TEST PRSMV CHEM ANLYZR: CPT | Mod: ZL | Performed by: INTERNAL MEDICINE

## 2021-10-19 PROCEDURE — 0003A PR COVID VAC PFIZER DIL RECON 30 MCG/0.3 ML IM: CPT

## 2021-10-19 PROCEDURE — 82570 ASSAY OF URINE CREATININE: CPT | Mod: ZL | Performed by: INTERNAL MEDICINE

## 2021-10-19 PROCEDURE — 99214 OFFICE O/P EST MOD 30 MIN: CPT | Performed by: INTERNAL MEDICINE

## 2021-10-19 PROCEDURE — 82570 ASSAY OF URINE CREATININE: CPT | Mod: ZL,91 | Performed by: INTERNAL MEDICINE

## 2021-10-19 RX ORDER — DEXTROAMPHETAMINE SACCHARATE, AMPHETAMINE ASPARTATE MONOHYDRATE, DEXTROAMPHETAMINE SULFATE AND AMPHETAMINE SULFATE 7.5; 7.5; 7.5; 7.5 MG/1; MG/1; MG/1; MG/1
30 CAPSULE, EXTENDED RELEASE ORAL DAILY
Qty: 30 CAPSULE | Refills: 0 | Status: SHIPPED | OUTPATIENT
Start: 2021-10-19 | End: 2022-01-11

## 2021-10-19 RX ORDER — LORAZEPAM 0.5 MG/1
0.5 TABLET ORAL 2 TIMES DAILY PRN
Qty: 60 TABLET | Refills: 2 | Status: SHIPPED | OUTPATIENT
Start: 2021-10-19 | End: 2022-01-11

## 2021-10-19 RX ORDER — DEXTROAMPHETAMINE SACCHARATE, AMPHETAMINE ASPARTATE MONOHYDRATE, DEXTROAMPHETAMINE SULFATE AND AMPHETAMINE SULFATE 7.5; 7.5; 7.5; 7.5 MG/1; MG/1; MG/1; MG/1
30 CAPSULE, EXTENDED RELEASE ORAL DAILY
Qty: 30 CAPSULE | Refills: 0 | Status: SHIPPED | OUTPATIENT
Start: 2021-12-14 | End: 2022-01-11

## 2021-10-19 RX ORDER — DEXTROAMPHETAMINE SACCHARATE, AMPHETAMINE ASPARTATE MONOHYDRATE, DEXTROAMPHETAMINE SULFATE AND AMPHETAMINE SULFATE 7.5; 7.5; 7.5; 7.5 MG/1; MG/1; MG/1; MG/1
30 CAPSULE, EXTENDED RELEASE ORAL DAILY
Qty: 30 CAPSULE | Refills: 0 | Status: SHIPPED | OUTPATIENT
Start: 2021-11-16 | End: 2022-01-11

## 2021-10-19 ASSESSMENT — ANXIETY QUESTIONNAIRES
GAD7 TOTAL SCORE: 2
3. WORRYING TOO MUCH ABOUT DIFFERENT THINGS: NOT AT ALL
GAD7 TOTAL SCORE: 2
5. BEING SO RESTLESS THAT IT IS HARD TO SIT STILL: SEVERAL DAYS
2. NOT BEING ABLE TO STOP OR CONTROL WORRYING: NOT AT ALL
6. BECOMING EASILY ANNOYED OR IRRITABLE: NOT AT ALL
8. IF YOU CHECKED OFF ANY PROBLEMS, HOW DIFFICULT HAVE THESE MADE IT FOR YOU TO DO YOUR WORK, TAKE CARE OF THINGS AT HOME, OR GET ALONG WITH OTHER PEOPLE?: NOT DIFFICULT AT ALL
GAD7 TOTAL SCORE: 2
1. FEELING NERVOUS, ANXIOUS, OR ON EDGE: SEVERAL DAYS
7. FEELING AFRAID AS IF SOMETHING AWFUL MIGHT HAPPEN: NOT AT ALL
4. TROUBLE RELAXING: NOT AT ALL
7. FEELING AFRAID AS IF SOMETHING AWFUL MIGHT HAPPEN: NOT AT ALL

## 2021-10-19 ASSESSMENT — PATIENT HEALTH QUESTIONNAIRE - PHQ9
SUM OF ALL RESPONSES TO PHQ QUESTIONS 1-9: 1
10. IF YOU CHECKED OFF ANY PROBLEMS, HOW DIFFICULT HAVE THESE PROBLEMS MADE IT FOR YOU TO DO YOUR WORK, TAKE CARE OF THINGS AT HOME, OR GET ALONG WITH OTHER PEOPLE: NOT DIFFICULT AT ALL
SUM OF ALL RESPONSES TO PHQ QUESTIONS 1-9: 1

## 2021-10-19 ASSESSMENT — ENCOUNTER SYMPTOMS
COUGH: 0
ABDOMINAL PAIN: 0
SHORTNESS OF BREATH: 0
ROS SKIN COMMENTS: HAIR LOSS
BACK PAIN: 0
NERVOUS/ANXIOUS: 1
BRUISES/BLEEDS EASILY: 0
WOUND: 0
CHILLS: 0
FEVER: 0

## 2021-10-19 ASSESSMENT — PAIN SCALES - GENERAL: PAINLEVEL: NO PAIN (0)

## 2021-10-19 ASSESSMENT — MIFFLIN-ST. JEOR: SCORE: 1847.23

## 2021-10-19 NOTE — PROGRESS NOTES
Assessment & Plan       ICD-10-CM    1. Attention deficit hyperactivity disorder (ADHD), combined type  F90.2 amphetamine-dextroamphetamine (ADDERALL XR) 30 MG 24 hr capsule     amphetamine-dextroamphetamine (ADDERALL XR) 30 MG 24 hr capsule     amphetamine-dextroamphetamine (ADDERALL XR) 30 MG 24 hr capsule     LORazepam (ATIVAN) 0.5 MG tablet   2. Need for influenza vaccination  Z23 FLU SHOT 6 MOS - 50 YRS (FLUZONE)   3. Familial hypercholesterolemia - at least heterozygote  E78.01    4. Hypertriglyceridemia  E78.1    5. Anxiety  F41.9 LORazepam (ATIVAN) 0.5 MG tablet   6. Other long term (current) drug therapy - 4/9/2021 - Adderall and Ativan  Z79.899 amphetamine-dextroamphetamine (ADDERALL XR) 30 MG 24 hr capsule     amphetamine-dextroamphetamine (ADDERALL XR) 30 MG 24 hr capsule     amphetamine-dextroamphetamine (ADDERALL XR) 30 MG 24 hr capsule     LORazepam (ATIVAN) 0.5 MG tablet     Drug Confirmation Panel Urine with Creatinine     Drug Confirmation Panel Urine with Creatinine   Patient presents for medication management appointment, as well as follow-up cholesterol.    ADHD.  This is been a chronic issue.  Currently working full-time.  Doing well with Adderall.  Tolerating well without side effects.  Has been using medications appropriately.   website reviewed, No opioid use.  Uses lorazepam.  Helps with his panic attack issues which has been doing much better for him recently.  Prescriptions refilled x1 month with 2 refills.  Urine drug screen today.  Controlled substance agreement is up-to-date.    Flu shot today.    Familial hyperlipidemia.  Chronic.  Has history of severely elevated triglycerides.  These have improved but not yet at goal of less than 150.  Currently taking TriCor plus Lovaza +40 mg of Crestor.  With his last lab work, had significantly elevated liver enzymes.  This is also improved with his cholesterol improvement.  Overall doing well.  Tolerating her medications.  Continue current  "dosing.    Chronic anxiety, see above.  Continues with lorazepam.  Needs refills.     BMI:   Estimated body mass index is 28.83 kg/m  as calculated from the following:    Height as of this encounter: 1.796 m (5' 10.7\").    Weight as of this encounter: 93 kg (205 lb).     Work on weight loss and regular exercise    Return in about 12 weeks (around 1/11/2022) for - Med Refills - Controlled RX.    Jarad Salcido MD  [facility]    Subjective     Kike Hess is a 41 year old male who presents to clinic today for the following health issues:    HPI     Anxiety Follow-Up    How are you doing with your anxiety since your last visit? Improved slightly    Are you having other symptoms that might be associated with anxiety? No    Have you had a significant life event? No     Are you feeling depressed? No    Do you have any concerns with your use of alcohol or other drugs? No    Social History     Tobacco Use     Smoking status: Never Smoker     Smokeless tobacco: Never Used   Vaping Use     Vaping Use: Never used   Substance Use Topics     Alcohol use: Yes     Alcohol/week: 0.0 standard drinks     Comment: Alcoholic Drinks/day: ocassionally- daily     Drug use: No     ANNMARIE-7 SCORE 11/18/2020 7/1/2021 10/19/2021   Total Score - - 2 (minimal anxiety)   Total Score 5 0 2     PHQ 4/9/2021 7/1/2021 10/19/2021   PHQ-9 Total Score 0 0 1   Q9: Thoughts of better off dead/self-harm past 2 weeks Not at all Not at all Not at all     Last PHQ-9 10/19/2021   1.  Little interest or pleasure in doing things 0   2.  Feeling down, depressed, or hopeless 0   3.  Trouble falling or staying asleep, or sleeping too much 0   4.  Feeling tired or having little energy 1   5.  Poor appetite or overeating 0   6.  Feeling bad about yourself 0   7.  Trouble concentrating 0   8.  Moving slowly or restless 0   Q9: Thoughts of better off dead/self-harm past 2 weeks 0   PHQ-9 Total Score 1   Difficulty at work, home, or with " people -     ANNMARIE-7  10/19/2021   1. Feeling nervous, anxious, or on edge 1   2. Not being able to stop or control worrying 0   3. Worrying too much about different things 0   4. Trouble relaxing 0   5. Being so restless that it is hard to sit still 1   6. Becoming easily annoyed or irritable 0   7. Feeling afraid, as if something awful might happen 0   ANNMARIE-7 Total Score 2   If you checked any problems, how difficult have they made it for you to do your work, take care of things at home, or get along with other people? -         How many servings of fruits and vegetables do you eat daily?  2-3    On average, how many sweetened beverages do you drink each day (Examples: soda, juice, sweet tea, etc.  Do NOT count diet or artificially sweetened beverages)?   0    How many days per week do you exercise enough to make your heart beat faster? 6    How many minutes a day do you exercise enough to make your heart beat faster? 60 or more    How many days per week do you miss taking your medication? 0    Review of Systems   Constitutional: Negative for chills and fever.   HENT: Negative for congestion.    Eyes:        Intermittent corneal / eye irritation / inflammation   Respiratory: Negative for cough and shortness of breath.    Cardiovascular: Negative for chest pain.   Gastrointestinal: Negative for abdominal pain.   Genitourinary: Positive for impotence (intermittent).   Musculoskeletal: Negative for back pain.   Skin: Negative for rash and wound.        Hair loss   Allergic/Immunologic: Positive for environmental allergies.   Neurological: Negative for syncope.   Hematological: Does not bruise/bleed easily.   Psychiatric/Behavioral: The patient is nervous/anxious (improved with intermittent ativan).         ADHD - much better with adderall. Able to work full time.   All other systems reviewed and are negative.         Objective    /88 (BP Location: Left arm, Patient Position: Sitting)   Pulse 106   Temp 97.9  F  "(36.6  C) (Tympanic)   Resp 18   Ht 1.796 m (5' 10.7\")   Wt 93 kg (205 lb)   SpO2 96%   BMI 28.83 kg/m    Body mass index is 28.83 kg/m .  Physical Exam  Constitutional:       General: He is not in acute distress.     Appearance: He is well-developed. He is not diaphoretic.   HENT:      Head: Normocephalic and atraumatic.   Eyes:      General: No scleral icterus.     Conjunctiva/sclera: Conjunctivae normal.   Cardiovascular:      Rate and Rhythm: Normal rate and regular rhythm.      Pulses: Normal pulses.   Pulmonary:      Effort: Pulmonary effort is normal.      Breath sounds: Normal breath sounds.   Abdominal:      Palpations: Abdomen is soft.      Tenderness: There is no abdominal tenderness.   Musculoskeletal:         General: No deformity.      Cervical back: Neck supple.      Right lower leg: No edema.      Left lower leg: No edema.   Lymphadenopathy:      Cervical: No cervical adenopathy.   Skin:     General: Skin is warm and dry.      Findings: No rash.   Neurological:      Mental Status: He is alert and oriented to person, place, and time. Mental status is at baseline.   Psychiatric:         Mood and Affect: Mood normal.         Behavior: Behavior normal.        Recent Labs   Lab Test 10/15/21  0749 11/18/20  0816 07/14/20  1206 07/14/20  1206 05/24/19  0955 03/20/19  1313 08/22/18  1518 04/04/18  0844   A1C 6.0 5.7  --   --   --   --   --  5.5   LDL 77 Cannot estimate LDL when triglyceride exceeds 400 mg/dL  --   --  86  --    < > Cannot estimate LDL when triglyceride exceeds 400 mg/dL   HDL 54 29*  --   --  44  --    < > 50   TRIG 203* 1,453*  --   --  105  --    < > 692*   ALT 44 160*  --  118*  --    < >   < > 81*   CR 0.99 1.02   < > 0.98  --    < >   < > 0.97   GFRESTIMATED >90 >90   < > 84  --    < >   < > 87   GFRESTBLACK  --  >90  --  >90  --    < >   < > >90   POTASSIUM 4.3 5.3   < > 3.8  --    < >   < > 4.4   TSH 2.53  --   --   --   --   --   --   --     < > = values in this interval not " displayed.   Hemoglobin A1c is now normal, in the prediabetic range.  LDL has significantly improved, currently at goal of less than 100.  HDL is normal.  Triglycerides have improved but are still above goal.  ALT is normal.  Creatinine is normal.  Potassium and thyroid levels are normal.      Answers for HPI/ROS submitted by the patient on 10/19/2021  If you checked off any problems, how difficult have these problems made it for you to do your work, take care of things at home, or get along with other people?: Not difficult at all  PHQ9 TOTAL SCORE: 1  ANNMARIE 7 TOTAL SCORE: 2

## 2021-10-19 NOTE — PATIENT INSTRUCTIONS
Labs are much better.  Continue current cholesterol management.     Lab on 10/15/2021   Component Date Value Ref Range Status     TSH 10/15/2021 2.53  0.40 - 4.00 mU/L Final     Hemoglobin A1C 10/15/2021 6.0  4.0 - 6.2 % Final     WBC Count 10/15/2021 6.2  4.0 - 11.0 10e3/uL Final     RBC Count 10/15/2021 4.59  4.40 - 5.90 10e6/uL Final     Hemoglobin 10/15/2021 14.8  13.3 - 17.7 g/dL Final     Hematocrit 10/15/2021 44.2  40.0 - 53.0 % Final     MCV 10/15/2021 96  78 - 100 fL Final     MCH 10/15/2021 32.2  26.5 - 33.0 pg Final     MCHC 10/15/2021 33.5  31.5 - 36.5 g/dL Final     RDW 10/15/2021 13.0  10.0 - 15.0 % Final     Platelet Count 10/15/2021 145* 150 - 450 10e3/uL Final     Cholesterol 10/15/2021 172  <200 mg/dL Final     Triglycerides 10/15/2021 203* <150 mg/dL Final     Direct Measure HDL 10/15/2021 54  23 - 92 mg/dL Final     LDL Cholesterol Calculated 10/15/2021 77  <=100 mg/dL Final     Non HDL Cholesterol 10/15/2021 118  <130 mg/dL Final     Patient Fasting > 8hrs? 10/15/2021 Yes   Final     Sodium 10/15/2021 139  134 - 144 mmol/L Final     Potassium 10/15/2021 4.3  3.5 - 5.1 mmol/L Final     Chloride 10/15/2021 102  98 - 107 mmol/L Final     Carbon Dioxide (CO2) 10/15/2021 28  21 - 31 mmol/L Final     Anion Gap 10/15/2021 9  3 - 14 mmol/L Final     Urea Nitrogen 10/15/2021 23  7 - 25 mg/dL Final     Creatinine 10/15/2021 0.99  0.70 - 1.30 mg/dL Final     Calcium 10/15/2021 9.7  8.6 - 10.3 mg/dL Final     Glucose 10/15/2021 122* 70 - 105 mg/dL Final     Alkaline Phosphatase 10/15/2021 44  34 - 104 U/L Final     AST 10/15/2021 35  13 - 39 U/L Final     ALT 10/15/2021 44  7 - 52 U/L Final     Protein Total 10/15/2021 7.0  6.4 - 8.9 g/dL Final     Albumin 10/15/2021 4.6  3.5 - 5.7 g/dL Final     Bilirubin Total 10/15/2021 0.6  0.3 - 1.0 mg/dL Final     GFR Estimate 10/15/2021 >90  >60 mL/min/1.73m2 Final    As of July 11, 2021, eGFR is calculated by the CKD-EPI creatinine equation, without race adjustment.  eGFR can be influenced by muscle mass, exercise, and diet. The reported eGFR is an estimation only and is only applicable if the renal function is stable.        Recent Labs   Lab Test 10/15/21  0749 11/18/20  0816 07/14/20  1206 07/14/20  1206 05/24/19  0955 03/20/19  1313 08/22/18  1518 04/04/18  0844   A1C 6.0 5.7  --   --   --   --   --  5.5   LDL 77 Cannot estimate LDL when triglyceride exceeds 400 mg/dL  --   --  86  --    < > Cannot estimate LDL when triglyceride exceeds 400 mg/dL   HDL 54 29*  --   --  44  --    < > 50   TRIG 203* 1,453*  --   --  105  --    < > 692*   ALT 44 160*  --  118*  --    < >   < > 81*   CR 0.99 1.02   < > 0.98  --    < >   < > 0.97   GFRESTIMATED >90 >90   < > 84  --    < >   < > 87   GFRESTBLACK  --  >90  --  >90  --    < >   < > >90   POTASSIUM 4.3 5.3   < > 3.8  --    < >   < > 4.4   TSH 2.53  --   --   --   --   --   --   --     < > = values in this interval not displayed.       -- Pre-diabetes:    fasting glucose: 100 - 125    hemoglobin A1c: 5.7 - 6.4   -- Diabetes:    fasting glucose greater than 125    random glucose greater than 200    hemoglobin A1c: > or = 6.5     To help with weight loss and improve blood sugar control....    -- Try to avoid Carbohydrates as much as possible -- breads, pasta, baked goods, cakes, oatmeal, cold cereal, potatoes.   -- Eat more lean meats, proteins, eggs, nuts, vegetables.    -- Start or Continue regular daily exercise.        Return in approximately 12 weeks from today, or sooner as needed for follow-up with Dr. Salcido.  -- Med Refills - Controlled RX    - Bring your remaining pills / pill bottles with to: Each of your Med Management/refill  appointments for pill counts.   - Urine drug screens for controlled medications will be completed every 1 to 12 months.   - Random pill counts and urine drug testing may occur.   - No illicit drug use or pill sharing will be tolerated.     Clinic : [phone]  Appointment line: [phone]

## 2021-10-19 NOTE — NURSING NOTE
Patient presents to clinic today for medication follow up. He had labs done Friday, 10/15/21. He would like copies of those.    Medication reconciliation completed.    ACP on file? No, forms previously provided    FOOD SECURITY SCREENING QUESTIONS  Hunger Vital Signs:  Within the past 12 months we worried whether our food would run out before we got money to buy more. Never  Within the past 12 months the food we bought just didn't last and we didn't have money to get more. Never    Angi Wong CMA([facility])..................10/19/2021   9:54 AM        Immunization Documentation    Prior to Fluzone Immunization administration, verified patients identity using patient's name and date of birth. Please see IMMUNIZATIONS  and order for additional information.  Patient / Parent instructed to remain in clinic for 15 minutes and report any adverse reaction to staff immediately.    Was entire vial of medication used? Yes  Vial/Syringe: Syringe    Angi Wong CMA([facility]).............10/19/2021  10:49 AM

## 2021-10-20 ASSESSMENT — ANXIETY QUESTIONNAIRES: GAD7 TOTAL SCORE: 2

## 2021-10-20 ASSESSMENT — PATIENT HEALTH QUESTIONNAIRE - PHQ9: SUM OF ALL RESPONSES TO PHQ QUESTIONS 1-9: 1

## 2021-10-21 LAB
AMPHET UR CFM-MCNC: 4080 NG/ML
AMPHET/CREAT UR: 6000 NG/MG {CREAT}
CREAT UR-MCNC: 68 MG/DL
CREATININE URINE MG/DL  (SYNCED VALUE): 68 MG/DL
LORAZEPAM UR QL CFM: PRESENT

## 2021-10-21 NOTE — ADDENDUM NOTE
Addended by: YARIEL MORILLO on: 10/21/2021 01:20 PM     Modules accepted: Orders    
The patient was seen this afternoon for initial assessment of swallow function, at which time the patient was alert though nonverbal. Patient currently with supplemental O2 via NC in place. Audible upper airway breath sounds noted at baseline. 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 The patient was BIBEMS for "resp distress, made DNR/DNI in the field, found to be septic with fever, tachycardia, hypotensive, in respiratory distress req'ing BIPAP, now on high flow, vbg showing pH 7.38 w/ mild hypercapnia, admitted for hypoxic and hypercarbic resp failure presumed 2/2 aspiration pna." Recent CXR revealed, "There is no pleural effusion. There is no pneumothorax. No focal consolidation identified." Discussed results and recommendations from this evaluation with nursing and call out to MD.

## 2021-11-14 DIAGNOSIS — E78.1 HYPERTRIGLYCERIDEMIA: ICD-10-CM

## 2021-11-15 RX ORDER — FENOFIBRATE 145 MG/1
145 TABLET, COATED ORAL DAILY
Qty: 30 TABLET | Refills: 0 | Status: SHIPPED | OUTPATIENT
Start: 2021-11-15 | End: 2022-01-11

## 2021-11-15 NOTE — TELEPHONE ENCOUNTER
"Requested Prescriptions   Pending Prescriptions Disp Refills     fenofibrate (TRICOR) 145 MG tablet [Pharmacy Med Name: fenofibrate nanocrystallized 145 mg tablet] 90 tablet 3     Sig: Take 1 tablet (145 mg) by mouth daily       Fibrates Passed - 11/14/2021  8:02 AM        Passed - Lipid panel on file in past 12 months     Recent Labs   Lab Test 10/15/21  0749   CHOL 172   TRIG 203*   HDL 54   LDL 77   NHDL 118               Passed - No abnormal creatine kinase in past 12 months     No lab results found.             Passed - Recent (12 mo) or future (30 days) visit within the authorizing provider's specialty     Patient has had an office visit with the authorizing provider or a provider within the authorizing providers department within the previous 12 mos or has a future within next 30 days. See \"Patient Info\" tab in inbasket, or \"Choose Columns\" in Meds & Orders section of the refill encounter.              Passed - Medication is active on med list        Passed - Patient is age 18 or older           Refill done per RN refill protocol.  Patient will be due for a follow up prior to further refills.  No answer and unable to leave a voicemail.  Note sent to pharmacy.  Edita Holder RN......November 15, 2021...4:22 PM   "

## 2022-01-11 ENCOUNTER — OFFICE VISIT (OUTPATIENT)
Dept: INTERNAL MEDICINE | Facility: OTHER | Age: 42
End: 2022-01-11
Attending: INTERNAL MEDICINE
Payer: COMMERCIAL

## 2022-01-11 VITALS
DIASTOLIC BLOOD PRESSURE: 78 MMHG | OXYGEN SATURATION: 96 % | BODY MASS INDEX: 29.62 KG/M2 | HEART RATE: 115 BPM | WEIGHT: 200 LBS | TEMPERATURE: 97.3 F | RESPIRATION RATE: 18 BRPM | SYSTOLIC BLOOD PRESSURE: 133 MMHG | HEIGHT: 69 IN

## 2022-01-11 DIAGNOSIS — F41.9 ANXIETY: ICD-10-CM

## 2022-01-11 DIAGNOSIS — Z79.899 OTHER LONG TERM (CURRENT) DRUG THERAPY: ICD-10-CM

## 2022-01-11 DIAGNOSIS — F90.2 ATTENTION DEFICIT HYPERACTIVITY DISORDER (ADHD), COMBINED TYPE: ICD-10-CM

## 2022-01-11 DIAGNOSIS — N52.9 ERECTILE DYSFUNCTION, UNSPECIFIED ERECTILE DYSFUNCTION TYPE: ICD-10-CM

## 2022-01-11 DIAGNOSIS — E78.1 HYPERTRIGLYCERIDEMIA: ICD-10-CM

## 2022-01-11 DIAGNOSIS — Z00.00 ANNUAL PHYSICAL EXAM: ICD-10-CM

## 2022-01-11 DIAGNOSIS — E78.01 FAMILIAL HYPERCHOLESTEROLEMIA: Primary | ICD-10-CM

## 2022-01-11 DIAGNOSIS — Z71.85 VACCINE COUNSELING: ICD-10-CM

## 2022-01-11 DIAGNOSIS — I10 BENIGN ESSENTIAL HYPERTENSION: ICD-10-CM

## 2022-01-11 DIAGNOSIS — R12 HEARTBURN: ICD-10-CM

## 2022-01-11 DIAGNOSIS — J30.81 ALLERGIC RHINITIS DUE TO CAT HAIR: ICD-10-CM

## 2022-01-11 DIAGNOSIS — F10.20 UNCOMPLICATED ALCOHOL DEPENDENCE (H): ICD-10-CM

## 2022-01-11 DIAGNOSIS — R73.9 ELEVATED RANDOM BLOOD GLUCOSE LEVEL: ICD-10-CM

## 2022-01-11 DIAGNOSIS — G43.009 MIGRAINE WITHOUT AURA AND WITHOUT STATUS MIGRAINOSUS, NOT INTRACTABLE: ICD-10-CM

## 2022-01-11 DIAGNOSIS — K52.9 INFLAMMATORY BOWEL DISEASE: ICD-10-CM

## 2022-01-11 DIAGNOSIS — L65.9 HAIR LOSS: ICD-10-CM

## 2022-01-11 DIAGNOSIS — F32.5 MAJOR DEPRESSION IN COMPLETE REMISSION (H): ICD-10-CM

## 2022-01-11 PROCEDURE — 99396 PREV VISIT EST AGE 40-64: CPT | Performed by: INTERNAL MEDICINE

## 2022-01-11 PROCEDURE — G0463 HOSPITAL OUTPT CLINIC VISIT: HCPCS

## 2022-01-11 PROCEDURE — 99214 OFFICE O/P EST MOD 30 MIN: CPT | Mod: 25 | Performed by: INTERNAL MEDICINE

## 2022-01-11 RX ORDER — ROSUVASTATIN CALCIUM 40 MG/1
40 TABLET, COATED ORAL DAILY
Qty: 90 TABLET | Refills: 3 | Status: SHIPPED | OUTPATIENT
Start: 2022-01-11 | End: 2022-12-30

## 2022-01-11 RX ORDER — SUMATRIPTAN 50 MG/1
50 TABLET, FILM COATED ORAL
Qty: 6 TABLET | Refills: 1 | Status: SHIPPED | OUTPATIENT
Start: 2022-01-11 | End: 2023-11-16

## 2022-01-11 RX ORDER — ESCITALOPRAM OXALATE 10 MG/1
10 TABLET ORAL DAILY
Qty: 90 TABLET | Refills: 3 | Status: SHIPPED | OUTPATIENT
Start: 2022-01-11 | End: 2022-12-30

## 2022-01-11 RX ORDER — SILDENAFIL 100 MG/1
50-100 TABLET, FILM COATED ORAL DAILY PRN
Qty: 10 TABLET | Refills: 1 | Status: SHIPPED | OUTPATIENT
Start: 2022-01-11 | End: 2022-10-07

## 2022-01-11 RX ORDER — DEXTROAMPHETAMINE SACCHARATE, AMPHETAMINE ASPARTATE MONOHYDRATE, DEXTROAMPHETAMINE SULFATE AND AMPHETAMINE SULFATE 7.5; 7.5; 7.5; 7.5 MG/1; MG/1; MG/1; MG/1
30 CAPSULE, EXTENDED RELEASE ORAL DAILY
Qty: 30 CAPSULE | Refills: 0 | Status: SHIPPED | OUTPATIENT
Start: 2022-02-08 | End: 2022-07-06

## 2022-01-11 RX ORDER — DEXTROAMPHETAMINE SACCHARATE, AMPHETAMINE ASPARTATE MONOHYDRATE, DEXTROAMPHETAMINE SULFATE AND AMPHETAMINE SULFATE 7.5; 7.5; 7.5; 7.5 MG/1; MG/1; MG/1; MG/1
30 CAPSULE, EXTENDED RELEASE ORAL DAILY
Qty: 30 CAPSULE | Refills: 0 | Status: SHIPPED | OUTPATIENT
Start: 2022-03-08 | End: 2022-04-25

## 2022-01-11 RX ORDER — DEXTROAMPHETAMINE SACCHARATE, AMPHETAMINE ASPARTATE MONOHYDRATE, DEXTROAMPHETAMINE SULFATE AND AMPHETAMINE SULFATE 7.5; 7.5; 7.5; 7.5 MG/1; MG/1; MG/1; MG/1
30 CAPSULE, EXTENDED RELEASE ORAL DAILY
Qty: 30 CAPSULE | Refills: 0 | Status: SHIPPED | OUTPATIENT
Start: 2022-01-11 | End: 2022-07-06

## 2022-01-11 RX ORDER — AMLODIPINE BESYLATE 5 MG/1
5 TABLET ORAL DAILY
Qty: 90 TABLET | Refills: 3 | Status: SHIPPED | OUTPATIENT
Start: 2022-01-11 | End: 2022-12-30

## 2022-01-11 RX ORDER — FINASTERIDE 1 MG/1
1 TABLET, FILM COATED ORAL DAILY
Qty: 90 TABLET | Refills: 3 | Status: SHIPPED | OUTPATIENT
Start: 2022-01-11 | End: 2022-12-30

## 2022-01-11 RX ORDER — MESALAMINE 400 MG/1
800 CAPSULE, DELAYED RELEASE ORAL 4 TIMES DAILY PRN
Qty: 720 CAPSULE | Refills: 3 | Status: SHIPPED | OUTPATIENT
Start: 2022-01-11 | End: 2022-12-30

## 2022-01-11 RX ORDER — LORAZEPAM 0.5 MG/1
0.5 TABLET ORAL 2 TIMES DAILY PRN
Qty: 60 TABLET | Refills: 2 | Status: SHIPPED | OUTPATIENT
Start: 2022-01-11 | End: 2022-04-25

## 2022-01-11 RX ORDER — OMEGA-3-ACID ETHYL ESTERS 1 G/1
1 CAPSULE, LIQUID FILLED ORAL 4 TIMES DAILY
Qty: 360 CAPSULE | Refills: 3 | Status: SHIPPED | OUTPATIENT
Start: 2022-01-11 | End: 2022-12-30

## 2022-01-11 RX ORDER — LOSARTAN POTASSIUM 50 MG/1
50 TABLET ORAL DAILY
Qty: 90 TABLET | Refills: 3 | Status: SHIPPED | OUTPATIENT
Start: 2022-01-11 | End: 2022-06-07

## 2022-01-11 RX ORDER — FENOFIBRATE 145 MG/1
145 TABLET, COATED ORAL DAILY
Qty: 90 TABLET | Refills: 3 | Status: SHIPPED | OUTPATIENT
Start: 2022-01-11 | End: 2022-12-30

## 2022-01-11 ASSESSMENT — ANXIETY QUESTIONNAIRES
7. FEELING AFRAID AS IF SOMETHING AWFUL MIGHT HAPPEN: NOT AT ALL
GAD7 TOTAL SCORE: 0
2. NOT BEING ABLE TO STOP OR CONTROL WORRYING: NOT AT ALL
GAD7 TOTAL SCORE: 0
5. BEING SO RESTLESS THAT IT IS HARD TO SIT STILL: NOT AT ALL
3. WORRYING TOO MUCH ABOUT DIFFERENT THINGS: NOT AT ALL
GAD7 TOTAL SCORE: 0
7. FEELING AFRAID AS IF SOMETHING AWFUL MIGHT HAPPEN: NOT AT ALL
1. FEELING NERVOUS, ANXIOUS, OR ON EDGE: NOT AT ALL
4. TROUBLE RELAXING: NOT AT ALL
6. BECOMING EASILY ANNOYED OR IRRITABLE: NOT AT ALL

## 2022-01-11 ASSESSMENT — ENCOUNTER SYMPTOMS
ABDOMINAL PAIN: 0
NERVOUS/ANXIOUS: 1
BACK PAIN: 0
BRUISES/BLEEDS EASILY: 0
SHORTNESS OF BREATH: 0
ROS SKIN COMMENTS: HAIR LOSS
WOUND: 0
COUGH: 0
FEVER: 0
CHILLS: 0

## 2022-01-11 ASSESSMENT — PATIENT HEALTH QUESTIONNAIRE - PHQ9
SUM OF ALL RESPONSES TO PHQ QUESTIONS 1-9: 2
10. IF YOU CHECKED OFF ANY PROBLEMS, HOW DIFFICULT HAVE THESE PROBLEMS MADE IT FOR YOU TO DO YOUR WORK, TAKE CARE OF THINGS AT HOME, OR GET ALONG WITH OTHER PEOPLE: NOT DIFFICULT AT ALL
SUM OF ALL RESPONSES TO PHQ QUESTIONS 1-9: 2

## 2022-01-11 ASSESSMENT — PAIN SCALES - GENERAL: PAINLEVEL: NO PAIN (0)

## 2022-01-11 ASSESSMENT — MIFFLIN-ST. JEOR: SCORE: 1802.18

## 2022-01-11 NOTE — PROGRESS NOTES
Assessment & Plan       ICD-10-CM    1. Familial hypercholesterolemia - at least heterozygote  E78.01 omega-3 acid ethyl esters (LOVAZA) 1 g capsule     rosuvastatin (CRESTOR) 40 MG tablet   2. Annual physical exam  Z00.00    3. Uncomplicated alcohol dependence (H)  F10.20    4. Benign essential hypertension  I10 amLODIPine (NORVASC) 5 MG tablet     losartan (COZAAR) 50 MG tablet   5. Attention deficit hyperactivity disorder (ADHD), combined type  F90.2 amphetamine-dextroamphetamine (ADDERALL XR) 30 MG 24 hr capsule     amphetamine-dextroamphetamine (ADDERALL XR) 30 MG 24 hr capsule     amphetamine-dextroamphetamine (ADDERALL XR) 30 MG 24 hr capsule     LORazepam (ATIVAN) 0.5 MG tablet   6. Elevated random blood glucose level  R73.09    7. Other long term (current) drug therapy - 4/9/2021 - Adderall and Ativan  Z79.899 amphetamine-dextroamphetamine (ADDERALL XR) 30 MG 24 hr capsule     amphetamine-dextroamphetamine (ADDERALL XR) 30 MG 24 hr capsule     amphetamine-dextroamphetamine (ADDERALL XR) 30 MG 24 hr capsule     LORazepam (ATIVAN) 0.5 MG tablet   8. Major depression in complete remission (H)  F32.5 amitriptyline (ELAVIL) 25 MG tablet     escitalopram (LEXAPRO) 10 MG tablet   9. Hypertriglyceridemia  E78.1 fenofibrate (TRICOR) 145 MG tablet   10. Anxiety  F41.9 LORazepam (ATIVAN) 0.5 MG tablet   11. Inflammatory bowel disease -- not definitive of UC vs chrons based on blood work in 2006  K52.9 mesalamine (DELZICOL) 400 MG DR capsule   12. Allergic rhinitis due to cat hair  J30.81 budesonide (RINOCORT AQUA) 32 MCG/ACT nasal spray   13. Hair loss  L65.9 finasteride (PROPECIA) 1 MG tablet   14. Heartburn  R12 omeprazole (PRILOSEC) 20 MG DR capsule   15. Erectile dysfunction, unspecified erectile dysfunction type  N52.9 sildenafil (VIAGRA) 100 MG tablet   16. Migraine without aura and without status migrainosus, not intractable  G43.009 SUMAtriptan (IMITREX) 50 MG tablet   17. Vaccine counseling  Z71.85    Patient  presents for annual physical examination, as well as follow-up multiple issues.    Patient has uncomplicated alcohol dependency.  Reports that he drinks approximately 20 or 24 beers per week.  Previously 30+ beers per week.  He has cut down on his alcohol consumption.  No DUI/DWI issues.  CAGE questionnaire is negative.  Encouraged drinking responsibly.    Hypertension, blood pressure is currently well controlled.  Denies syncope or presyncope.  Doing well with losartan and amlodipine.  Needs refills.    Familial hyperlipidemia with very high triglyceride levels.  Cholesterol levels are not at goal.  Did get slight improvement in his triglycerides adding fenofibrate to his 40 mg of Crestor however his triglycerides still remain elevated.  Recommend restarting the 4000 mg of omega-3/fish oil per day.  Patient oil added to his med list (Lovaza).  Recent Labs   Lab Test 10/15/21  0749 11/18/20  0816   CHOL 172 287*   HDL 54 29*   LDL 77 Cannot estimate LDL when triglyceride exceeds 400 mg/dL   TRIG 203* 1,453*       ADD/ADHD.  Continues with Adderall 30 mg once daily.  Also takes lorazepam regularly.  Controlled substance agreement is up-to-date.   website reviewed.  No abnormal findings noted.  No opioid use.  Urine drug screen is up-to-date.  Proper medication use and misuse reviewed.  Patient has been using medications appropriately.  Prescriptions refilled x1 month with 2 refills.    Elevated random glucose, overweight, discussed need for reduced oral caloric intake.  Regular exercise.  Reduce carbohydrate intake.  Information printed and reviewed.    History of depression, currently in remission.  Doing very well with Lexapro and amitriptyline.  Needs refills.    Chronic anxiety, still taking lorazepam regularly.  See above.  Refill sent to pharmacy.    Inflammatory bowel disease.  Doing very well with mesalamine.  Tolerating without side effects.  Keeps his bowel movements headache controlled level.  Needs  refills.    Allergic rhinitis, ongoing, cat allergy. Continues with Rhinocort nasal spray.  Needs refills.    Hair loss, Propecia has been tolerated and effective.  He would like to refill.    Heartburn, chronic, ongoing.  Finds omeprazole very helpful.  Does have recurrent symptoms and need to take regularly.  Needs refills.    Erectile dysfunction, chronic.  Still using sildenafil as needed.  Finds very effective.  No side effects reported.  Needs refills.    Migraine headaches, intermittent.  Has not had one in over a year.  He would like to have a new prescription on hand of Imitrex.    Vaccine counseling completed.  Consider pneumococcal vaccination, otherwise currently everything is up-to-date.  Encouraged COVID booster when available.     Encouraged weight loss and regular exercise    Return in about 12 weeks (around 4/5/2022) for -- Controlled medication refills..    Jarad Salcido MD  [facility] AND [facility]    Subjective   Mayte is a 41 year old who presents for the following: physical.   Healthy Habits:     Getting at least 3 servings of Calcium per day:  Yes    Bi-annual eye exam:  Yes    Dental care twice a year:  Yes    Sleep apnea or symptoms of sleep apnea:  None    Diet:  Low fat/cholesterol    Frequency of exercise:  2-3 days/week    Duration of exercise:  45-60 minutes    Taking medications regularly:  Yes    Medication side effects:  None    PHQ-2 Total Score: 0    Additional concerns today:  No       Do you have sleep apnea, excessive snoring or daytime drowsiness?: no    Current providers sharing in care for this patient include: {  Patient Care Team:  Jarad Salcido MD as PCP - General (Internal Medicine)  Jarad Salcido MD as Assigned PCP  Julio Perea DO as Assigned Heart and Vascular Provider    Patient has been advised of split billing requirements and indicates understanding: Yes     Review of Systems   Constitutional: Negative for chills and fever.   HENT: Negative for  "congestion.    Eyes:        Intermittent corneal / eye irritation / inflammation   Respiratory: Negative for cough and shortness of breath.    Cardiovascular: Negative for chest pain.   Gastrointestinal: Negative for abdominal pain.   Genitourinary: Positive for impotence (intermittent).   Musculoskeletal: Negative for back pain.   Skin: Negative for rash and wound.        Hair loss   Allergic/Immunologic: Positive for environmental allergies.   Neurological: Negative for syncope.   Hematological: Does not bruise/bleed easily.   Psychiatric/Behavioral: The patient is nervous/anxious (improved with intermittent ativan).         ADHD - much better with adderall. Able to work full time.   All other systems reviewed and are negative.         Objective    /78 (BP Location: Right arm, Patient Position: Sitting, Cuff Size: Adult Regular)   Pulse 115   Temp 97.3  F (36.3  C) (Tympanic)   Resp 18   Ht 1.76 m (5' 9.29\")   Wt 90.7 kg (200 lb)   SpO2 96%   BMI 29.29 kg/m    Body mass index is 29.29 kg/m .  Physical Exam  Constitutional:       General: He is not in acute distress.     Appearance: He is well-developed. He is not diaphoretic.   HENT:      Head: Normocephalic and atraumatic.   Eyes:      General: No scleral icterus.     Conjunctiva/sclera: Conjunctivae normal.   Cardiovascular:      Rate and Rhythm: Normal rate and regular rhythm.      Pulses: Normal pulses.   Pulmonary:      Effort: Pulmonary effort is normal.      Breath sounds: Normal breath sounds.   Abdominal:      Palpations: Abdomen is soft.      Tenderness: There is no abdominal tenderness.   Musculoskeletal:         General: No deformity.      Cervical back: Neck supple.      Right lower leg: No edema.      Left lower leg: No edema.   Lymphadenopathy:      Cervical: No cervical adenopathy.   Skin:     General: Skin is warm and dry.      Findings: No rash.   Neurological:      Mental Status: He is alert and oriented to person, place, and time. " "Mental status is at baseline.   Psychiatric:         Mood and Affect: Mood normal.         Behavior: Behavior normal.              Last PHQ-9 1/11/2022   1.  Little interest or pleasure in doing things 0   2.  Feeling down, depressed, or hopeless 1   3.  Trouble falling or staying asleep, or sleeping too much 0   4.  Feeling tired or having little energy 1   5.  Poor appetite or overeating 0   6.  Feeling bad about yourself 0   7.  Trouble concentrating 0   8.  Moving slowly or restless 0   Q9: Thoughts of better off dead/self-harm past 2 weeks 0   PHQ-9 Total Score 2   Difficulty at work, home, or with people -     ANNMARIE-7  1/11/2022   1. Feeling nervous, anxious, or on edge 0   2. Not being able to stop or control worrying 0   3. Worrying too much about different things 0   4. Trouble relaxing 0   5. Being so restless that it is hard to sit still 0   6. Becoming easily annoyed or irritable 0   7. Feeling afraid, as if something awful might happen 0   ANNMARIE-7 Total Score 0   If you checked any problems, how difficult have they made it for you to do your work, take care of things at home, or get along with other people? -     Chief Complaint   Patient presents with     Physical     Recheck Medication     recheck and refills        Initial /78 (BP Location: Right arm, Patient Position: Sitting, Cuff Size: Adult Regular)   Pulse 115   Temp 97.3  F (36.3  C) (Tympanic)   Resp 18   Ht 1.76 m (5' 9.29\")   Wt 90.7 kg (200 lb)   SpO2 96%   BMI 29.29 kg/m   Estimated body mass index is 29.29 kg/m  as calculated from the following:    Height as of this encounter: 1.76 m (5' 9.29\").    Weight as of this encounter: 90.7 kg (200 lb).  Medication Reconciliation: complete    Jarad Salcido MD      "

## 2022-01-11 NOTE — PATIENT INSTRUCTIONS
Blood pressure is well controlled.     Triglycerides have improved.     Medications refilled.     Otherwise Labs are stable.     Glad your bowels are doing better.     Return in approximately 12 week(s), or sooner as needed for follow-up with Dr. Salcido.  -- Controlled medication refills.     Clinic : [phone]  Appointment line: [phone]

## 2022-01-11 NOTE — NURSING NOTE
"Chief Complaint   Patient presents with     Physical     Recheck Medication     recheck and refills      Patient presents to the clinic today for physical.      Initial /78 (BP Location: Right arm, Patient Position: Sitting, Cuff Size: Adult Regular)   Pulse 115   Temp 97.3  F (36.3  C) (Tympanic)   Resp 18   Ht 1.76 m (5' 9.29\")   Wt 90.7 kg (200 lb)   SpO2 96%   BMI 29.29 kg/m   Estimated body mass index is 29.29 kg/m  as calculated from the following:    Height as of this encounter: 1.76 m (5' 9.29\").    Weight as of this encounter: 90.7 kg (200 lb).  Medication Reconciliation: complete      FOOD SECURITY SCREENING QUESTIONS:    The next two questions are to help us understand your food security.  If you are feeling you need any assistance in this area, we have resources available to support you today.    Hunger Vital Signs:  Within the past 12 months we worried whether our food would run out before we got money to buy more. Never  Within the past 12 months the food we bought just didn't last and we didn't have money to get more. Never    Adenike Vega RN.......1/11/20221:41 PM   "

## 2022-01-12 ASSESSMENT — PATIENT HEALTH QUESTIONNAIRE - PHQ9: SUM OF ALL RESPONSES TO PHQ QUESTIONS 1-9: 2

## 2022-01-12 ASSESSMENT — ANXIETY QUESTIONNAIRES: GAD7 TOTAL SCORE: 0

## 2022-03-09 DIAGNOSIS — T75.3XXD MOTION SICKNESS, SUBSEQUENT ENCOUNTER: ICD-10-CM

## 2022-03-10 RX ORDER — SCOLOPAMINE TRANSDERMAL SYSTEM 1 MG/1
PATCH, EXTENDED RELEASE TRANSDERMAL
Qty: 10 PATCH | Refills: 11 | Status: SHIPPED | OUTPATIENT
Start: 2022-03-10 | End: 2022-12-30

## 2022-03-10 NOTE — TELEPHONE ENCOUNTER
Routing refill request to provider for review/approval because:    LOV: 1/11/20222    Grecia Harris RN on 3/10/2022 at 2:38 PM

## 2022-04-25 ENCOUNTER — OFFICE VISIT (OUTPATIENT)
Dept: INTERNAL MEDICINE | Facility: OTHER | Age: 42
End: 2022-04-25
Attending: INTERNAL MEDICINE
Payer: COMMERCIAL

## 2022-04-25 VITALS
BODY MASS INDEX: 27.18 KG/M2 | DIASTOLIC BLOOD PRESSURE: 88 MMHG | SYSTOLIC BLOOD PRESSURE: 138 MMHG | HEART RATE: 120 BPM | WEIGHT: 185.6 LBS | TEMPERATURE: 96.5 F | RESPIRATION RATE: 18 BRPM | OXYGEN SATURATION: 98 %

## 2022-04-25 DIAGNOSIS — Z79.899 OTHER LONG TERM (CURRENT) DRUG THERAPY: ICD-10-CM

## 2022-04-25 DIAGNOSIS — F41.9 ANXIETY: ICD-10-CM

## 2022-04-25 DIAGNOSIS — F90.2 ATTENTION DEFICIT HYPERACTIVITY DISORDER (ADHD), COMBINED TYPE: ICD-10-CM

## 2022-04-25 PROCEDURE — 99213 OFFICE O/P EST LOW 20 MIN: CPT | Performed by: INTERNAL MEDICINE

## 2022-04-25 PROCEDURE — G0463 HOSPITAL OUTPT CLINIC VISIT: HCPCS

## 2022-04-25 PROCEDURE — 91305 COVID-19,PF,PFIZER (12+ YRS): CPT

## 2022-04-25 PROCEDURE — G0463 HOSPITAL OUTPT CLINIC VISIT: HCPCS | Mod: 25

## 2022-04-25 RX ORDER — LORAZEPAM 0.5 MG/1
0.5 TABLET ORAL 2 TIMES DAILY PRN
Qty: 60 TABLET | Refills: 2 | Status: SHIPPED | OUTPATIENT
Start: 2022-04-25 | End: 2022-07-06

## 2022-04-25 RX ORDER — DEXTROAMPHETAMINE SACCHARATE, AMPHETAMINE ASPARTATE MONOHYDRATE, DEXTROAMPHETAMINE SULFATE AND AMPHETAMINE SULFATE 7.5; 7.5; 7.5; 7.5 MG/1; MG/1; MG/1; MG/1
30 CAPSULE, EXTENDED RELEASE ORAL DAILY
Qty: 30 CAPSULE | Refills: 0 | Status: SHIPPED | OUTPATIENT
Start: 2022-04-25 | End: 2022-06-07

## 2022-04-25 ASSESSMENT — ENCOUNTER SYMPTOMS
EYES NEGATIVE: 1
ALLERGIC/IMMUNOLOGIC NEGATIVE: 1
CONSTITUTIONAL NEGATIVE: 1
ENDOCRINE NEGATIVE: 1

## 2022-04-25 ASSESSMENT — PAIN SCALES - GENERAL: PAINLEVEL: NO PAIN (0)

## 2022-04-25 NOTE — PROGRESS NOTES
{PROVIDER CHARTING PREFERENCE:257863}    Subjective   Mayte is a 41 year old who presents for the following health issues {ACCOMPANIED BY STATEMENT (Optional):173249}    HPI     {SUPERLIST (Optional):387440}  {additonal problems for provider to add (Optional):875508}    Review of Systems   {ROS COMP (Optional):028523}      Objective    /88   Pulse 120   Temp (!) 96.5  F (35.8  C) (Tympanic)   Resp 18   Wt 84.2 kg (185 lb 9.6 oz)   SpO2 98%   BMI 27.18 kg/m    Body mass index is 27.18 kg/m .  Physical Exam   {Exam List (Optional):214317}    {Diagnostic Test Results (Optional):930847}    {AMBULATORY ATTESTATION (Optional):046745}

## 2022-04-25 NOTE — NURSING NOTE
"Chief Complaint   Patient presents with     Recheck Medication       Initial /88   Pulse 120   Temp (!) 96.5  F (35.8  C) (Tympanic)   Resp 18   Wt 84.2 kg (185 lb 9.6 oz)   SpO2 98%   BMI 27.18 kg/m   Estimated body mass index is 27.18 kg/m  as calculated from the following:    Height as of 1/11/22: 1.76 m (5' 9.29\").    Weight as of this encounter: 84.2 kg (185 lb 9.6 oz).  FOOD SECURITY SCREENING QUESTIONS  Hunger Vital Signs:  Within the past 12 months we worried whether our food would run out before we got money to buy more. Never  Within the past 12 months the food we bought just didn't last and we didn't have money to get more. Never        Eduardo Butler, MARIA E    "

## 2022-04-25 NOTE — PROGRESS NOTES
Chief Complaint:  Refill medication.    HPI: He is in today requesting a refill on medications.  He is treated for attention deficit disorder.  He is on Adderall and he also takes lorazepam for anxiety.  His last narcotic screen was done in October 2021 and looked acceptable.  [facility] website review was done and looks fine.  He has no other complaints or concerns other than requesting to get a COVID booster.  His other medications are up-to-date.    Past Medical History:   Diagnosis Date     Allergic rhinitis due to animal hair and dander     12/10/2013     Gastro-esophageal reflux disease without esophagitis     12/10/2013     Major depressive disorder, single episode     12/10/2013,Previously followed with Psychiatry - was on Remeron, Adderall, Lexapro in the past. Awaiting to re-establish psychiatry care again.     Migraine without status migrainosus, not intractable     12/10/2013     Other allergic rhinitis     12/10/2013     Other seasonal allergic rhinitis     12/10/2013     Sleep disorder     12/10/2013     Ulcerative colitis without complications (H)     8/9/2007       Past Surgical History:   Procedure Laterality Date     APPENDECTOMY OPEN      2/4/13,Dr. Givens/[facility]       Allergies   Allergen Reactions     Cats      Other reaction(s): Runny Nose  Itchy eyes and hand swelling     Food      Other reaction(s): Angioedema  Raw Parsnip AND Raw Carrots     Lisinopril Cough       Current Outpatient Medications   Medication Sig Dispense Refill     amitriptyline (ELAVIL) 25 MG tablet Take 2 tablets (50 mg) by mouth At Bedtime 60 tablet 3     amLODIPine (NORVASC) 5 MG tablet Take 1 tablet (5 mg) by mouth daily 90 tablet 3     amphetamine-dextroamphetamine (ADDERALL XR) 30 MG 24 hr capsule Take 1 capsule (30 mg) by mouth daily 30 capsule 0     amphetamine-dextroamphetamine (ADDERALL XR) 30 MG 24 hr capsule Take 1 capsule (30 mg) by mouth daily 30 capsule 0     amphetamine-dextroamphetamine (ADDERALL XR) 30 MG 24 hr  capsule Take 1 capsule (30 mg) by mouth daily 30 capsule 0     budesonide (RINOCORT AQUA) 32 MCG/ACT nasal spray Inhale 1 Spray into both nostrils once daily. 8.4 mL 11     escitalopram (LEXAPRO) 10 MG tablet Take 1 tablet (10 mg) by mouth daily 90 tablet 3     fenofibrate (TRICOR) 145 MG tablet Take 1 tablet (145 mg) by mouth daily 90 tablet 3     finasteride (PROPECIA) 1 MG tablet Take 1 tablet (1 mg) by mouth daily 90 tablet 3     LORazepam (ATIVAN) 0.5 MG tablet Take 1 tablet (0.5 mg) by mouth 2 times daily as needed for anxiety 60 tablet 2     losartan (COZAAR) 50 MG tablet Take 1 tablet (50 mg) by mouth daily 90 tablet 3     mesalamine (DELZICOL) 400 MG DR capsule Take 2 capsules (800 mg) by mouth 4 times daily as needed 720 capsule 3     methylPREDNISolone (MEDROL) 4 MG tablet TAKE AS DIRECTED FOR 10 DAYS FOR ULCERATIVE COLITIS FLAIR. REPEAT EVERY 4 WEEKS IF NEEDED. MAXIMUM OF 40 TABLETS MONTHLY 40 tablet 11     omega-3 acid ethyl esters (LOVAZA) 1 g capsule Take 1 capsule (1 g) by mouth 4 times daily 360 capsule 3     omeprazole (PRILOSEC) 20 MG DR capsule Take 1 capsule (20 mg) by mouth daily 30-60 minutes prior to 1st meal of the day - limit use as able 90 capsule 3     rosuvastatin (CRESTOR) 40 MG tablet Take 1 tablet (40 mg) by mouth daily 90 tablet 3     scopolamine (TRANSDERM) 1 MG/3DAYS 72 hr patch Place 1 patch onto the skin every 72 hours on dry, clean, hairless skin every 72 hours. For Motion Sickness 10 patch 11     sildenafil (VIAGRA) 100 MG tablet Take 0.5-1 tablets ( mg) by mouth daily as needed (erectile dyfunction) 10 tablet 1     sulfacetamide (BLEPH-10) 10 % ophthalmic solution Apply 1 drop to eye every 4 hours (while awake) 15 mL 3     SUMAtriptan (IMITREX) 50 MG tablet Take 1 tablet (50 mg) by mouth at onset of headache for migraine 6 tablet 1       Review of Systems   Constitutional: Negative.    Eyes: Negative.    Endocrine: Negative.    Allergic/Immunologic: Negative.         Physical Exam  Vitals and nursing note reviewed.   Constitutional:       General: He is not in acute distress.     Appearance: Normal appearance. He is not ill-appearing, toxic-appearing or diaphoretic.   Neurological:      Mental Status: He is alert.         Assessment:        ICD-10-CM    1. Attention deficit hyperactivity disorder (ADHD), combined type  F90.2 amphetamine-dextroamphetamine (ADDERALL XR) 30 MG 24 hr capsule     LORazepam (ATIVAN) 0.5 MG tablet   2. Other long term (current) drug therapy - 4/9/2021 - Adderall and Ativan  Z79.899 amphetamine-dextroamphetamine (ADDERALL XR) 30 MG 24 hr capsule     LORazepam (ATIVAN) 0.5 MG tablet   3. Anxiety  F41.9 LORazepam (ATIVAN) 0.5 MG tablet       Plan: His Adderall and lorazepam are refilled without change.  He will need a follow-up visit with his primary physician in 3 months.  COVID booster given today.  Follow-up sooner if there are problems.

## 2022-05-26 DIAGNOSIS — Z79.899 OTHER LONG TERM (CURRENT) DRUG THERAPY: ICD-10-CM

## 2022-05-26 DIAGNOSIS — F90.2 ATTENTION DEFICIT HYPERACTIVITY DISORDER (ADHD), COMBINED TYPE: ICD-10-CM

## 2022-05-27 NOTE — TELEPHONE ENCOUNTER
[facility] sent Rx request for the following:      Requested Prescriptions   Pending Prescriptions Disp Refills     amphetamine-dextroamphetamine (ADDERALL XR) 30 MG 24 hr capsule [Pharmacy Med Name: dextroamphetamine-amphetamine ER 30 mg 24hr capsule,extend release] 30 capsule 0     Sig: Take 1 capsule (30 mg) by mouth daily   Last Prescription Date:   4/25/22  Last Fill Qty/Refills:         30, R-0    Last Office Visit:              4/25/22   Future Office visit:           None    Per LOV note:  Plan: His Adderall and lorazepam are refilled without change.  He will need a follow-up visit with his primary physician in 3 months (around 7/25/22).    Germania Delgado RN .............. 5/27/2022  3:04 PM

## 2022-05-31 NOTE — TELEPHONE ENCOUNTER
PCP response:      4/25 Dr. Orellana appt - only filled x1 month, but told to follow-up in 3 months     Perhaps Dr. Orellana would fix this Rx mistake     Will Send to Dr. Orellana for review and consideration. Aline Martin RN  ....................  5/31/2022   8:36 AM

## 2022-06-02 RX ORDER — DEXTROAMPHETAMINE SACCHARATE, AMPHETAMINE ASPARTATE MONOHYDRATE, DEXTROAMPHETAMINE SULFATE AND AMPHETAMINE SULFATE 7.5; 7.5; 7.5; 7.5 MG/1; MG/1; MG/1; MG/1
30 CAPSULE, EXTENDED RELEASE ORAL DAILY
Qty: 30 CAPSULE | Refills: 0 | OUTPATIENT
Start: 2022-06-02

## 2022-06-07 ENCOUNTER — OFFICE VISIT (OUTPATIENT)
Dept: INTERNAL MEDICINE | Facility: OTHER | Age: 42
End: 2022-06-07
Attending: STUDENT IN AN ORGANIZED HEALTH CARE EDUCATION/TRAINING PROGRAM
Payer: COMMERCIAL

## 2022-06-07 VITALS
HEIGHT: 70 IN | BODY MASS INDEX: 27.75 KG/M2 | DIASTOLIC BLOOD PRESSURE: 108 MMHG | HEART RATE: 91 BPM | OXYGEN SATURATION: 97 % | TEMPERATURE: 97.8 F | WEIGHT: 193.8 LBS | RESPIRATION RATE: 20 BRPM | SYSTOLIC BLOOD PRESSURE: 146 MMHG

## 2022-06-07 DIAGNOSIS — Z79.899 OTHER LONG TERM (CURRENT) DRUG THERAPY: ICD-10-CM

## 2022-06-07 DIAGNOSIS — F90.2 ATTENTION DEFICIT HYPERACTIVITY DISORDER (ADHD), COMBINED TYPE: ICD-10-CM

## 2022-06-07 DIAGNOSIS — I10 BENIGN ESSENTIAL HYPERTENSION: ICD-10-CM

## 2022-06-07 PROCEDURE — G0463 HOSPITAL OUTPT CLINIC VISIT: HCPCS

## 2022-06-07 PROCEDURE — 99214 OFFICE O/P EST MOD 30 MIN: CPT | Performed by: STUDENT IN AN ORGANIZED HEALTH CARE EDUCATION/TRAINING PROGRAM

## 2022-06-07 RX ORDER — DEXTROAMPHETAMINE SACCHARATE, AMPHETAMINE ASPARTATE MONOHYDRATE, DEXTROAMPHETAMINE SULFATE AND AMPHETAMINE SULFATE 7.5; 7.5; 7.5; 7.5 MG/1; MG/1; MG/1; MG/1
30 CAPSULE, EXTENDED RELEASE ORAL DAILY
Qty: 30 CAPSULE | Refills: 0 | Status: SHIPPED | OUTPATIENT
Start: 2022-06-07 | End: 2022-07-06

## 2022-06-07 RX ORDER — LORAZEPAM 0.5 MG/1
0.5 TABLET ORAL 2 TIMES DAILY PRN
Qty: 60 TABLET | Refills: 2 | Status: CANCELLED | OUTPATIENT
Start: 2022-06-07

## 2022-06-07 RX ORDER — LOSARTAN POTASSIUM 100 MG/1
100 TABLET ORAL DAILY
Qty: 90 TABLET | Refills: 3 | Status: SHIPPED | OUTPATIENT
Start: 2022-06-07 | End: 2022-12-30

## 2022-06-07 ASSESSMENT — PAIN SCALES - GENERAL: PAINLEVEL: NO PAIN (0)

## 2022-06-07 NOTE — PROGRESS NOTES
"      Natty Hu is a 42 year old who presents for the following health issues     History of Present Illness       Reason for visit:  Medication refill    He eats 4 or more servings of fruits and vegetables daily.He consumes 1 sweetened beverage(s) daily.He exercises with enough effort to increase his heart rate 60 or more minutes per day.  He exercises with enough effort to increase his heart rate 6 days per week.   He is taking medications regularly.       Refill ADD medication  Due for refill, PCP out of the office.  Understands that further refills cannot be obtained for me and will need to be given by his primary.    Blood pressure elevated today.   Stress at work.   On amlodipine and losartan    Works at [employer]    Review of Systems         Objective    BP (!) 146/108 (BP Location: Right arm, Patient Position: Sitting, Cuff Size: Adult Regular)   Pulse 91   Temp 97.8  F (36.6  C) (Temporal)   Resp 20   Ht 1.778 m (5' 10\")   Wt 87.9 kg (193 lb 12.8 oz)   SpO2 97%   BMI 27.81 kg/m    Body mass index is 27.81 kg/m .  Physical Exam   General: Pleasant 39-year-old man sitting in clinic no acute distress    Assessment & Plan       ICD-10-CM    1. Attention deficit hyperactivity disorder (ADHD), combined type  F90.2 amphetamine-dextroamphetamine (ADDERALL XR) 30 MG 24 hr capsule   2. Other long term (current) drug therapy - 4/9/2021 - Adderall and Ativan  Z79.899 amphetamine-dextroamphetamine (ADDERALL XR) 30 MG 24 hr capsule   3. Benign essential hypertension  I10 losartan (COZAAR) 100 MG tablet     Basic Metabolic Panel     ADHD: Reviewed  given a 30-day prescription for his Adderall.  Further refills need to come from PCP.  He did not request any refill of his lorazepam.    Hypertension: Blood pressure slightly elevated at 146/108 today, increase stress but blood pressure has continued to be in the 130s+ over the course of the last year.  Refill his losartan at 100 mg from 50 mg and return " to clinic for a lab only appointment in 2 weeks.  Follow-up with PCP for further controlled substance refills in 1 month.    Encouraged weight loss and regular exercise    No follow-ups on file.    Jeet Ruiz MD  [facility] AND [facility]

## 2022-06-07 NOTE — NURSING NOTE
"Chief Complaint   Patient presents with     Recheck Medication       Medication reconciliation completed.    FOOD SECURITY SCREENING QUESTIONS:    The next two questions are to help us understand your food security.  If you are feeling you need any assistance in this area, we have resources available to support you today.    Hunger Vital Signs:  Within the past 12 months we worried whether our food would run out before we got money to buy more. Never  Within the past 12 months the food we bought just didn't last and we didn't have money to get more. Never    Initial BP (!) 146/108 (BP Location: Right arm, Patient Position: Sitting, Cuff Size: Adult Regular)   Pulse 91   Temp 97.8  F (36.6  C) (Temporal)   Resp 20   Ht 1.778 m (5' 10\")   Wt 87.9 kg (193 lb 12.8 oz)   SpO2 97%   BMI 27.81 kg/m   Estimated body mass index is 27.81 kg/m  as calculated from the following:    Height as of this encounter: 1.778 m (5' 10\").    Weight as of this encounter: 87.9 kg (193 lb 12.8 oz).       Alana Chamorro LPN .......  6/7/2022  11:20 AM  "

## 2022-07-06 ENCOUNTER — OFFICE VISIT (OUTPATIENT)
Dept: INTERNAL MEDICINE | Facility: OTHER | Age: 42
End: 2022-07-06
Attending: INTERNAL MEDICINE
Payer: COMMERCIAL

## 2022-07-06 VITALS
HEART RATE: 99 BPM | RESPIRATION RATE: 16 BRPM | DIASTOLIC BLOOD PRESSURE: 80 MMHG | WEIGHT: 190 LBS | HEIGHT: 71 IN | TEMPERATURE: 98.6 F | OXYGEN SATURATION: 97 % | SYSTOLIC BLOOD PRESSURE: 138 MMHG | BODY MASS INDEX: 26.6 KG/M2

## 2022-07-06 DIAGNOSIS — R73.9 ELEVATED RANDOM BLOOD GLUCOSE LEVEL: ICD-10-CM

## 2022-07-06 DIAGNOSIS — F41.9 ANXIETY: ICD-10-CM

## 2022-07-06 DIAGNOSIS — F90.2 ATTENTION DEFICIT HYPERACTIVITY DISORDER (ADHD), COMBINED TYPE: Primary | ICD-10-CM

## 2022-07-06 DIAGNOSIS — K52.9 INFLAMMATORY BOWEL DISEASE: ICD-10-CM

## 2022-07-06 DIAGNOSIS — I10 BENIGN ESSENTIAL HYPERTENSION: ICD-10-CM

## 2022-07-06 DIAGNOSIS — R79.89 ELEVATED LFTS: ICD-10-CM

## 2022-07-06 DIAGNOSIS — E78.01 FAMILIAL HYPERCHOLESTEROLEMIA: ICD-10-CM

## 2022-07-06 DIAGNOSIS — E78.1 HYPERTRIGLYCERIDEMIA: ICD-10-CM

## 2022-07-06 DIAGNOSIS — Z79.899 OTHER LONG TERM (CURRENT) DRUG THERAPY: ICD-10-CM

## 2022-07-06 LAB
ALBUMIN SERPL-MCNC: 4.4 G/DL (ref 3.5–5.7)
ALP SERPL-CCNC: 51 U/L (ref 34–104)
ALT SERPL W P-5'-P-CCNC: 54 U/L (ref 7–52)
ANION GAP SERPL CALCULATED.3IONS-SCNC: 8 MMOL/L (ref 3–14)
AST SERPL W P-5'-P-CCNC: 64 U/L (ref 13–39)
BILIRUB SERPL-MCNC: 0.7 MG/DL (ref 0.3–1)
BUN SERPL-MCNC: 19 MG/DL (ref 7–25)
CALCIUM SERPL-MCNC: 9.3 MG/DL (ref 8.6–10.3)
CHLORIDE BLD-SCNC: 104 MMOL/L (ref 98–107)
CHOLEST SERPL-MCNC: 182 MG/DL
CO2 SERPL-SCNC: 25 MMOL/L (ref 21–31)
CREAT SERPL-MCNC: 0.84 MG/DL (ref 0.7–1.3)
ERYTHROCYTE [DISTWIDTH] IN BLOOD BY AUTOMATED COUNT: 12.5 % (ref 10–15)
FASTING STATUS PATIENT QL REPORTED: NO
GFR SERPL CREATININE-BSD FRML MDRD: >90 ML/MIN/1.73M2
GLUCOSE BLD-MCNC: 113 MG/DL (ref 70–105)
HBA1C MFR BLD: 5.9 % (ref 4–6.2)
HCT VFR BLD AUTO: 44.7 % (ref 40–53)
HDLC SERPL-MCNC: 64 MG/DL (ref 23–92)
HGB BLD-MCNC: 14.9 G/DL (ref 13.3–17.7)
LDLC SERPL CALC-MCNC: 76 MG/DL
MCH RBC QN AUTO: 32.7 PG (ref 26.5–33)
MCHC RBC AUTO-ENTMCNC: 33.3 G/DL (ref 31.5–36.5)
MCV RBC AUTO: 98 FL (ref 78–100)
NONHDLC SERPL-MCNC: 118 MG/DL
PLATELET # BLD AUTO: 156 10E3/UL (ref 150–450)
POTASSIUM BLD-SCNC: 3.7 MMOL/L (ref 3.5–5.1)
PROT SERPL-MCNC: 7.1 G/DL (ref 6.4–8.9)
RBC # BLD AUTO: 4.56 10E6/UL (ref 4.4–5.9)
SODIUM SERPL-SCNC: 137 MMOL/L (ref 134–144)
TRIGL SERPL-MCNC: 209 MG/DL
TSH SERPL DL<=0.005 MIU/L-ACNC: 1.72 MU/L (ref 0.4–4)
WBC # BLD AUTO: 4 10E3/UL (ref 4–11)

## 2022-07-06 PROCEDURE — 36415 COLL VENOUS BLD VENIPUNCTURE: CPT | Mod: ZL | Performed by: INTERNAL MEDICINE

## 2022-07-06 PROCEDURE — 99214 OFFICE O/P EST MOD 30 MIN: CPT | Performed by: INTERNAL MEDICINE

## 2022-07-06 PROCEDURE — 80061 LIPID PANEL: CPT | Mod: ZL | Performed by: INTERNAL MEDICINE

## 2022-07-06 PROCEDURE — G0463 HOSPITAL OUTPT CLINIC VISIT: HCPCS

## 2022-07-06 PROCEDURE — 83036 HEMOGLOBIN GLYCOSYLATED A1C: CPT | Mod: ZL | Performed by: INTERNAL MEDICINE

## 2022-07-06 PROCEDURE — 85027 COMPLETE CBC AUTOMATED: CPT | Mod: ZL | Performed by: INTERNAL MEDICINE

## 2022-07-06 PROCEDURE — 84443 ASSAY THYROID STIM HORMONE: CPT | Mod: ZL | Performed by: INTERNAL MEDICINE

## 2022-07-06 PROCEDURE — 80053 COMPREHEN METABOLIC PANEL: CPT | Mod: ZL | Performed by: INTERNAL MEDICINE

## 2022-07-06 RX ORDER — LORAZEPAM 0.5 MG/1
0.5 TABLET ORAL 2 TIMES DAILY PRN
Qty: 60 TABLET | Refills: 2 | Status: SHIPPED | OUTPATIENT
Start: 2022-07-06 | End: 2022-10-07

## 2022-07-06 RX ORDER — DEXTROAMPHETAMINE SACCHARATE, AMPHETAMINE ASPARTATE MONOHYDRATE, DEXTROAMPHETAMINE SULFATE AND AMPHETAMINE SULFATE 7.5; 7.5; 7.5; 7.5 MG/1; MG/1; MG/1; MG/1
30 CAPSULE, EXTENDED RELEASE ORAL DAILY
Qty: 30 CAPSULE | Refills: 0 | Status: SHIPPED | OUTPATIENT
Start: 2022-08-31 | End: 2022-10-07

## 2022-07-06 RX ORDER — DEXTROAMPHETAMINE SACCHARATE, AMPHETAMINE ASPARTATE MONOHYDRATE, DEXTROAMPHETAMINE SULFATE AND AMPHETAMINE SULFATE 7.5; 7.5; 7.5; 7.5 MG/1; MG/1; MG/1; MG/1
30 CAPSULE, EXTENDED RELEASE ORAL DAILY
Qty: 30 CAPSULE | Refills: 0 | Status: SHIPPED | OUTPATIENT
Start: 2022-08-03 | End: 2022-10-07

## 2022-07-06 RX ORDER — DEXTROAMPHETAMINE SACCHARATE, AMPHETAMINE ASPARTATE MONOHYDRATE, DEXTROAMPHETAMINE SULFATE AND AMPHETAMINE SULFATE 7.5; 7.5; 7.5; 7.5 MG/1; MG/1; MG/1; MG/1
30 CAPSULE, EXTENDED RELEASE ORAL DAILY
Qty: 30 CAPSULE | Refills: 0 | Status: SHIPPED | OUTPATIENT
Start: 2022-07-06 | End: 2022-10-07

## 2022-07-06 ASSESSMENT — ENCOUNTER SYMPTOMS
WOUND: 0
BRUISES/BLEEDS EASILY: 0
FEVER: 0
ROS SKIN COMMENTS: HAIR LOSS
SHORTNESS OF BREATH: 0
NERVOUS/ANXIOUS: 1
ABDOMINAL PAIN: 0
BACK PAIN: 0
CHILLS: 0
COUGH: 0

## 2022-07-06 ASSESSMENT — PAIN SCALES - GENERAL: PAINLEVEL: NO PAIN (0)

## 2022-07-06 NOTE — LETTER
[facility] AND [facility]  07/06/22  Patient: Kike Hess  YOB: 1980  Medical Record Number: [id number]                                                                                  Non-Opioid Controlled Substance Agreement    This is an agreement between you and your provider regarding safe and appropriate use of controlled substances prescribed by your care team. Controlled substances are?medicines that can cause physical and mental dependence (abuse).     There are strict laws about having and using these medicines. We here at [facility] are  committed to working with you in your efforts to get better. To support you in this work, we'll help you schedule regular office appointments for medicine refills. If we must cancel or change your appointment for any reason, we'll make sure you have enough medicine to last until your next appointment.     As a Provider, I will:     Listen carefully to your concerns while treating you with respect.     Recommend a treatment plan that I believe is in your best interest and may involve therapies other than medicine.      Talk with you often about the possible benefits and the risk of harm of any medicine that we prescribe for you.    Assess the safety of this medicine and check how well it works.      Provide a plan on how to taper (discontinue or go off) using this medicine if the decision is made to stop its use.      ::  As a Patient, I understand controlled substances:       Are prescribed by my care provider to help me function or work and manage my condition(s).?    Are strong medicines and can cause serious side effects.       Need to be taken exactly as prescribed.?Combining controlled substances with certain medicines or chemicals (such as illegal drugs, alcohol, sedatives, sleeping pills, and benzodiazepines) can be dangerous or even fatal.? If I stop taking my medicines suddenly, I may have severe withdrawal symptoms.     The  risks, benefits, and side effects of these medicine(s) were explained to me. I agree that:    1. I will take part in other treatments as advised by my care team. This may be psychiatry or counseling, physical therapy, behavioral therapy, group treatment or a referral to specialist.    2. I will keep all my appointments and understand this is part of the monitoring of controlled substances.?My care team may require an office visit for EVERY controlled substance refill. If I miss appointments or don t follow instructions, my care team may stop my medicine    3. I will take my medicines as prescribed. I will not change the dose or schedule unless my care team tells me to. There will be no refills if I run out early.      4. I may be asked to come to the clinic and complete a urine drug test or complete a pill count. If I don t give a urine sample or participate in a pill count, the care team may stop my medicine.    5. I will only receive controlled substance prescriptions from this clinic. If I am treated by another provider, I will tell them that I am taking controlled substances and that I have a treatment agreement with this provider. I will inform my [facility] care team within one business day if I am given a prescription for any controlled substance by another healthcare provider. My [facility] care team can contact other providers and pharmacists about my use of any medicines.    6. It is up to me to make sure that I don't run out of my medicines on weekends or holidays.?If my care team is willing to refill my prescription without a visit, I must request refills only during office hours. Refills may take up to 3 business days to process. I will use one pharmacy to fill all my controlled substance prescriptions. I will notify the clinic about any changes to my insurance or medicine availability.    7. I am responsible for my prescriptions. If the medicine/prescription is lost, stolen or destroyed,  it will not be replaced.?I also agree not to share controlled substance medicines with anyone.     8. I am aware I should not use any illegal or recreational drugs. I agree not to drink alcohol unless my care team says I can.     9. If I enroll in the [place] Medical Cannabis program, I will tell my care team before my next refill.    10. I will tell my care team right away if I become pregnant, have a new medical problem treated outside of my regular clinic, or have a change in my medicines.     11. I understand that this medicine can affect my thinking, judgment and reaction time.? Alcohol and drugs affect the brain and body, which can affect the safety of my driving. Being under the influence of alcohol or drugs can affect my decision-making, behaviors, personal safety and the safety of others. Driving while impaired (DWI) can occur if a person is driving, operating or in physical control of a car, motorcycle, boat, snowmobile, ATV, motorbike, off-road vehicle or any other motor vehicle ([place] Statute 169A.20). I understand the risk if I choose to drive or operate any vehicle or machinery.    I understand that if I do not follow any of the conditions above, my prescriptions or treatment may be stopped or changed.   I agree that my provider, clinic care team and pharmacy may work with any city, state or federal law enforcement agency that investigates the misuse, sale or other diversion of my controlled medicine. I will allow my provider to discuss my care with, or share a copy of, this agreement with any other treating provider, pharmacy or emergency room where I receive care.     I have read this agreement and have asked questions about anything I did not understand.    ________________________________________________________  Patient Signature - Kike Hess     ___________________                   Date     ________________________________________________________  Provider Signature - Jarad Salcido MD        ___________________                   Date     ________________________________________________________  Witness Signature (required if provider not present while patient signing)          ___________________                   Date

## 2022-07-06 NOTE — NURSING NOTE
"Chief Complaint   Patient presents with     Pain              Initial BP (!) 140/70 (BP Location: Right arm, Patient Position: Sitting, Cuff Size: Adult Regular)   Pulse 99   Temp 98.6  F (37  C) (Tympanic)   Resp 16   Ht 1.803 m (5' 11\")   Wt 86.2 kg (190 lb)   SpO2 97%   BMI 26.50 kg/m   Estimated body mass index is 26.5 kg/m  as calculated from the following:    Height as of this encounter: 1.803 m (5' 11\").    Weight as of this encounter: 86.2 kg (190 lb).  Medication Reconciliation: complete  Etelvina Kowalski RN  FOOD SECURITY SCREENING QUESTIONS  The next two questions are to help us understand your food security.  If you are feeling you need any assistance in this area, we have resources available to support you today.    Hunger Vital Signs:  Within the past 12 months we worried whether our food would run out before we got money to buy more. Never  Within the past 12 months the food we bought just didn't last and we didn't have money to get more. Never      Etelvina Kowalski RN 7/6/2022 9:55 AM  "

## 2022-07-06 NOTE — LETTER
July 6, 2022      Mayte Hess  [address] [place] [address]        Dear ,    We are writing to inform you of your test results.    Labs are stable.   Continue current medications.     Electronically signed by:  Jarad Salcido MD  on July 6, 2022     Resulted Orders   Comprehensive metabolic panel   Result Value Ref Range    Sodium 137 134 - 144 mmol/L    Potassium 3.7 3.5 - 5.1 mmol/L    Chloride 104 98 - 107 mmol/L    Carbon Dioxide (CO2) 25 21 - 31 mmol/L    Anion Gap 8 3 - 14 mmol/L    Urea Nitrogen 19 7 - 25 mg/dL    Creatinine 0.84 0.70 - 1.30 mg/dL    Calcium 9.3 8.6 - 10.3 mg/dL    Glucose 113 (H) 70 - 105 mg/dL    Alkaline Phosphatase 51 34 - 104 U/L    AST 64 (H) 13 - 39 U/L    ALT 54 (H) 7 - 52 U/L    Protein Total 7.1 6.4 - 8.9 g/dL    Albumin 4.4 3.5 - 5.7 g/dL    Bilirubin Total 0.7 0.3 - 1.0 mg/dL    GFR Estimate >90 >60 mL/min/1.73m2      Comment:      Effective December 21, 2021 eGFRcr in adults is calculated using the 2021 CKD-EPI creatinine equation which includes age and gender (Daniel che al., NEJM, DOI: 10.1056/YUIPin9645548)   Lipid Profile   Result Value Ref Range    Cholesterol 182 <200 mg/dL    Triglycerides 209 (H) <150 mg/dL    Direct Measure HDL 64 23 - 92 mg/dL    LDL Cholesterol Calculated 76 <=100 mg/dL    Non HDL Cholesterol 118 <130 mg/dL    Patient Fasting > 8hrs? No     Narrative    Cholesterol  Desirable:  <200 mg/dL    Triglycerides  Normal:  Less than 150 mg/dL  Borderline High:  150-199 mg/dL  High:  200-499 mg/dL  Very High:  Greater than or equal to 500 mg/dL    Direct Measure HDL  Female:  Greater than or equal to 50 mg/dL   Male:  Greater than or equal to 40 mg/dL    LDL Cholesterol  Desirable:  <100mg/dL  Above Desirable:  100-129 mg/dL   Borderline High:  130-159 mg/dL   High:  160-189 mg/dL   Very High:  >= 190 mg/dL    Non HDL Cholesterol  Desirable:  130 mg/dL  Above Desirable:  130-159 mg/dL  Borderline High:  160-189 mg/dL  High:  190-219  mg/dL  Very High:  Greater than or equal to 220 mg/dL   CBC with platelets   Result Value Ref Range    WBC Count 4.0 4.0 - 11.0 10e3/uL    RBC Count 4.56 4.40 - 5.90 10e6/uL    Hemoglobin 14.9 13.3 - 17.7 g/dL    Hematocrit 44.7 40.0 - 53.0 %    MCV 98 78 - 100 fL    MCH 32.7 26.5 - 33.0 pg    MCHC 33.3 31.5 - 36.5 g/dL    RDW 12.5 10.0 - 15.0 %    Platelet Count 156 150 - 450 10e3/uL   Hemoglobin A1c   Result Value Ref Range    Hemoglobin A1C 5.9 4.0 - 6.2 %   TSH with free T4 reflex   Result Value Ref Range    TSH 1.72 0.40 - 4.00 mU/L       If you have any questions or concerns, please call the clinic at the number listed above.       Sincerely,      Jarad Salcido MD

## 2022-07-06 NOTE — PROGRESS NOTES
Assessment & Plan       ICD-10-CM    1. Attention deficit hyperactivity disorder (ADHD), combined type  F90.2 amphetamine-dextroamphetamine (ADDERALL XR) 30 MG 24 hr capsule     amphetamine-dextroamphetamine (ADDERALL XR) 30 MG 24 hr capsule     amphetamine-dextroamphetamine (ADDERALL XR) 30 MG 24 hr capsule     LORazepam (ATIVAN) 0.5 MG tablet   2. Elevated random blood glucose level  R73.09 Hemoglobin A1c   3. Familial hypercholesterolemia - at least heterozygote  E78.01 Lipid Profile   4. Hypertriglyceridemia  E78.1 Lipid Profile     TSH with free T4 reflex   5. Inflammatory bowel disease -- not definitive of UC vs chrons based on blood work in 2006  K52.9    6. Other long term (current) drug therapy - 7/6/2022 - Adderall and Ativan  Z79.899 amphetamine-dextroamphetamine (ADDERALL XR) 30 MG 24 hr capsule     amphetamine-dextroamphetamine (ADDERALL XR) 30 MG 24 hr capsule     amphetamine-dextroamphetamine (ADDERALL XR) 30 MG 24 hr capsule     LORazepam (ATIVAN) 0.5 MG tablet   7. Anxiety  F41.9 LORazepam (ATIVAN) 0.5 MG tablet   8. Elevated LFTs  R79.89 Comprehensive metabolic panel   9. Benign essential hypertension  I10 Comprehensive metabolic panel     CBC with platelets     TSH with free T4 reflex   Patient presents for follow-up multiple issues.    ADHD, chronic anxiety.  Uses combination of lorazepam and Adderall to help with his chronic symptoms.   website reviewed.  No abnormal findings noted.  No opiate use.  Controlled substance agreement updated today.  Urine drug screen is up-to-date.  Proper medication use and misuse reviewed.  Patient has been using medications appropriately.  Prescriptions refilled x3 months.    Elevated on glucose, hemoglobin A1c showing prediabetes with previous lab work.  Labs are relatively stable and encouraged reduce oral carbohydrate intake.  Regular exercise.    Familial hyperlipidemia with high triglycerides.  Cholesterol levels have improved with combination of fibrate  and statins.  Seems to be doing well with current dosing.  Continue Tricor at current dosing.  If possible we will try to slowly work on dose increase of medication to help with his triglyceride levels as they are still elevated and not at goal range.    Inflammatory bowel disease, chronic, continues with mesalamine.  Seems stable at this time.  No changes for now.    Elevated liver function tests.  Metabolic panel checked and his liver enzymes did go up again slightly but are still down compared to previously when his triglyceride levels were so high.    Hypertension.  Blood pressure is currently well controlled.  Denies syncope or presyncope.  Lab work today.    Encouraged regular exercise.     Return in about 12 weeks (around 9/28/2022) for - Med Refills - Chronic Med Management, + Update UTOX.    Jarad Salcido MD  [facility]     Subjective   Mayte is a 42 year old, presenting for the following health issues:  Pain (/)      Pain  Pertinent negatives include no abdominal pain, chest pain, chills, congestion, coughing, fever or rash.   History of Present Illness       Reason for visit:  Medication appointment    He eats 4 or more servings of fruits and vegetables daily.He consumes 1 sweetened beverage(s) daily.He exercises with enough effort to increase his heart rate 60 or more minutes per day.  He exercises with enough effort to increase his heart rate 6 days per week.   He is taking medications regularly.     Review of Systems   Constitutional: Negative for chills and fever.   HENT: Negative for congestion.    Eyes:        Intermittent corneal / eye irritation / inflammation   Respiratory: Negative for cough and shortness of breath.    Cardiovascular: Negative for chest pain.   Gastrointestinal: Negative for abdominal pain.   Genitourinary: Positive for impotence (intermittent).   Musculoskeletal: Negative for back pain.   Skin: Negative for rash and wound.        Hair loss  "  Allergic/Immunologic: Positive for environmental allergies.   Neurological: Negative for syncope.   Hematological: Does not bruise/bleed easily.   Psychiatric/Behavioral: The patient is nervous/anxious (improved with intermittent ativan).         ADHD - much better with adderall. Able to work full time.   All other systems reviewed and are negative.         Objective    /80 (BP Location: Right arm, Patient Position: Sitting, Cuff Size: Adult Large)   Pulse 99   Temp 98.6  F (37  C) (Tympanic)   Resp 16   Ht 1.803 m (5' 11\")   Wt 86.2 kg (190 lb)   SpO2 97%   BMI 26.50 kg/m    Body mass index is 26.5 kg/m .  Physical Exam  Constitutional:       General: He is not in acute distress.     Appearance: He is well-developed. He is not diaphoretic.   HENT:      Head: Normocephalic and atraumatic.   Eyes:      General: No scleral icterus.     Conjunctiva/sclera: Conjunctivae normal.   Cardiovascular:      Rate and Rhythm: Normal rate and regular rhythm.      Pulses: Normal pulses.   Pulmonary:      Effort: Pulmonary effort is normal.      Breath sounds: Normal breath sounds.   Abdominal:      Palpations: Abdomen is soft.      Tenderness: There is no abdominal tenderness.   Musculoskeletal:         General: No deformity.      Cervical back: Neck supple.      Right lower leg: No edema.      Left lower leg: No edema.   Lymphadenopathy:      Cervical: No cervical adenopathy.   Skin:     General: Skin is warm and dry.      Findings: No rash.   Neurological:      Mental Status: He is alert and oriented to person, place, and time. Mental status is at baseline.   Psychiatric:         Mood and Affect: Mood normal.         Behavior: Behavior normal.          Recent Labs   Lab Test 07/06/22  1022 10/15/21  0749 11/18/20  0816 07/14/20  1206   A1C 5.9 6.0 5.7  --    LDL 76 77 Cannot estimate LDL when triglyceride exceeds 400 mg/dL  --    HDL 64 54 29*  --    TRIG 209* 203* 1,453*  --    ALT 54* 44 160* 118*   CR 0.84 0.99 " 1.02 0.98   GFRESTIMATED >90 >90 >90 84   GFRESTBLACK  --   --  >90 >90   POTASSIUM 3.7 4.3 5.3 3.8   TSH 1.72 2.53  --   --    Hemoglobin A1c shows prediabetes.  LDL is at goal.  HDL is at goal.  Triglycerides are high and not at goal but have improved significantly.  ALT is slightly elevated.  Creatinine has improved.  Potassium is borderline low.  TSH is normal.              .  ..

## 2022-07-06 NOTE — PATIENT INSTRUCTIONS
Blood pressure is well controlled.   Pre-diabetes has been well controlled.     Medications refilled.   Labs are pending.     CSA updated 7/6/2022     Return in approximately 3 month(s), or sooner as needed for follow-up with Dr. Salcido.  -- controlled med refills.     Clinic : [phone]  Appointment line: [phone]

## 2022-08-19 ENCOUNTER — OFFICE VISIT (OUTPATIENT)
Dept: FAMILY MEDICINE | Facility: OTHER | Age: 42
End: 2022-08-19
Attending: FAMILY MEDICINE
Payer: COMMERCIAL

## 2022-08-19 VITALS
OXYGEN SATURATION: 98 % | SYSTOLIC BLOOD PRESSURE: 132 MMHG | RESPIRATION RATE: 18 BRPM | DIASTOLIC BLOOD PRESSURE: 88 MMHG | HEART RATE: 114 BPM | BODY MASS INDEX: 25.69 KG/M2 | TEMPERATURE: 98.7 F | WEIGHT: 184.2 LBS

## 2022-08-19 DIAGNOSIS — S99.921A INJURY OF TOE ON RIGHT FOOT, INITIAL ENCOUNTER: ICD-10-CM

## 2022-08-19 DIAGNOSIS — L03.90 CELLULITIS, UNSPECIFIED CELLULITIS SITE: Primary | ICD-10-CM

## 2022-08-19 PROCEDURE — G0463 HOSPITAL OUTPT CLINIC VISIT: HCPCS

## 2022-08-19 PROCEDURE — 99213 OFFICE O/P EST LOW 20 MIN: CPT | Performed by: FAMILY MEDICINE

## 2022-08-19 RX ORDER — CEPHALEXIN 500 MG/1
500 CAPSULE ORAL 4 TIMES DAILY
Qty: 40 CAPSULE | Refills: 0 | Status: SHIPPED | OUTPATIENT
Start: 2022-08-19 | End: 2022-08-29

## 2022-08-19 ASSESSMENT — PAIN SCALES - GENERAL: PAINLEVEL: MODERATE PAIN (5)

## 2022-08-19 NOTE — NURSING NOTE
"Chief Complaint   Patient presents with     Musculoskeletal Problem     Right foot - toe injury. Injured on Monday. Stubbed toe on a rock while [place].        Initial /88 (BP Location: Right arm, Patient Position: Sitting, Cuff Size: Adult Regular)   Pulse 114   Temp 98.7  F (37.1  C) (Tympanic)   Resp 18   Wt 83.6 kg (184 lb 3.2 oz)   SpO2 98%   BMI 25.69 kg/m   Estimated body mass index is 25.69 kg/m  as calculated from the following:    Height as of 7/6/22: 1.803 m (5' 11\").    Weight as of this encounter: 83.6 kg (184 lb 3.2 oz).  Medication Reconciliation: complete    Heather Beth LPN    Advance Care Directive reviewed    "

## 2022-08-19 NOTE — PROGRESS NOTES
"  Assessment & Plan       ICD-10-CM    1. Cellulitis, unspecified cellulitis site  L03.90 cephALEXin (KEFLEX) 500 MG capsule   2. Injury of toe on right foot, initial encounter  S99.921A      Overall, toe looks okay at this time.   Patient concerned about progressing to infection.   Prescription for keflex provided to use if increased swelling/redness/drainage.   Anticipate flap will progress to necrosis and fall off.   But would recommend leaving in place for now.   No indication for XR.   Follow up with concerns.        BMI:   Estimated body mass index is 25.69 kg/m  as calculated from the following:    Height as of 7/6/22: 1.803 m (5' 11\").    Weight as of this encounter: 83.6 kg (184 lb 3.2 oz).       No follow-ups on file.    Valencia Basurto MD  [facility]    Subjective   Mayte is a 42 year old, presenting for the following health issues:  Musculoskeletal Problem (Right foot - toe injury. Injured on Monday. Stubbed toe on a rock while [place]. )      Musculoskeletal Problem    History of Present Illness       Reason for visit:  Toe injury  Symptom onset:  1-3 days ago  Symptoms include:  Toe pain  Symptom intensity:  Moderate  Symptom progression:  Staying the same  Had these symptoms before:  No    He eats 4 or more servings of fruits and vegetables daily.He consumes 1 sweetened beverage(s) daily.He exercises with enough effort to increase his heart rate 60 or more minutes per day.  He exercises with enough effort to increase his heart rate 7 days per week.   He is taking medications regularly.       Pain History:  When did you first notice your pain? - Acute Pain   Have you seen anyone else for your pain? No  Where in your body do you have pain? right toe      Review of Systems   Constitutional, HEENT, cardiovascular, pulmonary, gi and gu systems are negative, except as otherwise noted.      Objective    /88 (BP Location: Right arm, Patient Position: Sitting, Cuff Size: Adult Regular)   " Pulse 114   Temp 98.7  F (37.1  C) (Tympanic)   Resp 18   Wt 83.6 kg (184 lb 3.2 oz)   SpO2 98%   BMI 25.69 kg/m    Body mass index is 25.69 kg/m .  Physical Exam  Constitutional:       Appearance: He is well-developed.   HENT:      Right Ear: External ear normal.      Left Ear: External ear normal.   Eyes:      General: No scleral icterus.     Conjunctiva/sclera: Conjunctivae normal.   Cardiovascular:      Rate and Rhythm: Normal rate.   Pulmonary:      Effort: Pulmonary effort is normal. No respiratory distress.   Musculoskeletal:      Comments: R big toe without swelling. No pain with palpation along the metatarsal. disruption of skin at the tip, 1 cm square with slight discoloration of flap but no surrounding erythema/drainage/bleeding.    Skin:     Findings: No rash.   Neurological:      Mental Status: He is alert.

## 2022-10-06 ENCOUNTER — TELEPHONE (OUTPATIENT)
Dept: INTERNAL MEDICINE | Facility: OTHER | Age: 42
End: 2022-10-06

## 2022-10-06 NOTE — TELEPHONE ENCOUNTER
Clinic appointment at 2:20 PM on Friday 10/7 would work.  Please call patient and schedule / let him know.    Did patient miss or no-show his last appointment?    Electronically signed by:  Jarad Salcido MD  on October 6, 2022 6:28 PM

## 2022-10-06 NOTE — TELEPHONE ENCOUNTER
Please call the patient.  He needs a work in for medication refill by Tuesday.   Or will you refill his medications?  He has an appointment on 11/23/2022  With his PCP.  That was the soonest.   He will be out of medications by Tuesday.      Crista Sauceda on 10/6/2022 at 2:19 PM

## 2022-10-07 ENCOUNTER — OFFICE VISIT (OUTPATIENT)
Dept: INTERNAL MEDICINE | Facility: OTHER | Age: 42
End: 2022-10-07
Attending: INTERNAL MEDICINE
Payer: COMMERCIAL

## 2022-10-07 VITALS
HEIGHT: 71 IN | HEART RATE: 113 BPM | DIASTOLIC BLOOD PRESSURE: 74 MMHG | OXYGEN SATURATION: 97 % | SYSTOLIC BLOOD PRESSURE: 136 MMHG | WEIGHT: 190.8 LBS | RESPIRATION RATE: 20 BRPM | BODY MASS INDEX: 26.71 KG/M2 | TEMPERATURE: 97 F

## 2022-10-07 DIAGNOSIS — N52.9 ERECTILE DYSFUNCTION, UNSPECIFIED ERECTILE DYSFUNCTION TYPE: ICD-10-CM

## 2022-10-07 DIAGNOSIS — I10 BENIGN ESSENTIAL HYPERTENSION: Primary | ICD-10-CM

## 2022-10-07 DIAGNOSIS — F90.2 ATTENTION DEFICIT HYPERACTIVITY DISORDER (ADHD), COMBINED TYPE: ICD-10-CM

## 2022-10-07 DIAGNOSIS — Z79.899 OTHER LONG TERM (CURRENT) DRUG THERAPY: ICD-10-CM

## 2022-10-07 DIAGNOSIS — E78.01 FAMILIAL HYPERCHOLESTEROLEMIA: ICD-10-CM

## 2022-10-07 DIAGNOSIS — G47.9 SLEEPING DIFFICULTIES: ICD-10-CM

## 2022-10-07 DIAGNOSIS — F41.9 ANXIETY: ICD-10-CM

## 2022-10-07 DIAGNOSIS — G43.009 MIGRAINE WITHOUT AURA AND WITHOUT STATUS MIGRAINOSUS, NOT INTRACTABLE: ICD-10-CM

## 2022-10-07 PROCEDURE — 99214 OFFICE O/P EST MOD 30 MIN: CPT | Performed by: INTERNAL MEDICINE

## 2022-10-07 PROCEDURE — G0463 HOSPITAL OUTPT CLINIC VISIT: HCPCS

## 2022-10-07 PROCEDURE — 0124A COVID-19,PF,PFIZER BOOSTER BIVALENT: CPT

## 2022-10-07 PROCEDURE — G0463 HOSPITAL OUTPT CLINIC VISIT: HCPCS | Mod: 25

## 2022-10-07 PROCEDURE — G0008 ADMIN INFLUENZA VIRUS VAC: HCPCS

## 2022-10-07 PROCEDURE — 91312 COVID-19,PF,PFIZER BOOSTER BIVALENT: CPT

## 2022-10-07 PROCEDURE — 90686 IIV4 VACC NO PRSV 0.5 ML IM: CPT

## 2022-10-07 RX ORDER — SILDENAFIL 100 MG/1
50-100 TABLET, FILM COATED ORAL DAILY PRN
Qty: 10 TABLET | Refills: 1 | Status: SHIPPED | OUTPATIENT
Start: 2022-10-07 | End: 2022-12-30

## 2022-10-07 RX ORDER — DEXTROAMPHETAMINE SACCHARATE, AMPHETAMINE ASPARTATE MONOHYDRATE, DEXTROAMPHETAMINE SULFATE AND AMPHETAMINE SULFATE 7.5; 7.5; 7.5; 7.5 MG/1; MG/1; MG/1; MG/1
30 CAPSULE, EXTENDED RELEASE ORAL DAILY
Qty: 30 CAPSULE | Refills: 0 | Status: SHIPPED | OUTPATIENT
Start: 2022-12-02 | End: 2022-12-30

## 2022-10-07 RX ORDER — LORAZEPAM 0.5 MG/1
0.5 TABLET ORAL 2 TIMES DAILY PRN
Qty: 60 TABLET | Refills: 2 | Status: SHIPPED | OUTPATIENT
Start: 2022-10-07 | End: 2022-12-30

## 2022-10-07 RX ORDER — DEXTROAMPHETAMINE SACCHARATE, AMPHETAMINE ASPARTATE MONOHYDRATE, DEXTROAMPHETAMINE SULFATE AND AMPHETAMINE SULFATE 7.5; 7.5; 7.5; 7.5 MG/1; MG/1; MG/1; MG/1
30 CAPSULE, EXTENDED RELEASE ORAL DAILY
Qty: 30 CAPSULE | Refills: 0 | Status: SHIPPED | OUTPATIENT
Start: 2022-10-07 | End: 2022-12-30

## 2022-10-07 RX ORDER — DEXTROAMPHETAMINE SACCHARATE, AMPHETAMINE ASPARTATE MONOHYDRATE, DEXTROAMPHETAMINE SULFATE AND AMPHETAMINE SULFATE 7.5; 7.5; 7.5; 7.5 MG/1; MG/1; MG/1; MG/1
30 CAPSULE, EXTENDED RELEASE ORAL DAILY
Qty: 30 CAPSULE | Refills: 0 | Status: SHIPPED | OUTPATIENT
Start: 2022-11-04 | End: 2022-12-30

## 2022-10-07 ASSESSMENT — ENCOUNTER SYMPTOMS
NERVOUS/ANXIOUS: 1
ROS SKIN COMMENTS: HAIR LOSS
COUGH: 0
BACK PAIN: 0
BRUISES/BLEEDS EASILY: 0
ABDOMINAL PAIN: 0
WOUND: 0
FEVER: 0
SHORTNESS OF BREATH: 0
CHILLS: 0

## 2022-10-07 ASSESSMENT — ANXIETY QUESTIONNAIRES
GAD7 TOTAL SCORE: 2
3. WORRYING TOO MUCH ABOUT DIFFERENT THINGS: SEVERAL DAYS
GAD7 TOTAL SCORE: 2
1. FEELING NERVOUS, ANXIOUS, OR ON EDGE: NOT AT ALL
7. FEELING AFRAID AS IF SOMETHING AWFUL MIGHT HAPPEN: NOT AT ALL
2. NOT BEING ABLE TO STOP OR CONTROL WORRYING: NOT AT ALL
8. IF YOU CHECKED OFF ANY PROBLEMS, HOW DIFFICULT HAVE THESE MADE IT FOR YOU TO DO YOUR WORK, TAKE CARE OF THINGS AT HOME, OR GET ALONG WITH OTHER PEOPLE?: NOT DIFFICULT AT ALL
4. TROUBLE RELAXING: NOT AT ALL
7. FEELING AFRAID AS IF SOMETHING AWFUL MIGHT HAPPEN: NOT AT ALL
6. BECOMING EASILY ANNOYED OR IRRITABLE: SEVERAL DAYS
5. BEING SO RESTLESS THAT IT IS HARD TO SIT STILL: NOT AT ALL
GAD7 TOTAL SCORE: 2
IF YOU CHECKED OFF ANY PROBLEMS ON THIS QUESTIONNAIRE, HOW DIFFICULT HAVE THESE PROBLEMS MADE IT FOR YOU TO DO YOUR WORK, TAKE CARE OF THINGS AT HOME, OR GET ALONG WITH OTHER PEOPLE: NOT DIFFICULT AT ALL

## 2022-10-07 ASSESSMENT — PAIN SCALES - GENERAL: PAINLEVEL: NO PAIN (0)

## 2022-10-07 NOTE — TELEPHONE ENCOUNTER
Called and gave the below information to the patient. He will come in at 220.   Evelin Scherer LPN on 10/7/2022 at 9:33 AM

## 2022-10-07 NOTE — NURSING NOTE
"Chief Complaint   Patient presents with     Pain Management         Initial /74 (BP Location: Right arm, Patient Position: Right side, Cuff Size: Adult Regular)   Pulse 113   Temp 97  F (36.1  C) (Temporal)   Resp 20   Ht 1.791 m (5' 10.5\")   Wt 86.5 kg (190 lb 12.8 oz)   SpO2 97%   BMI 26.99 kg/m   Estimated body mass index is 26.99 kg/m  as calculated from the following:    Height as of this encounter: 1.791 m (5' 10.5\").    Weight as of this encounter: 86.5 kg (190 lb 12.8 oz).       FOOD SECURITY SCREENING QUESTIONS:    The next two questions are to help us understand your food security.  If you are feeling you need any assistance in this area, we have resources available to support you today.    Hunger Vital Signs:  Within the past 12 months we worried whether our food would run out before we got money to buy more. Never  Within the past 12 months the food we bought just didn't last and we didn't have money to get more. Never    Advance Care Directive on file? no      Medication reconciliation complete.      Yogi Lucas,on 10/7/2022 at 2:36 PM        "

## 2022-10-07 NOTE — PROGRESS NOTES
Assessment & Plan   Kike Hess is a 42 year old, presenting for the following health issues:      ICD-10-CM    1. Benign essential hypertension  I10       2. Familial hypercholesterolemia - at least heterozygote  E78.01       3. Sleeping difficulties  G47.9       4. Other long term (current) drug therapy - 7/6/2022 - Adderall and Ativan  Z79.899 amphetamine-dextroamphetamine (ADDERALL XR) 30 MG 24 hr capsule     amphetamine-dextroamphetamine (ADDERALL XR) 30 MG 24 hr capsule     amphetamine-dextroamphetamine (ADDERALL XR) 30 MG 24 hr capsule     LORazepam (ATIVAN) 0.5 MG tablet      5. Attention deficit hyperactivity disorder (ADHD), combined type  F90.2 amphetamine-dextroamphetamine (ADDERALL XR) 30 MG 24 hr capsule     amphetamine-dextroamphetamine (ADDERALL XR) 30 MG 24 hr capsule     amphetamine-dextroamphetamine (ADDERALL XR) 30 MG 24 hr capsule     LORazepam (ATIVAN) 0.5 MG tablet      6. Anxiety  F41.9 LORazepam (ATIVAN) 0.5 MG tablet      7. Migraine without aura and without status migrainosus, not intractable  G43.009       8. Erectile dysfunction, unspecified erectile dysfunction type  N52.9 sildenafil (VIAGRA) 100 MG tablet      Patient presents for medication management follow-up appointment as well as follow-up multiple issues.    Hypertension, blood pressure is currently well controlled.  Doing well with current medications.  No changes for now.    Familial hyperlipidemia, cholesterol levels were previously extremely high.  Have all improved with current medications.  No changes for now.    Sleep difficulties, ongoing.  Still taking lorazepam daily.  Has chronic anxiety.   website reviewed.  No opiate use.  Proper medication use and misuse reviewed.  Patient has been using medication properly.  Continues with Adderall daily.  Needs refills for his chronic ADHD.  Controlled substance agreement is up-to-date.  Urine drug screen is up-to-date.  Prescriptions refilled x3 months.    Migraine  headaches, has been doing better recently.    Erectile dysfunction, ongoing.  Still using Viagra without side effects.  Needs refills.    Encouraged regular exercise.     Return in about 12 weeks (around 12/30/2022) for - Med Refills - Chronic Med Refills.    Jarad Salcido MD  [facility] [place]     Subjective   Medication refill    History of Present Illness       Mental Health Follow-up:  Patient presents to follow-up on Depression & Anxiety.Patient's depression since last visit has been:  Good  The patient is not having other symptoms associated with depression.  Patient's anxiety since last visit has been:  Good  The patient is not having other symptoms associated with anxiety.  Any significant life events: No  Patient is not feeling anxious or having panic attacks.  Patient has no concerns about alcohol or drug use.    Hypertension: He presents for follow up of hypertension.  He does check blood pressure  regularly outside of the clinic. Outside blood pressures have been over 140/90. He does not follow a low salt diet.      Today's PHQ-9         PHQ-9 Total Score: 2    PHQ-9 Q9 Thoughts of better off dead/self-harm past 2 weeks :   Not at all    How difficult have these problems made it for you to do your work, take care of things at home, or get along with other people: Not difficult at all  Today's ANNMARIE-7 Score: 2     Review of Systems   Constitutional: Negative for chills and fever.   HENT: Negative for congestion.    Eyes:        Intermittent corneal / eye irritation / inflammation   Respiratory: Negative for cough and shortness of breath.    Cardiovascular: Negative for chest pain.   Gastrointestinal: Negative for abdominal pain.   Genitourinary: Positive for impotence (intermittent).   Musculoskeletal: Negative for back pain.   Skin: Negative for rash and wound.        Hair loss   Allergic/Immunologic: Positive for environmental allergies.   Neurological: Negative for syncope.  "  Hematological: Does not bruise/bleed easily.   Psychiatric/Behavioral: The patient is nervous/anxious (improved with intermittent ativan).         ADHD - much better with adderall. Able to work full time.   All other systems reviewed and are negative.         Objective    /74 (BP Location: Right arm, Patient Position: Right side, Cuff Size: Adult Regular)   Pulse 113   Temp 97  F (36.1  C) (Temporal)   Resp 20   Ht 1.791 m (5' 10.5\")   Wt 86.5 kg (190 lb 12.8 oz)   SpO2 97%   BMI 26.99 kg/m    Body mass index is 26.99 kg/m .  Physical Exam  Constitutional:       General: He is not in acute distress.     Appearance: He is well-developed. He is not diaphoretic.   HENT:      Head: Normocephalic and atraumatic.   Eyes:      General: No scleral icterus.     Conjunctiva/sclera: Conjunctivae normal.   Cardiovascular:      Rate and Rhythm: Normal rate and regular rhythm.      Pulses: Normal pulses.   Pulmonary:      Effort: Pulmonary effort is normal.      Breath sounds: Normal breath sounds.   Abdominal:      Palpations: Abdomen is soft.      Tenderness: There is no abdominal tenderness.   Musculoskeletal:         General: No deformity.      Cervical back: Neck supple.      Right lower leg: No edema.      Left lower leg: No edema.   Lymphadenopathy:      Cervical: No cervical adenopathy.   Skin:     General: Skin is warm and dry.      Findings: No rash.   Neurological:      Mental Status: He is alert and oriented to person, place, and time. Mental status is at baseline.   Psychiatric:         Mood and Affect: Mood normal.         Behavior: Behavior normal.        Recent Labs   Lab Test 07/06/22  1022 10/15/21  0749 11/18/20  0816   A1C 5.9 6.0 5.7   LDL 76 77 Cannot estimate LDL when triglyceride exceeds 400 mg/dL   HDL 64 54 29*   TRIG 209* 203* 1,453*   ALT 54* 44 160*   CR 0.84 0.99 1.02   GFRESTIMATED >90 >90 >90   POTASSIUM 3.7 4.3 5.3   TSH 1.72 2.53  --    WBC 4.0 6.2 7.2   HGB 14.9 14.8 17.3   PLT " 156 145* 192   ALBUMIN 4.4 4.6 4.0

## 2022-10-07 NOTE — PATIENT INSTRUCTIONS
Blood pressure is well controlled.   Medications refilled.     Last labs were stable.     Return in approximately 12 week(s), or sooner as needed for follow-up with Dr. Salcido.  -- controlled med refills    Clinic : [phone]  Appointment line: [phone]

## 2022-12-30 ENCOUNTER — OFFICE VISIT (OUTPATIENT)
Dept: INTERNAL MEDICINE | Facility: OTHER | Age: 42
End: 2022-12-30
Attending: INTERNAL MEDICINE
Payer: COMMERCIAL

## 2022-12-30 VITALS
DIASTOLIC BLOOD PRESSURE: 88 MMHG | SYSTOLIC BLOOD PRESSURE: 152 MMHG | TEMPERATURE: 97.9 F | RESPIRATION RATE: 20 BRPM | BODY MASS INDEX: 25.43 KG/M2 | HEIGHT: 70 IN | WEIGHT: 177.6 LBS | HEART RATE: 108 BPM | OXYGEN SATURATION: 96 %

## 2022-12-30 DIAGNOSIS — N52.9 ERECTILE DYSFUNCTION, UNSPECIFIED ERECTILE DYSFUNCTION TYPE: ICD-10-CM

## 2022-12-30 DIAGNOSIS — I10 BENIGN ESSENTIAL HYPERTENSION: ICD-10-CM

## 2022-12-30 DIAGNOSIS — F90.2 ATTENTION DEFICIT HYPERACTIVITY DISORDER (ADHD), COMBINED TYPE: ICD-10-CM

## 2022-12-30 DIAGNOSIS — L65.9 HAIR LOSS: ICD-10-CM

## 2022-12-30 DIAGNOSIS — J30.81 ALLERGIC RHINITIS DUE TO CAT HAIR: ICD-10-CM

## 2022-12-30 DIAGNOSIS — E78.01 FAMILIAL HYPERCHOLESTEROLEMIA: ICD-10-CM

## 2022-12-30 DIAGNOSIS — Z79.899 OTHER LONG TERM (CURRENT) DRUG THERAPY: ICD-10-CM

## 2022-12-30 DIAGNOSIS — T75.3XXD MOTION SICKNESS, SUBSEQUENT ENCOUNTER: ICD-10-CM

## 2022-12-30 DIAGNOSIS — R12 HEARTBURN: ICD-10-CM

## 2022-12-30 DIAGNOSIS — K52.9 INFLAMMATORY BOWEL DISEASE: ICD-10-CM

## 2022-12-30 DIAGNOSIS — E78.1 HYPERTRIGLYCERIDEMIA: ICD-10-CM

## 2022-12-30 DIAGNOSIS — F41.9 ANXIETY: ICD-10-CM

## 2022-12-30 DIAGNOSIS — F33.42 RECURRENT MAJOR DEPRESSIVE DISORDER, IN FULL REMISSION (H): ICD-10-CM

## 2022-12-30 PROCEDURE — G0463 HOSPITAL OUTPT CLINIC VISIT: HCPCS

## 2022-12-30 PROCEDURE — 99214 OFFICE O/P EST MOD 30 MIN: CPT | Performed by: INTERNAL MEDICINE

## 2022-12-30 RX ORDER — AMLODIPINE BESYLATE 5 MG/1
5 TABLET ORAL DAILY
Qty: 90 TABLET | Refills: 4 | Status: SHIPPED | OUTPATIENT
Start: 2022-12-30 | End: 2023-11-16

## 2022-12-30 RX ORDER — FINASTERIDE 1 MG/1
1 TABLET, FILM COATED ORAL DAILY
Qty: 90 TABLET | Refills: 4 | Status: SHIPPED | OUTPATIENT
Start: 2022-12-30 | End: 2023-11-16

## 2022-12-30 RX ORDER — LOSARTAN POTASSIUM 100 MG/1
100 TABLET ORAL DAILY
Qty: 90 TABLET | Refills: 4 | Status: SHIPPED | OUTPATIENT
Start: 2022-12-30 | End: 2023-11-16

## 2022-12-30 RX ORDER — ROSUVASTATIN CALCIUM 40 MG/1
40 TABLET, COATED ORAL DAILY
Qty: 90 TABLET | Refills: 4 | Status: SHIPPED | OUTPATIENT
Start: 2022-12-30 | End: 2023-11-16

## 2022-12-30 RX ORDER — ESCITALOPRAM OXALATE 10 MG/1
10 TABLET ORAL DAILY
Qty: 90 TABLET | Refills: 4 | Status: SHIPPED | OUTPATIENT
Start: 2022-12-30 | End: 2023-11-16

## 2022-12-30 RX ORDER — FENOFIBRATE 145 MG/1
145 TABLET, COATED ORAL DAILY
Qty: 90 TABLET | Refills: 4 | Status: SHIPPED | OUTPATIENT
Start: 2022-12-30 | End: 2023-11-16

## 2022-12-30 RX ORDER — SCOLOPAMINE TRANSDERMAL SYSTEM 1 MG/1
1 PATCH, EXTENDED RELEASE TRANSDERMAL
Qty: 48 PATCH | Refills: 11 | Status: SHIPPED | OUTPATIENT
Start: 2022-12-30 | End: 2023-03-24

## 2022-12-30 RX ORDER — DEXTROAMPHETAMINE SACCHARATE, AMPHETAMINE ASPARTATE MONOHYDRATE, DEXTROAMPHETAMINE SULFATE AND AMPHETAMINE SULFATE 7.5; 7.5; 7.5; 7.5 MG/1; MG/1; MG/1; MG/1
30 CAPSULE, EXTENDED RELEASE ORAL DAILY
Qty: 30 CAPSULE | Refills: 0 | Status: SHIPPED | OUTPATIENT
Start: 2022-12-30 | End: 2023-03-24

## 2022-12-30 RX ORDER — DEXTROAMPHETAMINE SACCHARATE, AMPHETAMINE ASPARTATE MONOHYDRATE, DEXTROAMPHETAMINE SULFATE AND AMPHETAMINE SULFATE 7.5; 7.5; 7.5; 7.5 MG/1; MG/1; MG/1; MG/1
30 CAPSULE, EXTENDED RELEASE ORAL DAILY
Qty: 30 CAPSULE | Refills: 0 | Status: SHIPPED | OUTPATIENT
Start: 2023-01-27 | End: 2023-03-24

## 2022-12-30 RX ORDER — MESALAMINE 400 MG/1
800 CAPSULE, DELAYED RELEASE ORAL 4 TIMES DAILY PRN
Qty: 720 CAPSULE | Refills: 4 | Status: SHIPPED | OUTPATIENT
Start: 2022-12-30 | End: 2023-11-16

## 2022-12-30 RX ORDER — OMEGA-3-ACID ETHYL ESTERS 1 G/1
1 CAPSULE, LIQUID FILLED ORAL 4 TIMES DAILY
Qty: 360 CAPSULE | Refills: 4 | Status: SHIPPED | OUTPATIENT
Start: 2022-12-30 | End: 2023-11-16

## 2022-12-30 RX ORDER — LORAZEPAM 0.5 MG/1
0.5 TABLET ORAL 2 TIMES DAILY PRN
Qty: 60 TABLET | Refills: 2 | Status: SHIPPED | OUTPATIENT
Start: 2022-12-30 | End: 2023-03-24

## 2022-12-30 RX ORDER — DEXTROAMPHETAMINE SACCHARATE, AMPHETAMINE ASPARTATE MONOHYDRATE, DEXTROAMPHETAMINE SULFATE AND AMPHETAMINE SULFATE 7.5; 7.5; 7.5; 7.5 MG/1; MG/1; MG/1; MG/1
30 CAPSULE, EXTENDED RELEASE ORAL DAILY
Qty: 30 CAPSULE | Refills: 0 | Status: SHIPPED | OUTPATIENT
Start: 2023-02-24 | End: 2023-03-24

## 2022-12-30 RX ORDER — SILDENAFIL 100 MG/1
50-100 TABLET, FILM COATED ORAL DAILY PRN
Qty: 30 TABLET | Refills: 11 | Status: SHIPPED | OUTPATIENT
Start: 2022-12-30 | End: 2023-11-16

## 2022-12-30 ASSESSMENT — ANXIETY QUESTIONNAIRES
8. IF YOU CHECKED OFF ANY PROBLEMS, HOW DIFFICULT HAVE THESE MADE IT FOR YOU TO DO YOUR WORK, TAKE CARE OF THINGS AT HOME, OR GET ALONG WITH OTHER PEOPLE?: SOMEWHAT DIFFICULT
3. WORRYING TOO MUCH ABOUT DIFFERENT THINGS: NOT AT ALL
2. NOT BEING ABLE TO STOP OR CONTROL WORRYING: NOT AT ALL
7. FEELING AFRAID AS IF SOMETHING AWFUL MIGHT HAPPEN: NOT AT ALL
GAD7 TOTAL SCORE: 2
GAD7 TOTAL SCORE: 2
7. FEELING AFRAID AS IF SOMETHING AWFUL MIGHT HAPPEN: NOT AT ALL
5. BEING SO RESTLESS THAT IT IS HARD TO SIT STILL: NOT AT ALL
1. FEELING NERVOUS, ANXIOUS, OR ON EDGE: SEVERAL DAYS
4. TROUBLE RELAXING: NOT AT ALL
IF YOU CHECKED OFF ANY PROBLEMS ON THIS QUESTIONNAIRE, HOW DIFFICULT HAVE THESE PROBLEMS MADE IT FOR YOU TO DO YOUR WORK, TAKE CARE OF THINGS AT HOME, OR GET ALONG WITH OTHER PEOPLE: SOMEWHAT DIFFICULT
GAD7 TOTAL SCORE: 2
6. BECOMING EASILY ANNOYED OR IRRITABLE: SEVERAL DAYS

## 2022-12-30 ASSESSMENT — ENCOUNTER SYMPTOMS
BACK PAIN: 0
SHORTNESS OF BREATH: 0
BRUISES/BLEEDS EASILY: 0
ROS SKIN COMMENTS: HAIR LOSS
CHILLS: 0
ABDOMINAL PAIN: 0
WOUND: 0
FEVER: 0
NERVOUS/ANXIOUS: 1
COUGH: 0

## 2022-12-30 ASSESSMENT — PATIENT HEALTH QUESTIONNAIRE - PHQ9
10. IF YOU CHECKED OFF ANY PROBLEMS, HOW DIFFICULT HAVE THESE PROBLEMS MADE IT FOR YOU TO DO YOUR WORK, TAKE CARE OF THINGS AT HOME, OR GET ALONG WITH OTHER PEOPLE: NOT DIFFICULT AT ALL
SUM OF ALL RESPONSES TO PHQ QUESTIONS 1-9: 3
SUM OF ALL RESPONSES TO PHQ QUESTIONS 1-9: 3

## 2022-12-30 ASSESSMENT — PAIN SCALES - GENERAL: PAINLEVEL: NO PAIN (0)

## 2022-12-30 NOTE — PATIENT INSTRUCTIONS
Blood pressure is well controlled.   Medications refilled.     Last labs improved.     Keep working to cut down on your beer consumption.     Return in approximately 3 months - or sooner as needed for follow-up with Dr. Salcido.  - Annual Follow-up / Physical + controlled med refills    Clinic : [phone]  Appointment line: [phone]

## 2022-12-30 NOTE — NURSING NOTE
"Chief Complaint   Patient presents with     Pain Management         Initial BP (!) 160/88 (BP Location: Right arm, Patient Position: Sitting, Cuff Size: Adult Regular)   Pulse 108   Temp 97.9  F (36.6  C) (Temporal)   Resp 20   Ht 1.784 m (5' 10.25\")   Wt 80.6 kg (177 lb 9.6 oz)   SpO2 96%   BMI 25.30 kg/m   Estimated body mass index is 25.3 kg/m  as calculated from the following:    Height as of this encounter: 1.784 m (5' 10.25\").    Weight as of this encounter: 80.6 kg (177 lb 9.6 oz).       FOOD SECURITY SCREENING QUESTIONS:    The next two questions are to help us understand your food security.  If you are feeling you need any assistance in this area, we have resources available to support you today.    Hunger Vital Signs:  Within the past 12 months we worried whether our food would run out before we got money to buy more. Never  Within the past 12 months the food we bought just didn't last and we didn't have money to get more. Never    Advance Care Directive on file? no      Medication reconciliation complete.      Yogi Lucas,on 12/30/2022 at 3:21 PM        "

## 2022-12-30 NOTE — PROGRESS NOTES
Assessment & Plan   Kike Hess is a 42 year old, presenting for the following health issues:      ICD-10-CM    1. Recurrent major depressive disorder, in full remission (H)  F33.42 amitriptyline (ELAVIL) 25 MG tablet     escitalopram (LEXAPRO) 10 MG tablet      2. Benign essential hypertension  I10 amLODIPine (NORVASC) 5 MG tablet     losartan (COZAAR) 100 MG tablet      3. Other long term (current) drug therapy - 7/6/2022 - Adderall and Ativan  Z79.899 amphetamine-dextroamphetamine (ADDERALL XR) 30 MG 24 hr capsule     amphetamine-dextroamphetamine (ADDERALL XR) 30 MG 24 hr capsule     amphetamine-dextroamphetamine (ADDERALL XR) 30 MG 24 hr capsule     LORazepam (ATIVAN) 0.5 MG tablet      4. Attention deficit hyperactivity disorder (ADHD), combined type  F90.2 amphetamine-dextroamphetamine (ADDERALL XR) 30 MG 24 hr capsule     amphetamine-dextroamphetamine (ADDERALL XR) 30 MG 24 hr capsule     amphetamine-dextroamphetamine (ADDERALL XR) 30 MG 24 hr capsule     LORazepam (ATIVAN) 0.5 MG tablet      5. Allergic rhinitis due to cat hair  J30.81 budesonide (RINOCORT AQUA) 32 MCG/ACT nasal spray      6. Hypertriglyceridemia  E78.1 fenofibrate (TRICOR) 145 MG tablet      7. Hair loss  L65.9 finasteride (PROPECIA) 1 MG tablet      8. Anxiety  F41.9 LORazepam (ATIVAN) 0.5 MG tablet      9. Inflammatory bowel disease -- not definitive of UC vs chrons based on blood work in 2006  K52.9 mesalamine (DELZICOL) 400 MG DR capsule      10. Familial hypercholesterolemia - at least heterozygote  E78.01 omega-3 acid ethyl esters (LOVAZA) 1 g capsule     rosuvastatin (CRESTOR) 40 MG tablet      11. Heartburn  R12 omeprazole (PRILOSEC) 20 MG DR capsule      12. Motion sickness, subsequent encounter  T75.3XXD scopolamine (TRANSDERM) 1 MG/3DAYS 72 hr patch      13. Erectile dysfunction, unspecified erectile dysfunction type  N52.9 sildenafil (VIAGRA) 100 MG tablet      Patient presents for follow-up multiple  issues.    Recurrent major depression, currently in remission.  Doing well with Lexapro and amitriptyline combination.  Needs refills    Hypertension, initial blood pressure elevated, recheck improved but still elevated.  Good previously.  States that his mother is in surgery today.    Chronic anxiety, ongoing.  Also has ADHD.  Uses lorazepam regularly and Adderall as directed.   website reviewed.  No opiate use.  Proper medication use and misuse reviewed.  Patient has been using medications appropriately.  Urine drug screen is up-to-date.  Controlled substance agreement is up-to-date.  Prescriptions refilled x3 months.    Allergic rhinitis, ongoing.  Budesonide nasal spray has been helpful.  Needs refills.    Chronic hair loss, continues with finasteride, tolerating well.  Needs refills    High blood triglyceride levels, significantly elevated previously, significantly improved with fenofibrate and Crestor and omega-3.  Plans to consider starting niacinamide as well.  Does drink alcohol on a regular basis, advised that this can cause significant triglyceride elevation and to consider dose reduction.    Inflammatory bowel disease, ongoing, takes mesalamine daily.    Familial hyperlipidemia, see above.  Takes Lovaza, Crestor, fenofibrate.    Heartburn, ongoing, significant improvement with omeprazole use.  Needs refills.    Chronic motion sickness, uses scopolamine patches quite regularly.  Needs refills    Erectile dysfunction, tolerating Viagra well without side effects.  Needs refills.    Encouraged regular exercise.     Return in about 12 weeks (around 3/24/2023) for Annual Physical Exam + Controlled med refills.    Jarad Salcido MD  [facility]     Last PHQ-9 12/30/2022   1.  Little interest or pleasure in doing things 0   2.  Feeling down, depressed, or hopeless 0   3.  Trouble falling or staying asleep, or sleeping too much 0   4.  Feeling tired or having little energy 1   5.  Poor  appetite or overeating 1   6.  Feeling bad about yourself 0   7.  Trouble concentrating 1   8.  Moving slowly or restless 0   Q9: Thoughts of better off dead/self-harm past 2 weeks 0   PHQ-9 Total Score 3   Difficulty at work, home, or with people -     ANNMARIE-7  12/30/2022   1. Feeling nervous, anxious, or on edge 1   2. Not being able to stop or control worrying 0   3. Worrying too much about different things 0   4. Trouble relaxing 0   5. Being so restless that it is hard to sit still 0   6. Becoming easily annoyed or irritable 1   7. Feeling afraid, as if something awful might happen 0   ANNMARIE-7 Total Score 2   If you checked any problems, how difficult have they made it for you to do your work, take care of things at home, or get along with other people? Somewhat difficult     PEG Score 12/30/2022   PEG Total Score 0      Subjective   Medication Refill      History of Present Illness       Mental Health Follow-up:  Patient presents to follow-up on Depression & Anxiety.Patient's depression since last visit has been:  No change  The patient is not having other symptoms associated with depression.  Patient's anxiety since last visit has been:  Good  The patient is not having other symptoms associated with anxiety.  Any significant life events: No  Patient is not feeling anxious or having panic attacks.  Patient has no concerns about alcohol or drug use.    Hypertension: He presents for follow up of hypertension.  He does check blood pressure  regularly outside of the clinic. Outside blood pressures have been over 140/90. He follows a low salt diet.      Today's PHQ-9         PHQ-9 Total Score: 3    PHQ-9 Q9 Thoughts of better off dead/self-harm past 2 weeks :   Not at all    How difficult have these problems made it for you to do your work, take care of things at home, or get along with other people: Not difficult at all  Today's ANNMARIE-7 Score: 2     Review of Systems   Constitutional: Negative for chills and fever.  "  HENT: Negative for congestion.    Eyes:        Intermittent corneal / eye irritation / inflammation   Respiratory: Negative for cough and shortness of breath.    Cardiovascular: Negative for chest pain.   Gastrointestinal: Negative for abdominal pain.   Genitourinary: Positive for impotence (intermittent).   Musculoskeletal: Negative for back pain.   Skin: Negative for rash and wound.        Hair loss   Allergic/Immunologic: Positive for environmental allergies.   Neurological: Negative for syncope.   Hematological: Does not bruise/bleed easily.   Psychiatric/Behavioral: The patient is nervous/anxious (improved with intermittent ativan).         ADHD - much better with adderall. Able to work full time.   All other systems reviewed and are negative.         Objective    BP (!) 152/88 (BP Location: Right arm, Patient Position: Sitting, Cuff Size: Adult Regular)   Pulse 108   Temp 97.9  F (36.6  C) (Temporal)   Resp 20   Ht 1.784 m (5' 10.25\")   Wt 80.6 kg (177 lb 9.6 oz)   SpO2 96%   BMI 25.30 kg/m    Body mass index is 25.3 kg/m .  Physical Exam  Constitutional:       General: He is not in acute distress.     Appearance: He is well-developed. He is not diaphoretic.   HENT:      Head: Normocephalic and atraumatic.   Eyes:      General: No scleral icterus.     Conjunctiva/sclera: Conjunctivae normal.   Cardiovascular:      Rate and Rhythm: Normal rate and regular rhythm.      Pulses: Normal pulses.   Pulmonary:      Effort: Pulmonary effort is normal.      Breath sounds: Normal breath sounds.   Abdominal:      Palpations: Abdomen is soft.      Tenderness: There is no abdominal tenderness.   Musculoskeletal:         General: No deformity.      Cervical back: Neck supple.      Right lower leg: No edema.      Left lower leg: No edema.   Lymphadenopathy:      Cervical: No cervical adenopathy.   Skin:     General: Skin is warm and dry.      Findings: No rash.   Neurological:      Mental Status: He is alert and " oriented to person, place, and time. Mental status is at baseline.   Psychiatric:         Mood and Affect: Mood normal.         Behavior: Behavior normal.

## 2023-01-30 DIAGNOSIS — T75.3XXD MOTION SICKNESS, SUBSEQUENT ENCOUNTER: ICD-10-CM

## 2023-02-03 RX ORDER — SCOLOPAMINE TRANSDERMAL SYSTEM 1 MG/1
1 PATCH, EXTENDED RELEASE TRANSDERMAL
Qty: 48 PATCH | Refills: 11 | OUTPATIENT
Start: 2023-02-03

## 2023-02-03 NOTE — TELEPHONE ENCOUNTER
[facility] sent Rx request for the following:      Redundant refill request refused: Too soon:     Disp Refills Start End LISHA   scopolamine (TRANSDERM) 1 MG/3DAYS 72 hr patch 48 patch 11 12/30/2022  No   Sig - Route: Place 1 patch onto the skin every 72 hours - Transdermal   Sent to pharmacy as: Scopolamine 1 MG/3DAYS Transdermal Patch 72 Hour (TRANSDERM)   Class: E-Prescribe   Order: [id number]   E-Prescribing Status: Receipt confirmed by pharmacy (12/30/2022  4:01 PM CST)     [facility] - [place], [place] - [address]     Germania Delgado RN .............. 2/3/2023  1:28 PM

## 2023-03-24 ENCOUNTER — OFFICE VISIT (OUTPATIENT)
Dept: INTERNAL MEDICINE | Facility: OTHER | Age: 43
End: 2023-03-24
Attending: INTERNAL MEDICINE
Payer: COMMERCIAL

## 2023-03-24 VITALS
BODY MASS INDEX: 26.15 KG/M2 | OXYGEN SATURATION: 99 % | TEMPERATURE: 97.8 F | HEART RATE: 118 BPM | SYSTOLIC BLOOD PRESSURE: 128 MMHG | HEIGHT: 71 IN | WEIGHT: 186.8 LBS | RESPIRATION RATE: 16 BRPM | DIASTOLIC BLOOD PRESSURE: 68 MMHG

## 2023-03-24 DIAGNOSIS — Z71.85 VACCINE COUNSELING: ICD-10-CM

## 2023-03-24 DIAGNOSIS — T75.3XXD MOTION SICKNESS, SUBSEQUENT ENCOUNTER: ICD-10-CM

## 2023-03-24 DIAGNOSIS — F33.42 RECURRENT MAJOR DEPRESSIVE DISORDER, IN FULL REMISSION (H): ICD-10-CM

## 2023-03-24 DIAGNOSIS — Z00.00 ANNUAL PHYSICAL EXAM: ICD-10-CM

## 2023-03-24 DIAGNOSIS — F90.2 ATTENTION DEFICIT HYPERACTIVITY DISORDER (ADHD), COMBINED TYPE: ICD-10-CM

## 2023-03-24 DIAGNOSIS — I10 BENIGN ESSENTIAL HYPERTENSION: ICD-10-CM

## 2023-03-24 DIAGNOSIS — K52.9 INFLAMMATORY BOWEL DISEASE: ICD-10-CM

## 2023-03-24 DIAGNOSIS — F41.9 ANXIETY: ICD-10-CM

## 2023-03-24 DIAGNOSIS — E78.1 HYPERTRIGLYCERIDEMIA: ICD-10-CM

## 2023-03-24 DIAGNOSIS — E78.01 FAMILIAL HYPERCHOLESTEROLEMIA: Primary | ICD-10-CM

## 2023-03-24 DIAGNOSIS — Z79.899 OTHER LONG TERM (CURRENT) DRUG THERAPY: ICD-10-CM

## 2023-03-24 PROBLEM — F10.90 ALCOHOL USE: Status: ACTIVE | Noted: 2022-01-11

## 2023-03-24 PROBLEM — Z78.9 ALCOHOL USE: Status: ACTIVE | Noted: 2022-01-11

## 2023-03-24 PROCEDURE — 99214 OFFICE O/P EST MOD 30 MIN: CPT | Mod: 25 | Performed by: INTERNAL MEDICINE

## 2023-03-24 PROCEDURE — G0463 HOSPITAL OUTPT CLINIC VISIT: HCPCS

## 2023-03-24 PROCEDURE — 99396 PREV VISIT EST AGE 40-64: CPT | Performed by: INTERNAL MEDICINE

## 2023-03-24 RX ORDER — LISDEXAMFETAMINE DIMESYLATE 30 MG/1
30 CAPSULE ORAL EVERY MORNING
Qty: 30 CAPSULE | Refills: 0 | Status: SHIPPED | OUTPATIENT
Start: 2023-04-21 | End: 2023-06-14

## 2023-03-24 RX ORDER — LORAZEPAM 0.5 MG/1
0.5 TABLET ORAL 2 TIMES DAILY PRN
Qty: 60 TABLET | Refills: 2 | Status: SHIPPED | OUTPATIENT
Start: 2023-03-24 | End: 2023-06-14

## 2023-03-24 RX ORDER — SCOLOPAMINE TRANSDERMAL SYSTEM 1 MG/1
1 PATCH, EXTENDED RELEASE TRANSDERMAL
Qty: 48 PATCH | Refills: 11 | Status: SHIPPED | OUTPATIENT
Start: 2023-03-24 | End: 2024-05-21

## 2023-03-24 RX ORDER — LISDEXAMFETAMINE DIMESYLATE 30 MG/1
30 CAPSULE ORAL EVERY MORNING
Qty: 30 CAPSULE | Refills: 0 | Status: SHIPPED | OUTPATIENT
Start: 2023-05-19 | End: 2023-06-14

## 2023-03-24 RX ORDER — LISDEXAMFETAMINE DIMESYLATE 30 MG/1
30 CAPSULE ORAL EVERY MORNING
Qty: 30 CAPSULE | Refills: 0 | Status: SHIPPED | OUTPATIENT
Start: 2023-03-24 | End: 2023-06-14

## 2023-03-24 ASSESSMENT — ENCOUNTER SYMPTOMS
DIARRHEA: 0
HEADACHES: 0
SHORTNESS OF BREATH: 0
DIZZINESS: 0
PARESTHESIAS: 0
ABDOMINAL PAIN: 0
FREQUENCY: 0
COUGH: 0
FEVER: 0
PALPITATIONS: 0
NAUSEA: 0
HEMATOCHEZIA: 0
WEAKNESS: 0
HEARTBURN: 0
CONSTIPATION: 0
CHILLS: 0
JOINT SWELLING: 0
EYE PAIN: 0
MYALGIAS: 0
HEMATURIA: 0
ARTHRALGIAS: 0
NERVOUS/ANXIOUS: 0
DYSURIA: 0
BRUISES/BLEEDS EASILY: 0
SORE THROAT: 0

## 2023-03-24 ASSESSMENT — PAIN SCALES - GENERAL: PAINLEVEL: NO PAIN (0)

## 2023-03-24 NOTE — PROGRESS NOTES
Assessment & Plan     ICD-10-CM    1. Familial hypercholesterolemia - at least heterozygote  E78.01       2. Attention deficit hyperactivity disorder (ADHD), combined type  F90.2 LORazepam (ATIVAN) 0.5 MG tablet     lisdexamfetamine (VYVANSE) 30 MG capsule     lisdexamfetamine (VYVANSE) 30 MG capsule     lisdexamfetamine (VYVANSE) 30 MG capsule      3. Other long term (current) drug therapy - 7/6/2022 - Adderall and Ativan  Z79.899 LORazepam (ATIVAN) 0.5 MG tablet     lisdexamfetamine (VYVANSE) 30 MG capsule     lisdexamfetamine (VYVANSE) 30 MG capsule     lisdexamfetamine (VYVANSE) 30 MG capsule      4. Anxiety  F41.9 LORazepam (ATIVAN) 0.5 MG tablet      5. Benign essential hypertension  I10       6. Hypertriglyceridemia  E78.1       7. Inflammatory bowel disease -- not definitive of UC vs chrons based on blood work in 2006  K52.9       8. Recurrent major depressive disorder, in full remission (H)  F33.42       9. Motion sickness, subsequent encounter  T75.3XXD scopolamine (TRANSDERM) 1 MG/3DAYS 72 hr patch      10. Vaccine counseling  Z71.85       11. Annual physical exam  Z00.00       Patient presents for annual physical examination as well as follow-up multiple issues.    ADHD, chronic.  Ongoing.  Has chronic anxiety as well.  Using combination of lorazepam for his chronic anxiety as well as Vyvanse for his chronic ADHD.  Currently working full-time.  He has seasonal jobs, winter and summer.   website reviewed.  No opiate use.  Proper medication use and misuse reviewed.  Patient has been using medications appropriate.  Controlled substance agreement is up-to-date.  Urine drug screen is up-to-date.  Prescriptions refilled x3 months.    Hypertension. Ongoing. Blood pressure is currently well controlled.  Medication side effects: None. Denies syncope or presyncope.  No changes for now. Continue -amlodipine, losartan.   Medication list reviewed/updated. Refills completed as needed.      Familial hyperlipidemia.   Ongoing. LDL is at goal: Yes. Triglycerides are at goal: No.  Hopefully lifestyle modifications will improve cholesterol levels, otherwise we will need to consider additional medication dose changes or medication changes.  Medication side effects reported: None. Continue current medications -Crestor, fenofibrate, Lovaza. Medication list reviewed/updated. Refills completed as needed.  Recent Labs   Lab Test 07/06/22  1022 10/15/21  0749   CHOL 182 172   HDL 64 54   LDL 76 77   TRIG 209* 203*       Inflammatory bowel disease, chronic.  Still taking mesalamine regularly.  Has not needed to use Medrol Dosepak recently.    History of recurrent depression, currently in remission.  Doing well.  No recent issues.    Motion sickness, chronic.  Quite easily symptomatic.  He would like refills of his scopolamine patch.    Vaccine counseling completed.  Encouraged annual vaccinations.  Consider pneumococcal vaccination.    Encouraged regular exercise.     Return in about 3 months (around 6/14/2023) for Vyvanse refills.    Jarad Salcido MD  [facility]     SUBJECTIVE:   Mayte is a 42 year old who presents for Preventive Visit.  Additional Questions 3/24/2023   Roomed by Yogi Maldonado LPN   Accompanied by n/a     Patient has been advised of split billing requirements and indicates understanding: Yes  Are you in the first 12 months of your Medicare coverage?  No    Healthy Habits:     Getting at least 3 servings of Calcium per day:  Yes    Bi-annual eye exam:  Yes    Dental care twice a year:  Yes    Sleep apnea or symptoms of sleep apnea:  None    Diet:  Regular (no restrictions)    Frequency of exercise:  4-5 days/week    Duration of exercise:  Greater than 60 minutes    Taking medications regularly:  Yes    Barriers to taking medications:  Not applicable    Medication side effects:  None    PHQ-2 Total Score: 0    Additional concerns today:  No      Have you ever done Advance Care Planning? (For example, a  "Health Directive, POLST, or a discussion with a medical provider or your loved ones about your wishes): No, advance care planning information given to patient to review.  Patient plans to discuss their wishes with loved ones or provider.       Reviewed and updated as needed this visit by clinical staff   Tobacco  Allergies  Meds  Problems  Med Hx  Surg Hx  Fam Hx  Soc   Hx        Reviewed and updated as needed this visit by Provider   Tobacco  Allergies  Meds  Problems  Med Hx  Surg Hx  Fam Hx         Social History     Tobacco Use     Smoking status: Never     Smokeless tobacco: Never   Substance Use Topics     Alcohol use: Yes     Alcohol/week: 24.0 standard drinks     Types: 24 Cans of beer per week     Comment: Pt reports \" drinks a case a week\"     Alcohol Use 3/24/2023   Prescreen: >3 drinks/day or >7 drinks/week? No     Do you have a current opioid prescription? No  Do you use any other controlled substances or medications that are not prescribed by a provider? Alcohol    Current providers sharing in care for this patient include:   Patient Care Team:  Jarad Salcido MD as PCP - General (Internal Medicine)  Jarad Salcido MD as Assigned PCP    The following health maintenance items are reviewed in Epic and correct as of today:  Health Maintenance   Topic Date Due     HEPATITIS B IMMUNIZATION (1 of 3 - 3-dose series) Never done     Pneumococcal Vaccine: Pediatrics (0 to 5 Years) and At-Risk Patients (6 to 64 Years) (1 - PCV) Never done     PHQ-9  06/30/2023     YEARLY PREVENTIVE VISIT  03/24/2024     LIPID  07/06/2027     ADVANCE CARE PLANNING  03/24/2028     DTAP/TDAP/TD IMMUNIZATION (2 - Td or Tdap) 04/04/2028     HEPATITIS C SCREENING  Completed     INFLUENZA VACCINE  Completed     COVID-19 Vaccine  Completed     HIV SCREENING  Addressed     DEPRESSION ACTION PLAN  Addressed     IPV IMMUNIZATION  Aged Out     MENINGITIS IMMUNIZATION  Aged Out     Review of Systems   Constitutional: Negative " "for chills and fever.   HENT: Negative for congestion, ear pain, hearing loss and sore throat.    Eyes: Negative for pain and visual disturbance.   Respiratory: Negative for cough and shortness of breath.    Cardiovascular: Negative for chest pain, palpitations and peripheral edema.   Gastrointestinal: Negative for abdominal pain, constipation, diarrhea, heartburn, hematochezia and nausea.   Genitourinary: Negative for dysuria, frequency, genital sores, hematuria, impotence, penile discharge and urgency.   Musculoskeletal: Negative for arthralgias, joint swelling and myalgias.   Skin: Negative for rash.   Allergic/Immunologic: Negative for immunocompromised state.   Neurological: Negative for dizziness, weakness, headaches and paresthesias.   Hematological: Does not bruise/bleed easily.   Psychiatric/Behavioral: Negative for mood changes. The patient is not nervous/anxious.        OBJECTIVE:   /68 (BP Location: Right arm, Patient Position: Sitting, Cuff Size: Adult Regular)   Pulse 118   Temp 97.8  F (36.6  C) (Temporal)   Resp 16   Ht 1.791 m (5' 10.5\")   Wt 84.7 kg (186 lb 12.8 oz)   SpO2 99%   BMI 26.42 kg/m   Estimated body mass index is 26.42 kg/m  as calculated from the following:    Height as of this encounter: 1.791 m (5' 10.5\").    Weight as of this encounter: 84.7 kg (186 lb 12.8 oz).  Physical Exam  Constitutional:       General: He is not in acute distress.     Appearance: He is well-developed. He is not diaphoretic.   HENT:      Head: Normocephalic and atraumatic.   Eyes:      General: No scleral icterus.     Conjunctiva/sclera: Conjunctivae normal.   Cardiovascular:      Rate and Rhythm: Normal rate and regular rhythm.      Pulses: Normal pulses.   Pulmonary:      Effort: Pulmonary effort is normal.      Breath sounds: Normal breath sounds.   Abdominal:      Palpations: Abdomen is soft.      Tenderness: There is no abdominal tenderness.   Musculoskeletal:         General: No deformity.     " " Cervical back: Neck supple.      Right lower leg: No edema.      Left lower leg: No edema.   Lymphadenopathy:      Cervical: No cervical adenopathy.   Skin:     General: Skin is warm and dry.      Findings: No rash.   Neurological:      Mental Status: He is alert. Mental status is at baseline.   Psychiatric:         Mood and Affect: Mood normal.         Behavior: Behavior normal.         Diagnostic Test Results:  Labs reviewed in Epic    Patient has been advised of split billing requirements and indicates understanding: Yes    COUNSELING:  Reviewed preventive health counseling, as reflected in patient instructions  Special attention given to:       Regular exercise       Healthy diet/nutrition       Vision screening       Hearing screening       Dental care       Bladder control       Fall risk prevention       Immunizations    BMI:   Estimated body mass index is 26.42 kg/m  as calculated from the following:    Height as of this encounter: 1.791 m (5' 10.5\").    Weight as of this encounter: 84.7 kg (186 lb 12.8 oz).     He reports that he has never smoked. He has never used smokeless tobacco.    Appropriate preventive services were discussed with this patient, including applicable screening as appropriate for cardiovascular disease, diabetes, osteopenia/osteoporosis, and glaucoma.  As appropriate for age/gender, discussed screening for colorectal cancer, prostate cancer, breast cancer, and cervical cancer. Checklist reviewing preventive services available has been given to the patient.    Reviewed patients plan of care and provided an AVS. The Complex Care Plan (for patients with higher acuity and needing more deliberate coordination of services) for Kike meets the Care Plan requirement. This Care Plan has been established and reviewed with the Patient.          Jarad Salcido MD  [facility]    Identified Health Risks:  I have reviewed Opioid Use Disorder and Substance Use Disorder risk " factors and made any needed referrals.

## 2023-03-24 NOTE — PATIENT INSTRUCTIONS
Adderall changed to Vyvanse.     Blood pressure is well controlled.     Medications refilled.     Return in approximately 3 month(s), or sooner as needed for follow-up with Dr. Salcido.  - vyvanse refills.     Clinic : [phone]  Appointment line: [phone]

## 2023-03-24 NOTE — NURSING NOTE
"Chief Complaint   Patient presents with     Physical     Pain Management         Initial /68 (BP Location: Right arm, Patient Position: Sitting, Cuff Size: Adult Regular)   Pulse 118   Temp 97.8  F (36.6  C) (Temporal)   Resp 16   Ht 1.791 m (5' 10.5\")   Wt 84.7 kg (186 lb 12.8 oz)   SpO2 99%   BMI 26.42 kg/m   Estimated body mass index is 26.42 kg/m  as calculated from the following:    Height as of this encounter: 1.791 m (5' 10.5\").    Weight as of this encounter: 84.7 kg (186 lb 12.8 oz).       FOOD SECURITY SCREENING QUESTIONS:    The next two questions are to help us understand your food security.  If you are feeling you need any assistance in this area, we have resources available to support you today.    Hunger Vital Signs:  Within the past 12 months we worried whether our food would run out before we got money to buy more. Never  Within the past 12 months the food we bought just didn't last and we didn't have money to get more. Never    Advance Care Directive on file? no      Medication reconciliation complete.      Yogi Lucas,on 3/24/2023 at 4:05 PM        "

## 2023-06-14 ENCOUNTER — OFFICE VISIT (OUTPATIENT)
Dept: INTERNAL MEDICINE | Facility: OTHER | Age: 43
End: 2023-06-14
Attending: INTERNAL MEDICINE
Payer: COMMERCIAL

## 2023-06-14 VITALS
OXYGEN SATURATION: 97 % | DIASTOLIC BLOOD PRESSURE: 72 MMHG | RESPIRATION RATE: 16 BRPM | TEMPERATURE: 98 F | HEART RATE: 104 BPM | BODY MASS INDEX: 26.63 KG/M2 | HEIGHT: 69 IN | WEIGHT: 179.8 LBS | SYSTOLIC BLOOD PRESSURE: 132 MMHG

## 2023-06-14 DIAGNOSIS — Z79.899 OTHER LONG TERM (CURRENT) DRUG THERAPY: ICD-10-CM

## 2023-06-14 DIAGNOSIS — F90.2 ATTENTION DEFICIT HYPERACTIVITY DISORDER (ADHD), COMBINED TYPE: Primary | ICD-10-CM

## 2023-06-14 DIAGNOSIS — F41.9 ANXIETY: ICD-10-CM

## 2023-06-14 DIAGNOSIS — Z23 NEED FOR 23-POLYVALENT PNEUMOCOCCAL POLYSACCHARIDE VACCINE: ICD-10-CM

## 2023-06-14 PROCEDURE — G0463 HOSPITAL OUTPT CLINIC VISIT: HCPCS | Mod: 25

## 2023-06-14 PROCEDURE — 90471 IMMUNIZATION ADMIN: CPT

## 2023-06-14 PROCEDURE — 99214 OFFICE O/P EST MOD 30 MIN: CPT | Performed by: INTERNAL MEDICINE

## 2023-06-14 PROCEDURE — G0009 ADMIN PNEUMOCOCCAL VACCINE: HCPCS

## 2023-06-14 RX ORDER — LORAZEPAM 0.5 MG/1
0.5 TABLET ORAL 2 TIMES DAILY PRN
Qty: 60 TABLET | Refills: 2 | Status: SHIPPED | OUTPATIENT
Start: 2023-06-14 | End: 2023-11-16

## 2023-06-14 RX ORDER — LISDEXAMFETAMINE DIMESYLATE 30 MG/1
30 CAPSULE ORAL EVERY MORNING
Qty: 30 CAPSULE | Refills: 0 | Status: SHIPPED | OUTPATIENT
Start: 2023-06-14 | End: 2023-11-16

## 2023-06-14 RX ORDER — LISDEXAMFETAMINE DIMESYLATE 30 MG/1
30 CAPSULE ORAL EVERY MORNING
Qty: 30 CAPSULE | Refills: 0 | Status: SHIPPED | OUTPATIENT
Start: 2023-08-09 | End: 2023-11-16

## 2023-06-14 RX ORDER — LISDEXAMFETAMINE DIMESYLATE 30 MG/1
30 CAPSULE ORAL EVERY MORNING
Qty: 30 CAPSULE | Refills: 0 | Status: SHIPPED | OUTPATIENT
Start: 2023-07-12 | End: 2023-11-16

## 2023-06-14 ASSESSMENT — ENCOUNTER SYMPTOMS
ROS SKIN COMMENTS: HAIR LOSS
ABDOMINAL PAIN: 0
CHILLS: 0
WOUND: 0
BRUISES/BLEEDS EASILY: 0
NERVOUS/ANXIOUS: 1
FEVER: 0
BACK PAIN: 0
SHORTNESS OF BREATH: 0
COUGH: 0

## 2023-06-14 ASSESSMENT — ANXIETY QUESTIONNAIRES
1. FEELING NERVOUS, ANXIOUS, OR ON EDGE: NOT AT ALL
6. BECOMING EASILY ANNOYED OR IRRITABLE: NOT AT ALL
5. BEING SO RESTLESS THAT IT IS HARD TO SIT STILL: NOT AT ALL
8. IF YOU CHECKED OFF ANY PROBLEMS, HOW DIFFICULT HAVE THESE MADE IT FOR YOU TO DO YOUR WORK, TAKE CARE OF THINGS AT HOME, OR GET ALONG WITH OTHER PEOPLE?: NOT DIFFICULT AT ALL
3. WORRYING TOO MUCH ABOUT DIFFERENT THINGS: SEVERAL DAYS
4. TROUBLE RELAXING: NOT AT ALL
GAD7 TOTAL SCORE: 2
IF YOU CHECKED OFF ANY PROBLEMS ON THIS QUESTIONNAIRE, HOW DIFFICULT HAVE THESE PROBLEMS MADE IT FOR YOU TO DO YOUR WORK, TAKE CARE OF THINGS AT HOME, OR GET ALONG WITH OTHER PEOPLE: NOT DIFFICULT AT ALL
2. NOT BEING ABLE TO STOP OR CONTROL WORRYING: NOT AT ALL
GAD7 TOTAL SCORE: 2
GAD7 TOTAL SCORE: 2
7. FEELING AFRAID AS IF SOMETHING AWFUL MIGHT HAPPEN: SEVERAL DAYS
7. FEELING AFRAID AS IF SOMETHING AWFUL MIGHT HAPPEN: SEVERAL DAYS

## 2023-06-14 ASSESSMENT — PAIN SCALES - GENERAL: PAINLEVEL: NO PAIN (0)

## 2023-06-14 ASSESSMENT — PATIENT HEALTH QUESTIONNAIRE - PHQ9
SUM OF ALL RESPONSES TO PHQ QUESTIONS 1-9: 2
SUM OF ALL RESPONSES TO PHQ QUESTIONS 1-9: 2
10. IF YOU CHECKED OFF ANY PROBLEMS, HOW DIFFICULT HAVE THESE PROBLEMS MADE IT FOR YOU TO DO YOUR WORK, TAKE CARE OF THINGS AT HOME, OR GET ALONG WITH OTHER PEOPLE: NOT DIFFICULT AT ALL

## 2023-06-14 NOTE — PROGRESS NOTES
Assessment & Plan     ICD-10-CM    1. Attention deficit hyperactivity disorder (ADHD), combined type  F90.2 lisdexamfetamine (VYVANSE) 30 MG capsule     lisdexamfetamine (VYVANSE) 30 MG capsule     lisdexamfetamine (VYVANSE) 30 MG capsule     LORazepam (ATIVAN) 0.5 MG tablet      2. Other long term (current) drug therapy - 7/6/2022 - Adderall and Ativan  Z79.899 lisdexamfetamine (VYVANSE) 30 MG capsule     lisdexamfetamine (VYVANSE) 30 MG capsule     lisdexamfetamine (VYVANSE) 30 MG capsule     LORazepam (ATIVAN) 0.5 MG tablet      3. Anxiety  F41.9 LORazepam (ATIVAN) 0.5 MG tablet      4. Need for 23-polyvalent pneumococcal polysaccharide vaccine  Z23 GH IMM-  PNEUMOCOCCAL VACCINE,ADULT,SQ OR IM        Patient presents for controlled medication management follow-up appointment.    Patient has ADHD, chronic anxiety.  All of these are ongoing.  Using commendation of Vyvanse and lorazepam for symptom management.  Has been doing well with current dosing.  Controlled substance agreement is up-to-date.   website reviewed.  Urine drug screen is up-to-date.  No opiate use.  Prescriptions refilled as noted.    Pneumococcal vaccination is due, orders placed.    Encouraged regular exercise.     Return in about 12 weeks (around 9/6/2023) for - Adderall/Lorazepam review/refills. .    Jarad Salcido MD  [facility]     Subjective   Mayte is a 43 year old, presenting for the following health issues:  Medication Refills  (Vynanse - Lozapam)        6/14/2023     4:01 PM   Additional Questions   Roomed by Yogi OLSON / MARIA E   Accompanied by n/a     History of Present Illness       Mental Health Follow-up:  Patient presents to follow-up on Depression & Anxiety.Patient's depression since last visit has been:  Medium  The patient is not having other symptoms associated with depression.  Patient's anxiety since last visit has been:  Medium  The patient is not having other symptoms associated with anxiety.  Any  significant life events: No  Patient is not feeling anxious or having panic attacks.  Patient has no concerns about alcohol or drug use.    Hyperlipidemia:  He presents for follow up of hyperlipidemia.  He is taking medication to lower cholesterol. He is having myalgia or other side effects to statin medications.    Hypertension: He presents for follow up of hypertension.  He does check blood pressure  regularly outside of the clinic. Outpatient blood pressures have not been over 140/90. He follows a low salt diet.     Reason for visit:  Meds  Symptom onset:  More than a month    He eats 4 or more servings of fruits and vegetables daily.He consumes 0 sweetened beverage(s) daily.He exercises with enough effort to increase his heart rate 9 or less minutes per day.  He exercises with enough effort to increase his heart rate 7 days per week.   He is taking medications regularly.    Today's PHQ-9         PHQ-9 Total Score: 2    PHQ-9 Q9 Thoughts of better off dead/self-harm past 2 weeks :   Not at all    How difficult have these problems made it for you to do your work, take care of things at home, or get along with other people: Not difficult at all  Today's ANNMARIE-7 Score: 2     Review of Systems   Constitutional: Negative for chills and fever.   HENT: Negative for congestion.    Eyes:        Intermittent corneal / eye irritation / inflammation   Respiratory: Negative for cough and shortness of breath.    Cardiovascular: Negative for chest pain.   Gastrointestinal: Negative for abdominal pain.   Genitourinary: Positive for impotence (intermittent).   Musculoskeletal: Negative for back pain.   Skin: Negative for rash and wound.        Hair loss   Allergic/Immunologic: Positive for environmental allergies. Negative for immunocompromised state.   Neurological: Negative for syncope.   Hematological: Does not bruise/bleed easily.   Psychiatric/Behavioral: The patient is nervous/anxious (improved with intermittent ativan).       "   ADHD - much better with adderall. Able to work full time.   All other systems reviewed and are negative.         Objective    /72 (BP Location: Right arm, Patient Position: Sitting, Cuff Size: Adult Regular)   Pulse 104   Temp 98  F (36.7  C) (Temporal)   Resp 16   Ht 1.753 m (5' 9\")   Wt 81.6 kg (179 lb 12.8 oz)   SpO2 97%   BMI 26.55 kg/m    Body mass index is 26.55 kg/m .  Physical Exam  Constitutional:       General: He is not in acute distress.     Appearance: He is well-developed. He is not diaphoretic.   HENT:      Head: Normocephalic and atraumatic.   Eyes:      General: No scleral icterus.     Conjunctiva/sclera: Conjunctivae normal.   Cardiovascular:      Rate and Rhythm: Normal rate and regular rhythm.      Pulses: Normal pulses.   Pulmonary:      Effort: Pulmonary effort is normal.      Breath sounds: Normal breath sounds.   Abdominal:      Palpations: Abdomen is soft.      Tenderness: There is no abdominal tenderness.   Musculoskeletal:         General: No deformity.      Cervical back: Neck supple.      Right lower leg: No edema.      Left lower leg: No edema.   Lymphadenopathy:      Cervical: No cervical adenopathy.   Skin:     General: Skin is warm and dry.      Findings: No rash.   Neurological:      Mental Status: He is alert. Mental status is at baseline.   Psychiatric:         Mood and Affect: Mood normal.         Behavior: Behavior normal.                      "

## 2023-06-14 NOTE — NURSING NOTE
"Chief Complaint   Patient presents with     Medication Refills      Jt Meneses         Initial /72 (BP Location: Right arm, Patient Position: Sitting, Cuff Size: Adult Regular)   Pulse 104   Temp 98  F (36.7  C) (Temporal)   Resp 16   Ht 1.753 m (5' 9\")   Wt 81.6 kg (179 lb 12.8 oz)   SpO2 97%   BMI 26.55 kg/m   Estimated body mass index is 26.55 kg/m  as calculated from the following:    Height as of this encounter: 1.753 m (5' 9\").    Weight as of this encounter: 81.6 kg (179 lb 12.8 oz).       FOOD SECURITY SCREENING QUESTIONS:    The next two questions are to help us understand your food security.  If you are feeling you need any assistance in this area, we have resources available to support you today.    Hunger Vital Signs:  Within the past 12 months we worried whether our food would run out before we got money to buy more. Never  Within the past 12 months the food we bought just didn't last and we didn't have money to get more. Never    Advance Care Directive on file? no      Medication reconciliation complete.      Yogi Jefferson,on 6/14/2023 at 4:08 PM        "

## 2023-06-14 NOTE — PATIENT INSTRUCTIONS
Blood pressure is well controlled.     Medications refilled.     Return in approximately 12 week(s), or sooner as needed for follow-up with Dr. Salcido.  --- Adderall/Lorazepam review/refills.     Clinic : [phone]  Appointment line: [phone]

## 2023-09-11 NOTE — NURSING NOTE
A refill request was received for:  Requested Prescriptions     Pending Prescriptions Disp Refills    TOPIRAMATE 50 MG Oral Tab [Pharmacy Med Name: TOPIRAMATE 50MG TABLETS] 180 tablet 1     Sig: TAKE 1 TABLET(50 MG) BY MOUTH TWICE DAILY     Last refill date:  4/13/23    Last office visit: 9/24/22      Future Appointments   Date Time Provider Ruth Britt   10/5/2023  4:00 PM [address] [place] [address] SLEEP ROOMS [address] [place] [address] [facility] Patient Information     Patient Name MRN Sex Kike Nowak [id number] Male 1980      Nursing Note by Angella Kowalski at 2017  7:45 AM     Author:  Angella Kowalski Service:  (none) Author Type:  (none)     Filed:  2017  8:12 AM Encounter Date:  2017 Status:  Signed     :  Angella Kowalski            Patient here for DOT physical, Visual Acuity Screening - Snellen Chart   Visual acuity OD (right eye): 20/ 32   Visual acuity OS (left eye): 20/ 32   Visual acuity OU (both eyes): 20/ 32   Corrective lenses worn: No   Color screening passed. Angella Kowalski LPN .......................2017  8:08 AM

## 2023-10-08 ENCOUNTER — HOSPITAL ENCOUNTER (EMERGENCY)
Facility: OTHER | Age: 43
Discharge: HOME | End: 2023-10-08
Attending: PHYSICIAN ASSISTANT | Admitting: PHYSICIAN ASSISTANT
Payer: COMMERCIAL

## 2023-10-08 VITALS
RESPIRATION RATE: 16 BRPM | BODY MASS INDEX: 26.51 KG/M2 | WEIGHT: 179 LBS | HEART RATE: 92 BPM | DIASTOLIC BLOOD PRESSURE: 85 MMHG | HEIGHT: 69 IN | TEMPERATURE: 97 F | SYSTOLIC BLOOD PRESSURE: 127 MMHG

## 2023-10-08 DIAGNOSIS — F10.929 ALCOHOL INTOXICATION (H): ICD-10-CM

## 2023-10-08 LAB
ETHANOL SERPL-MCNC: 0.29 G/DL
HOLD SPECIMEN: NORMAL

## 2023-10-08 PROCEDURE — 82077 ASSAY SPEC XCP UR&BREATH IA: CPT | Performed by: PHYSICIAN ASSISTANT

## 2023-10-08 PROCEDURE — 99283 EMERGENCY DEPT VISIT LOW MDM: CPT | Performed by: PHYSICIAN ASSISTANT

## 2023-10-08 PROCEDURE — 99282 EMERGENCY DEPT VISIT SF MDM: CPT | Performed by: PHYSICIAN ASSISTANT

## 2023-10-08 PROCEDURE — 36415 COLL VENOUS BLD VENIPUNCTURE: CPT | Performed by: PHYSICIAN ASSISTANT

## 2023-10-08 ASSESSMENT — ACTIVITIES OF DAILY LIVING (ADL): ADLS_ACUITY_SCORE: 35

## 2023-10-09 NOTE — DISCHARGE INSTRUCTIONS
Recommend moderation for alcohol.  If you have a concern for possible alcohol use disorder, would recommend following up for either inpatient or outpatient treatment, and AA, and mentorship.  I would also recommend following up with mental health services for anxiety and depression.

## 2023-10-09 NOTE — ED TRIAGE NOTES
Patient presents to ER with complaint of alcohol intoxication today and a verbal altercation with mother. Law enforcement reports patient's agitation was causing stress at home with mother and father and aggravating father's heart condition. Patient alert and oriented in triage. Cooperative with staff. No SI or HI. Pressured speech and some alternative word usage. /85   Pulse 92   Temp 97  F (36.1  C) (Tympanic)   Resp 16         Triage Assessment       Row Name 10/08/23 2032       Triage Assessment (Adult)    Airway WDL WDL       Respiratory WDL    Respiratory WDL WDL       Skin Circulation/Temperature WDL    Skin Circulation/Temperature WDL WDL       Cardiac WDL    Cardiac WDL WDL       Peripheral/Neurovascular WDL    Peripheral Neurovascular WDL WDL       Cognitive/Neuro/Behavioral WDL    Cognitive/Neuro/Behavioral WDL WDL

## 2023-10-09 NOTE — ED PROVIDER NOTES
"      EMERGENCY DEPARTMENT ENCOUNTER      NAME: Kike Hess  AGE: 43 year old male  YOB: 1980  MRN: [id number]  EVALUATION DATE & TIME: 10/8/2023  8:39 PM    PCP: Jarad Salcido    ED PROVIDER: Harshad Gaxiola PA-C       CHIEF COMPLAINT:  Chief Complaint   Patient presents with    Alcohol Intoxication       ED COURSE, MEDICAL DECISION MAKING, FINAL IMPRESSION AND PLAN:     The patient was interviewed and examined.  HPI and physical exam as below.  Differential diagnosis and MDM Key Documentation Elements as below.  Vitals and triage note were reviewed.  /85   Pulse 92   Temp 97  F (36.1  C) (Tympanic)   Resp 16   Ht 1.753 m (5' 9\")   Wt 81.2 kg (179 lb)   BMI 26.43 kg/m      Kike Hess is a pleasant 43 year old male with history of ulcerative colitis, depression, migraine who presents to the ER today via a lawn for cement for acute alcohol intoxication.  Patient was brought in after having a verbal altercation with his mother.  Patient states that he has been under a lot of stress and was drinking today.  Currently denying any alcohol withdrawal symptoms.  Denying any pain, nausea or vomiting, or tremor.  Patient denying any suicidal or homicidal ideation.  Denies any history of alcohol use disorder.  Patient has no other complaints.    Differential includes but is not limited to alcohol intoxication, alcohol withdrawal, alcohol use disorder    Patient is afebrile with otherwise normal vitals.  Patient was in no acute distress.  Patient was acutely intoxicated but was otherwise well-appearing.  Patient was polite and cooperative.  Patient denies any suicidal or homicidal ideation.  Physical exam today was otherwise benign.  Lungs are clear.  Heart regular.  No abdominal tenderness.  No jaundice.  JOHN today was 0.29.    Patient was offered interventions like IV fluids and IV Zofran but patient declined states he is otherwise feels well.  Patient was offered mental " health services but patient declined.  Patient was offered detox but patient states he does not need detox.  Denies any history of seizure withdrawals.  Does not want to stay here in the hospital.  Patient is acutely intoxicated but otherwise cleared to go home.  Patient did have a ride that was able to come pick him up.  Patient encouraged to come back to the ER for any worsening symptoms.  Patient is also encouraged to reach out to detox or an outpatient/inpatient program if he has any concerns for possible call use disorder.  Patient is encouraged to reach out to mental health if he has any concerns with anxiety or depression.    Assessment / Plan:   Diagnosis Comments   1. Alcohol intoxication (H24)  No seizure or withdrawal history.  Patient otherwise without any abnormal findings here on today's evaluation.  Patient is otherwise cleared to go home.  Patient did have a friend that was able to come pick him up.  Patient will be discharged.  Return to the ER for any worsening of symptoms.  Currently no indication for admission or intubation.            Medications given during today's ER visit:  Medications - No data to display    New prescriptions started at today's ER visit  Discharge Medication List as of 10/8/2023  9:34 PM            Pertinent Labs & Imaging studies reviewed. (See chart for details)  Results for orders placed or performed during the hospital encounter of 10/08/23   Ethanol GH   Result Value Ref Range    Alcohol ethyl 0.29 (H) <=0.01 g/dL     No results found for: ABORH      Reassessments, Medications, Interventions, & Response to Treatments:  Patient remained stable during his course of stay here in the ER.  No seizures.  Discussed laboratory results.  Discussed treatment plan and follow-up    Consultations:  None    Decision Rules, Medical Calculators, and Risk Stratification Tools:  None    MDM Key Documentation Elements for Patient's Evaluation:  Differential diagnosis to include high risk  considerations: As above  Escalation to admission/observation considered: Admission/observation considered, but patient does not meet admission criteria  Discussions and management with other clinicians:    3a. Independent interpretation of testing performed by another health professional:  -No  3b. Discussion of management or test interpretation with another health professional: -No  Independent interpretation of tests:  Ordering and/or review of 1 test(s)  Discussion of test interpretations with radiology:  No  History obtained from source other than patient or assessment requiring an independent historian:  No  Review of non-ED/external records:  review of 1 records  Diagnostic tests considered but not ultimately performed/deferred:  -CMP  Prescription medications considered but not prescribed:  -Ativan  Chronic conditions affecting care:  -Pression  Care affected by social determinants of health:  -None    The patient's management involved:   - Laboratory studies      A shared decision making model was used. Time was taken to answer all questions.  Patient and/or associated parties understood and were agreeable to treatment plan.  Plan and all results were discussed. Warning signs and close return precautions to return to the ED given. Copy of results given. Discharged in stable condition. Discharged with discharge instructions outlining plan for further care and follow up.      PPE worn during patient evaluation:  Mask: Yes  Eye Protection: No  Gown: No  Hair cover: No  Face Shield: No  Patient wearing a mask: No    =================================================================    HPI  Maytegonzales Hess is a pleasant 43 year old male with history of ulcerative colitis, depression, migraine who presents to the ER today via a lawn for cement for acute alcohol intoxication.  Patient was brought in after having a verbal altercation with his mother.  Patient states that he has been under a lot of stress and  was drinking today.  Currently denying any alcohol withdrawal symptoms.  Denying any pain, nausea or vomiting, or tremor.  Patient denying any suicidal or homicidal ideation.  Denies any history of alcohol use disorder.  Patient has no other complaints.      REVIEW OF SYSTEMS   Review of Systems  As above, otherwise ROS is unremarkable.      PAST MEDICAL HISTORY:  Past Medical History:   Diagnosis Date    Allergic rhinitis due to animal hair and dander     12/10/2013    Gastro-esophageal reflux disease without esophagitis     12/10/2013    Major depressive disorder, single episode     12/10/2013,Previously followed with Psychiatry - was on Remeron, Adderall, Lexapro in the past. Awaiting to re-establish psychiatry care again.    Migraine without aura and without status migrainosus, not intractable 6/19/2015    Migraine without status migrainosus, not intractable     12/10/2013    Other allergic rhinitis     12/10/2013    Other seasonal allergic rhinitis     12/10/2013    Sleep disorder     12/10/2013    Ulcerative colitis without complications (H)     8/9/2007       PAST SURGICAL HISTORY:  Past Surgical History:   Procedure Laterality Date    APPENDECTOMY OPEN      2/4/13,Dr. Givens/[facility]       CURRENT MEDICATIONS:    Current Outpatient Medications   Medication Instructions    amitriptyline (ELAVIL) 50 mg, Oral, AT BEDTIME    amLODIPine (NORVASC) 5 mg, Oral, DAILY    budesonide (RINOCORT AQUA) 32 MCG/ACT nasal spray Inhale 1 Spray into both nostrils once daily.    escitalopram (LEXAPRO) 10 mg, Oral, DAILY    fenofibrate (TRICOR) 145 mg, Oral, DAILY    finasteride (PROPECIA) 1 mg, Oral, DAILY    lisdexamfetamine (VYVANSE) 30 mg, Oral, EVERY MORNING    lisdexamfetamine (VYVANSE) 30 mg, Oral, EVERY MORNING    lisdexamfetamine (VYVANSE) 30 mg, Oral, EVERY MORNING    LORazepam (ATIVAN) 0.5 mg, Oral, 2 TIMES DAILY PRN    losartan (COZAAR) 100 mg, Oral, DAILY    mesalamine (DELZICOL) 800 mg, Oral, 4 TIMES DAILY PRN     "methylPREDNISolone (MEDROL) 4 MG tablet TAKE AS DIRECTED FOR 10 DAYS FOR ULCERATIVE COLITIS FLAIR. REPEAT EVERY 4 WEEKS IF NEEDED. MAXIMUM OF 40 TABLETS MONTHLY    omega-3 acid ethyl esters (LOVAZA) 1 g, Oral, 4 TIMES DAILY    omeprazole (PRILOSEC) 20 mg, Oral, DAILY, 30-60 minutes prior to 1st meal of the day - limit use as able    rosuvastatin (CRESTOR) 40 mg, Oral, DAILY    scopolamine (TRANSDERM) 1 MG/3DAYS 72 hr patch 1 patch, Transdermal, EVERY 72 HOURS    sildenafil (VIAGRA)  mg, Oral, DAILY PRN    sulfacetamide (BLEPH-10) 10 % ophthalmic solution 1 drop, Ophthalmic, EVERY 4 HOURS WHILE AWAKE    SUMAtriptan (IMITREX) 50 mg, Oral, AT ONSET OF HEADACHE       ALLERGIES:  Allergies   Allergen Reactions    Garlic Itching and Rash     Garlic mustard seed plants    Cats      Other reaction(s): Runny Nose  Itchy eyes and hand swelling    Food      Other reaction(s): Angioedema  Raw Parsnip AND Raw Carrots    Lisinopril Cough       FAMILY HISTORY:  Family History   Problem Relation Age of Onset    Colon Cancer Paternal Grandmother         Cancer-colon    Colon Cancer Maternal Grandmother         Cancer-colon    Other - See Comments Other         Psychiatric illness,Aunt - suicide fall 2013       SOCIAL HISTORY:   Social History     Socioeconomic History    Marital status: Single   Tobacco Use    Smoking status: Never    Smokeless tobacco: Never   Vaping Use    Vaping Use: Never used   Substance and Sexual Activity    Alcohol use: Yes     Alcohol/week: 24.0 standard drinks of alcohol     Types: 24 Cans of beer per week     Comment: Pt reports \" drinks a case a week\"    Drug use: No    Sexual activity: Yes     Partners: Female   Social History Narrative    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     Recently was doing [employer] and [employer] work - now working at [employer], as of Spring 2021.        For family mental health issues, he notes that an " "aunt committed suicide in October 2013 and 3 maternal aunts and his mother are on antidepressants. His maternal grandmother was diagnosed with schizoaffective disorder, as was a male cousin. Attention deficit hyperactivity disorder was significant for an uncle and the client's cousins.       PHYSICAL EXAM    VITAL SIGNS: /85   Pulse 92   Temp 97  F (36.1  C) (Tympanic)   Resp 16   Ht 1.753 m (5' 9\")   Wt 81.2 kg (179 lb)   BMI 26.43 kg/m      Patient Vitals for the past 24 hrs:   BP Temp Temp src Pulse Resp Height Weight   10/08/23 2048 -- -- -- -- -- 1.753 m (5' 9\") 81.2 kg (179 lb)   10/08/23 2030 127/85 97  F (36.1  C) Tympanic 92 16 -- --       Physical Exam  Vitals and nursing note reviewed.   Constitutional:       General: He is not in acute distress.     Appearance: Normal appearance. He is not ill-appearing.      Comments: Acutely intoxicated but alert and oriented and answering questions appropriately.   HENT:      Head: Normocephalic.      Right Ear: Tympanic membrane, ear canal and external ear normal.      Left Ear: Tympanic membrane, ear canal and external ear normal.      Nose: Nose normal.      Mouth/Throat:      Mouth: Mucous membranes are moist.      Pharynx: Oropharynx is clear.   Eyes:      Conjunctiva/sclera: Conjunctivae normal.      Pupils: Pupils are equal, round, and reactive to light.   Cardiovascular:      Rate and Rhythm: Normal rate and regular rhythm.      Pulses: Normal pulses.      Heart sounds: Normal heart sounds.   Pulmonary:      Effort: Pulmonary effort is normal.      Breath sounds: Normal breath sounds.   Abdominal:      General: Abdomen is flat. Bowel sounds are normal.      Palpations: Abdomen is soft.      Tenderness: There is no abdominal tenderness.   Musculoskeletal:         General: Normal range of motion.      Cervical back: Normal range of motion and neck supple.   Skin:     General: Skin is warm and dry.      Capillary Refill: Capillary refill takes less " than 2 seconds.      Coloration: Skin is not jaundiced.   Neurological:      General: No focal deficit present.      Mental Status: He is alert.   Psychiatric:         Mood and Affect: Mood normal.              LABS & RADIOLOGY:  All pertinent labs reviewed and interpreted. Reviewed all pertinent imaging. Please see official radiology report.  Results for orders placed or performed during the hospital encounter of 10/08/23   Ethanol GH   Result Value Ref Range    Alcohol ethyl 0.29 (H) <=0.01 g/dL     No orders to display         I, Luke Gaxiola PA-C, personally performed the services described in this documentation, and it is both accurate and complete.       Harshad Gaxiola PA-C  10/08/23 4090

## 2023-11-16 ENCOUNTER — OFFICE VISIT (OUTPATIENT)
Dept: INTERNAL MEDICINE | Facility: OTHER | Age: 43
End: 2023-11-16
Attending: INTERNAL MEDICINE
Payer: COMMERCIAL

## 2023-11-16 VITALS
HEART RATE: 95 BPM | HEIGHT: 70 IN | SYSTOLIC BLOOD PRESSURE: 112 MMHG | TEMPERATURE: 97.1 F | DIASTOLIC BLOOD PRESSURE: 66 MMHG | RESPIRATION RATE: 18 BRPM | BODY MASS INDEX: 28.03 KG/M2 | WEIGHT: 195.8 LBS | OXYGEN SATURATION: 96 %

## 2023-11-16 DIAGNOSIS — K52.9 INFLAMMATORY BOWEL DISEASE: ICD-10-CM

## 2023-11-16 DIAGNOSIS — L65.9 HAIR LOSS: ICD-10-CM

## 2023-11-16 DIAGNOSIS — J30.81 ALLERGIC RHINITIS DUE TO CAT HAIR: ICD-10-CM

## 2023-11-16 DIAGNOSIS — F90.2 ATTENTION DEFICIT HYPERACTIVITY DISORDER (ADHD), COMBINED TYPE: ICD-10-CM

## 2023-11-16 DIAGNOSIS — F33.42 RECURRENT MAJOR DEPRESSIVE DISORDER, IN FULL REMISSION (H): ICD-10-CM

## 2023-11-16 DIAGNOSIS — I10 BENIGN ESSENTIAL HYPERTENSION: Primary | ICD-10-CM

## 2023-11-16 DIAGNOSIS — G43.009 MIGRAINE WITHOUT AURA AND WITHOUT STATUS MIGRAINOSUS, NOT INTRACTABLE: ICD-10-CM

## 2023-11-16 DIAGNOSIS — F41.9 ANXIETY: ICD-10-CM

## 2023-11-16 DIAGNOSIS — R12 HEARTBURN: ICD-10-CM

## 2023-11-16 DIAGNOSIS — R73.9 ELEVATED RANDOM BLOOD GLUCOSE LEVEL: ICD-10-CM

## 2023-11-16 DIAGNOSIS — E78.1 HYPERTRIGLYCERIDEMIA: ICD-10-CM

## 2023-11-16 DIAGNOSIS — N52.9 ERECTILE DYSFUNCTION, UNSPECIFIED ERECTILE DYSFUNCTION TYPE: ICD-10-CM

## 2023-11-16 DIAGNOSIS — E78.01 FAMILIAL HYPERCHOLESTEROLEMIA: ICD-10-CM

## 2023-11-16 DIAGNOSIS — H10.403 CHRONIC CONJUNCTIVITIS OF BOTH EYES, UNSPECIFIED CHRONIC CONJUNCTIVITIS TYPE: ICD-10-CM

## 2023-11-16 DIAGNOSIS — Z79.899 OTHER LONG TERM (CURRENT) DRUG THERAPY: ICD-10-CM

## 2023-11-16 LAB
ALBUMIN SERPL BCG-MCNC: 4.7 G/DL (ref 3.5–5.2)
ALP SERPL-CCNC: 70 U/L (ref 40–150)
ALT SERPL W P-5'-P-CCNC: 59 U/L (ref 0–70)
ANION GAP SERPL CALCULATED.3IONS-SCNC: 12 MMOL/L (ref 7–15)
AST SERPL W P-5'-P-CCNC: 40 U/L (ref 0–45)
BASOPHILS # BLD AUTO: 0.1 10E3/UL (ref 0–0.2)
BASOPHILS NFR BLD AUTO: 1 %
BILIRUB SERPL-MCNC: 0.2 MG/DL
BUN SERPL-MCNC: 13.7 MG/DL (ref 6–20)
CALCIUM SERPL-MCNC: 10.4 MG/DL (ref 8.6–10)
CHLORIDE SERPL-SCNC: 104 MMOL/L (ref 98–107)
CHOLEST SERPL-MCNC: 247 MG/DL
CREAT SERPL-MCNC: 0.79 MG/DL (ref 0.67–1.17)
DEPRECATED HCO3 PLAS-SCNC: 22 MMOL/L (ref 22–29)
EGFRCR SERPLBLD CKD-EPI 2021: >90 ML/MIN/1.73M2
EOSINOPHIL # BLD AUTO: 0.2 10E3/UL (ref 0–0.7)
EOSINOPHIL NFR BLD AUTO: 2 %
ERYTHROCYTE [DISTWIDTH] IN BLOOD BY AUTOMATED COUNT: 11.7 % (ref 10–15)
GLUCOSE SERPL-MCNC: 109 MG/DL (ref 70–99)
HBA1C MFR BLD: 5.7 % (ref 4–6.2)
HCT VFR BLD AUTO: 48.8 % (ref 40–53)
HDLC SERPL-MCNC: 55 MG/DL
HGB BLD-MCNC: 16.4 G/DL (ref 13.3–17.7)
IMM GRANULOCYTES # BLD: 0.1 10E3/UL
IMM GRANULOCYTES NFR BLD: 1 %
LDLC SERPL CALC-MCNC: 149 MG/DL
LYMPHOCYTES # BLD AUTO: 2.1 10E3/UL (ref 0.8–5.3)
LYMPHOCYTES NFR BLD AUTO: 29 %
MCH RBC QN AUTO: 32.7 PG (ref 26.5–33)
MCHC RBC AUTO-ENTMCNC: 33.6 G/DL (ref 31.5–36.5)
MCV RBC AUTO: 97 FL (ref 78–100)
MONOCYTES # BLD AUTO: 0.7 10E3/UL (ref 0–1.3)
MONOCYTES NFR BLD AUTO: 9 %
NEUTROPHILS # BLD AUTO: 4 10E3/UL (ref 1.6–8.3)
NEUTROPHILS NFR BLD AUTO: 58 %
NONHDLC SERPL-MCNC: 192 MG/DL
NRBC # BLD AUTO: 0 10E3/UL
NRBC BLD AUTO-RTO: 0 /100
PLATELET # BLD AUTO: 159 10E3/UL (ref 150–450)
POTASSIUM SERPL-SCNC: 4.6 MMOL/L (ref 3.4–5.3)
PROT SERPL-MCNC: 7.8 G/DL (ref 6.4–8.3)
RBC # BLD AUTO: 5.01 10E6/UL (ref 4.4–5.9)
SODIUM SERPL-SCNC: 138 MMOL/L (ref 135–145)
TRIGL SERPL-MCNC: 214 MG/DL
WBC # BLD AUTO: 7.1 10E3/UL (ref 4–11)

## 2023-11-16 PROCEDURE — 36415 COLL VENOUS BLD VENIPUNCTURE: CPT | Mod: ZL | Performed by: INTERNAL MEDICINE

## 2023-11-16 PROCEDURE — 90686 IIV4 VACC NO PRSV 0.5 ML IM: CPT

## 2023-11-16 PROCEDURE — 99214 OFFICE O/P EST MOD 30 MIN: CPT | Performed by: INTERNAL MEDICINE

## 2023-11-16 PROCEDURE — 80061 LIPID PANEL: CPT | Mod: ZL | Performed by: INTERNAL MEDICINE

## 2023-11-16 PROCEDURE — 85025 COMPLETE CBC W/AUTO DIFF WBC: CPT | Mod: ZL | Performed by: INTERNAL MEDICINE

## 2023-11-16 PROCEDURE — G0463 HOSPITAL OUTPT CLINIC VISIT: HCPCS | Mod: 25

## 2023-11-16 PROCEDURE — 83036 HEMOGLOBIN GLYCOSYLATED A1C: CPT | Mod: ZL | Performed by: INTERNAL MEDICINE

## 2023-11-16 PROCEDURE — 91320 SARSCV2 VAC 30MCG TRS-SUC IM: CPT

## 2023-11-16 PROCEDURE — 80053 COMPREHEN METABOLIC PANEL: CPT | Mod: ZL | Performed by: INTERNAL MEDICINE

## 2023-11-16 PROCEDURE — G0008 ADMIN INFLUENZA VIRUS VAC: HCPCS

## 2023-11-16 RX ORDER — FINASTERIDE 1 MG/1
1 TABLET, FILM COATED ORAL DAILY
Qty: 90 TABLET | Refills: 4 | Status: SHIPPED | OUTPATIENT
Start: 2023-11-16 | End: 2024-05-21

## 2023-11-16 RX ORDER — SUMATRIPTAN 50 MG/1
50 TABLET, FILM COATED ORAL
Qty: 6 TABLET | Refills: 1 | Status: SHIPPED | OUTPATIENT
Start: 2023-11-16

## 2023-11-16 RX ORDER — AMLODIPINE BESYLATE 5 MG/1
5 TABLET ORAL DAILY
Qty: 90 TABLET | Refills: 4 | Status: SHIPPED | OUTPATIENT
Start: 2023-11-16 | End: 2024-05-21

## 2023-11-16 RX ORDER — LORAZEPAM 0.5 MG/1
0.5 TABLET ORAL 2 TIMES DAILY PRN
Qty: 60 TABLET | Refills: 2 | Status: SHIPPED | OUTPATIENT
Start: 2023-11-16 | End: 2024-02-19

## 2023-11-16 RX ORDER — LOSARTAN POTASSIUM 100 MG/1
100 TABLET ORAL DAILY
Qty: 90 TABLET | Refills: 4 | Status: SHIPPED | OUTPATIENT
Start: 2023-11-16 | End: 2024-05-21

## 2023-11-16 RX ORDER — LISDEXAMFETAMINE DIMESYLATE 30 MG/1
30 CAPSULE ORAL EVERY MORNING
Qty: 30 CAPSULE | Refills: 0 | Status: SHIPPED | OUTPATIENT
Start: 2023-11-16 | End: 2024-02-19

## 2023-11-16 RX ORDER — SULFACETAMIDE SODIUM 100 MG/ML
1 SOLUTION/ DROPS OPHTHALMIC
Qty: 15 ML | Refills: 3 | Status: SHIPPED | OUTPATIENT
Start: 2023-11-16

## 2023-11-16 RX ORDER — SILDENAFIL 100 MG/1
50-100 TABLET, FILM COATED ORAL DAILY PRN
Qty: 30 TABLET | Refills: 11 | Status: SHIPPED | OUTPATIENT
Start: 2023-11-16

## 2023-11-16 RX ORDER — MESALAMINE 400 MG/1
800 CAPSULE, DELAYED RELEASE ORAL 4 TIMES DAILY PRN
Qty: 720 CAPSULE | Refills: 4 | Status: SHIPPED | OUTPATIENT
Start: 2023-11-16 | End: 2024-05-21

## 2023-11-16 RX ORDER — FENOFIBRATE 145 MG/1
145 TABLET, COATED ORAL DAILY
Qty: 90 TABLET | Refills: 4 | Status: SHIPPED | OUTPATIENT
Start: 2023-11-16 | End: 2024-05-21

## 2023-11-16 RX ORDER — LISDEXAMFETAMINE DIMESYLATE 30 MG/1
30 CAPSULE ORAL EVERY MORNING
Qty: 30 CAPSULE | Refills: 0 | Status: SHIPPED | OUTPATIENT
Start: 2024-01-11 | End: 2024-02-19

## 2023-11-16 RX ORDER — OMEGA-3-ACID ETHYL ESTERS 1 G/1
1-2 CAPSULE, LIQUID FILLED ORAL 4 TIMES DAILY
Qty: 400 CAPSULE | Refills: 4 | Status: SHIPPED | OUTPATIENT
Start: 2023-11-16 | End: 2024-05-21

## 2023-11-16 RX ORDER — ROSUVASTATIN CALCIUM 40 MG/1
40 TABLET, COATED ORAL DAILY
Qty: 90 TABLET | Refills: 4 | Status: SHIPPED | OUTPATIENT
Start: 2023-11-16 | End: 2024-05-21

## 2023-11-16 RX ORDER — LISDEXAMFETAMINE DIMESYLATE 30 MG/1
30 CAPSULE ORAL EVERY MORNING
Qty: 30 CAPSULE | Refills: 0 | Status: SHIPPED | OUTPATIENT
Start: 2023-12-14 | End: 2024-02-19

## 2023-11-16 RX ORDER — ESCITALOPRAM OXALATE 10 MG/1
10 TABLET ORAL DAILY
Qty: 90 TABLET | Refills: 4 | Status: SHIPPED | OUTPATIENT
Start: 2023-11-16 | End: 2024-05-21

## 2023-11-16 ASSESSMENT — ENCOUNTER SYMPTOMS
ABDOMINAL PAIN: 0
WOUND: 0
SHORTNESS OF BREATH: 0
NERVOUS/ANXIOUS: 1
CHILLS: 0
ROS SKIN COMMENTS: HAIR LOSS
BRUISES/BLEEDS EASILY: 0
FEVER: 0
COUGH: 0
BACK PAIN: 0

## 2023-11-16 ASSESSMENT — ANXIETY QUESTIONNAIRES
5. BEING SO RESTLESS THAT IT IS HARD TO SIT STILL: NOT AT ALL
GAD7 TOTAL SCORE: 0
6. BECOMING EASILY ANNOYED OR IRRITABLE: NOT AT ALL
7. FEELING AFRAID AS IF SOMETHING AWFUL MIGHT HAPPEN: NOT AT ALL
4. TROUBLE RELAXING: NOT AT ALL
GAD7 TOTAL SCORE: 0
IF YOU CHECKED OFF ANY PROBLEMS ON THIS QUESTIONNAIRE, HOW DIFFICULT HAVE THESE PROBLEMS MADE IT FOR YOU TO DO YOUR WORK, TAKE CARE OF THINGS AT HOME, OR GET ALONG WITH OTHER PEOPLE: NOT DIFFICULT AT ALL
1. FEELING NERVOUS, ANXIOUS, OR ON EDGE: NOT AT ALL
2. NOT BEING ABLE TO STOP OR CONTROL WORRYING: NOT AT ALL
3. WORRYING TOO MUCH ABOUT DIFFERENT THINGS: NOT AT ALL

## 2023-11-16 ASSESSMENT — PAIN SCALES - GENERAL: PAINLEVEL: MILD PAIN (2)

## 2023-11-16 NOTE — PROGRESS NOTES
Assessment & Plan     ICD-10-CM    1. Benign essential hypertension  I10 Comprehensive Metabolic Panel     CBC and Differential     amLODIPine (NORVASC) 5 MG tablet     losartan (COZAAR) 100 MG tablet     Comprehensive Metabolic Panel     CBC and Differential      2. Familial hypercholesterolemia - at least heterozygote  E78.01 Lipid Panel     omega-3 acid ethyl esters (LOVAZA) 1 g capsule     rosuvastatin (CRESTOR) 40 MG tablet     Lipid Panel     Lipid Panel     CANCELED: Lipid Panel      3. Hypertriglyceridemia  E78.1 Lipid Panel     fenofibrate (TRICOR) 145 MG tablet     Lipid Panel     Lipid Panel     CANCELED: Lipid Panel      4. Recurrent major depressive disorder, in full remission (H24)  F33.42 amitriptyline (ELAVIL) 25 MG tablet     escitalopram (LEXAPRO) 10 MG tablet      5. Anxiety  F41.9 LORazepam (ATIVAN) 0.5 MG tablet      6. Allergic rhinitis due to cat hair  J30.81 budesonide (RINOCORT AQUA) 32 MCG/ACT nasal spray      7. Hair loss  L65.9 finasteride (PROPECIA) 1 MG tablet      8. Attention deficit hyperactivity disorder (ADHD), combined type  F90.2 lisdexamfetamine (VYVANSE) 30 MG capsule     lisdexamfetamine (VYVANSE) 30 MG capsule     lisdexamfetamine (VYVANSE) 30 MG capsule     LORazepam (ATIVAN) 0.5 MG tablet      9. Other long term (current) drug therapy - Adderall and Ativan  Z79.899 lisdexamfetamine (VYVANSE) 30 MG capsule     lisdexamfetamine (VYVANSE) 30 MG capsule     lisdexamfetamine (VYVANSE) 30 MG capsule     LORazepam (ATIVAN) 0.5 MG tablet      10. Inflammatory bowel disease -- not definitive of UC vs chrons based on blood work in 2006  K52.9 mesalamine (DELZICOL) 400 MG DR capsule      11. Heartburn  R12 omeprazole (PRILOSEC) 20 MG DR capsule      12. Erectile dysfunction, unspecified erectile dysfunction type  N52.9 sildenafil (VIAGRA) 100 MG tablet      13. Chronic conjunctivitis of both eyes, unspecified chronic conjunctivitis type  H10.403 sulfacetamide (BLEPH-10) 10 %  ophthalmic solution      14. Migraine without aura and without status migrainosus, not intractable  G43.009 SUMAtriptan (IMITREX) 50 MG tablet      15. Elevated random blood glucose level  R73.09 Hemoglobin A1c     Hemoglobin A1c        Patient presents for follow-up multiple issues.    Chronic anxiety, still using lorazepam regularly.  Has found Lexapro helpful as well.  Takes amitriptyline at bedtime to help with depression which is currently in remission.  Doing well with current dosing.  Needs refills.    Allergic rhinitis, still periodically using budesonide nasal spray.  Needs refills.    Hair loss, chronic but has noted improvement with daily dosing of finasteride.  Tolerating well.  Needs refills.    ADHD, combined type.  Working full-time.  Doing well with Vyvanse.  No medication side effects reported.  Tolerating well.  Has been able to keep a full-time job.  Reports improvement in attention deficit symptoms.  Needs refills.    Inflammatory bowel disease, chronic.  Still taking mesalamine regularly.  Needs refills.    Heartburn, chronic.  Has noted improvement with omeprazole.  Needs to take regularly.  Gets recurrent symptoms if missing a dose.  Needs refills.    Erectile dysfunction, chronic.  Ongoing.  Using Viagra as needed.  Tolerating well.  Needs refills.    Chronic conjunctivitis, bilateral eyes.  Using Bleph-10 ophthalmic solution periodically.  Needs refills.    Migraine headaches, intermittent.  Occasional use of Imitrex.  Tolerating well.  Needs refills.      Elevated random glucose, check hemoglobin A1c    HYPERTENSION - Ongoing. Blood pressure is currently well controlled.  Medication side effects: None. Denies syncope or presyncope.  No changes for now. Continue current medications.   Medication list reviewed/updated. Refills completed as needed.      Elevated triglycerides with familial HYPERLIPIDEMIA.  Ongoing. LDL is at goal: No. Triglycerides are at goal: No.  Hopefully lifestyle  modifications will improve cholesterol levels, otherwise we will need to consider additional medication dose adjustments or medication changes.  Medication side effects reported: None.   Continue current medications for now -fenofibrate, Crestor, omega-3 fatty acids/fish oil/flaxseed oil. Medication list reviewed/updated. Refills completed as needed.  Recent Labs   Lab Test 11/16/23  1601 07/06/22  1022   CHOL 247* 182   HDL 55 64   * 76   TRIG 214* 209*     DEPRESSION - Patient has a long history of depression of moderate severity requiring medication for control.  Recent symptoms include: anxiety.   Depression course: completely resolved.      Vaccine counseling completed.  Encouraged routine / annual vaccinations.     Return in about 3 months (around 2/16/2024) for - Med Refills - Controlled Medications.    Jarad Salcido MD  [facility]   Amyte is a 43 year old, presenting for the following health issues:  Med Check, need refills also        11/16/2023     2:49 PM   Additional Questions   Roomed by Yogi OLSON / MARIA E   Accompanied by n/a       History of Present Illness       Mental Health Follow-up:  Patient presents to follow-up on Depression & Anxiety.Patient's depression since last visit has been:  No change  The patient is not having other symptoms associated with depression.  Patient's anxiety since last visit has been:  No change  The patient is not having other symptoms associated with anxiety.  Any significant life events: relationship concerns  Patient is not feeling anxious or having panic attacks.  Patient has no concerns about alcohol or drug use.    Hyperlipidemia:  He presents for follow up of hyperlipidemia.   He is taking medication to lower cholesterol. He is not having myalgia or other side effects to statin medications.    Hypertension: He presents for follow up of hypertension.  He does check blood pressure  regularly outside of the clinic. Outpatient  "blood pressures have not been over 140/90. He does not follow a low salt diet.     He eats 2-3 servings of fruits and vegetables daily.He consumes 1 sweetened beverage(s) daily.He exercises with enough effort to increase his heart rate 60 or more minutes per day.  He exercises with enough effort to increase his heart rate 6 days per week.   He is taking medications regularly.     Review of Systems   Constitutional:  Negative for chills and fever.   HENT:  Negative for congestion.    Eyes:         Intermittent corneal / eye irritation / inflammation   Respiratory:  Negative for cough and shortness of breath.    Cardiovascular:  Negative for chest pain.   Gastrointestinal:  Negative for abdominal pain.   Genitourinary:  Positive for impotence (intermittent).   Musculoskeletal:  Negative for back pain.   Skin:  Negative for rash and wound.        Hair loss   Allergic/Immunologic: Positive for environmental allergies. Negative for immunocompromised state.   Neurological:  Negative for syncope.   Hematological:  Does not bruise/bleed easily.   Psychiatric/Behavioral:  The patient is nervous/anxious (improved with intermittent ativan).         ADHD - much better with adderall. Able to work full time.   All other systems reviewed and are negative.           Objective    /66 (BP Location: Right arm, Patient Position: Sitting, Cuff Size: Adult Regular)   Pulse 95   Temp 97.1  F (36.2  C) (Temporal)   Resp 18   Ht 1.784 m (5' 10.25\")   Wt 88.8 kg (195 lb 12.8 oz)   SpO2 96%   BMI 27.89 kg/m    Body mass index is 27.89 kg/m .  Physical Exam  Constitutional:       General: He is not in acute distress.     Appearance: He is well-developed. He is not diaphoretic.   HENT:      Head: Normocephalic and atraumatic.   Eyes:      General: No scleral icterus.     Conjunctiva/sclera: Conjunctivae normal.   Cardiovascular:      Rate and Rhythm: Normal rate and regular rhythm.      Pulses: Normal pulses.   Pulmonary:      " Effort: Pulmonary effort is normal.      Breath sounds: Normal breath sounds.   Abdominal:      Palpations: Abdomen is soft.      Tenderness: There is no abdominal tenderness.   Musculoskeletal:         General: No deformity.      Cervical back: Neck supple.      Right lower leg: No edema.      Left lower leg: No edema.   Lymphadenopathy:      Cervical: No cervical adenopathy.   Skin:     General: Skin is warm and dry.      Findings: No rash.   Neurological:      Mental Status: He is alert. Mental status is at baseline.   Psychiatric:         Mood and Affect: Mood normal.         Behavior: Behavior normal.

## 2023-11-16 NOTE — NURSING NOTE
"Chief Complaint   Patient presents with    Med Check, need refills also       Initial /66 (BP Location: Right arm, Patient Position: Sitting, Cuff Size: Adult Regular)   Pulse 95   Temp 97.1  F (36.2  C) (Temporal)   Resp 18   Ht 1.784 m (5' 10.25\")   Wt 88.8 kg (195 lb 12.8 oz)   SpO2 96%   BMI 27.89 kg/m   Estimated body mass index is 27.89 kg/m  as calculated from the following:    Height as of this encounter: 1.784 m (5' 10.25\").    Weight as of this encounter: 88.8 kg (195 lb 12.8 oz).  Medication Review: complete    The next two questions are to help us understand your food security.  If you are feeling you need any assistance in this area, we have resources available to support you today.          11/16/2023   SDOH- Food Insecurity   Within the past 12 months, did you worry that your food would run out before you got money to buy more? N   Within the past 12 months, did the food you bought just not last and you didn t have money to get more? N         Health Care Directive:  Patient does not have a Health Care Directive or Living Will: Discussed advance care planning with patient; however, patient declined at this time.    Yogi Jefferson      "

## 2023-11-16 NOTE — LETTER
November 19, 2023      Mayte Hess  [address] [place] [address]        Dear ,    We are writing to inform you of your test results.    Cholesterol levels remain elevated.    Hemoglobin A1c is 1/10 of a point into prediabetes.     -- Pre-diabetes:  fasting glucose: 100 - 125  hemoglobin A1c: 5.7 - 6.4   -- Diabetes:  fasting glucose greater than 125  random glucose greater than 200  hemoglobin A1c: > or = 6.5     To help with weight loss and improve blood sugar control....    -- Try to avoid Carbohydrates as much as possible -- breads, pasta, baked goods, cakes, oatmeal, cold cereal, potatoes.   -- Eat more lean meats, proteins, eggs, nuts, vegetables.    -- Start or Continue regular daily exercise.     Get out and exercise, bike ride, walk for 10 to 15 minutes after each meal -- this can significantly lowers the spike in blood sugar after eating.       Increase dosing of omega-3 as tolerated.  Otherwise, continue current medications.       Electronically signed by:  Jarad Salcido MD  on November 19, 2023     Resulted Orders   Lipid Panel   Result Value Ref Range    Cholesterol 247 (H) <200 mg/dL    Triglycerides 214 (H) <150 mg/dL    Direct Measure HDL 55 >=40 mg/dL    LDL Cholesterol Calculated 149 (H) <=100 mg/dL    Non HDL Cholesterol 192 (H) <130 mg/dL    Narrative    Cholesterol  Desirable:  <200 mg/dL    Triglycerides  Normal:  Less than 150 mg/dL  Borderline High:  150-199 mg/dL  High:  200-499 mg/dL  Very High:  Greater than or equal to 500 mg/dL    Direct Measure HDL  Female:  Greater than or equal to 50 mg/dL   Male:  Greater than or equal to 40 mg/dL    LDL Cholesterol  Desirable:  <100mg/dL  Above Desirable:  100-129 mg/dL   Borderline High:  130-159 mg/dL   High:  160-189 mg/dL   Very High:  >= 190 mg/dL    Non HDL Cholesterol  Desirable:  130 mg/dL  Above Desirable:  130-159 mg/dL  Borderline High:  160-189 mg/dL  High:  190-219 mg/dL  Very High:  Greater than or equal  to 220 mg/dL   Hemoglobin A1c   Result Value Ref Range    Hemoglobin A1C 5.7 4.0 - 6.2 %   Comprehensive Metabolic Panel   Result Value Ref Range    Sodium 138 135 - 145 mmol/L      Comment:      Reference intervals for this test were updated on 09/26/2023 to more accurately reflect our healthy population. There may be differences in the flagging of prior results with similar values performed with this method. Interpretation of those prior results can be made in the context of the updated reference intervals.     Potassium 4.6 3.4 - 5.3 mmol/L    Carbon Dioxide (CO2) 22 22 - 29 mmol/L    Anion Gap 12 7 - 15 mmol/L    Urea Nitrogen 13.7 6.0 - 20.0 mg/dL    Creatinine 0.79 0.67 - 1.17 mg/dL    GFR Estimate >90 >60 mL/min/1.73m2    Calcium 10.4 (H) 8.6 - 10.0 mg/dL    Chloride 104 98 - 107 mmol/L    Glucose 109 (H) 70 - 99 mg/dL    Alkaline Phosphatase 70 40 - 150 U/L      Comment:      Reference intervals for this test were updated on 11/14/2023 to more accurately reflect our healthy population. There may be differences in the flagging of prior results with similar values performed with this method. Interpretation of those prior results can be made in the context of the updated reference intervals.    AST 40 0 - 45 U/L      Comment:      Reference intervals for this test were updated on 6/12/2023 to more accurately reflect our healthy population. There may be differences in the flagging of prior results with similar values performed with this method. Interpretation of those prior results can be made in the context of the updated reference intervals.    ALT 59 0 - 70 U/L      Comment:      Reference intervals for this test were updated on 6/12/2023 to more accurately reflect our healthy population. There may be differences in the flagging of prior results with similar values performed with this method. Interpretation of those prior results can be made in the context of the updated reference intervals.      Protein  Total 7.8 6.4 - 8.3 g/dL    Albumin 4.7 3.5 - 5.2 g/dL    Bilirubin Total 0.2 <=1.2 mg/dL   CBC with platelets and differential   Result Value Ref Range    WBC Count 7.1 4.0 - 11.0 10e3/uL    RBC Count 5.01 4.40 - 5.90 10e6/uL    Hemoglobin 16.4 13.3 - 17.7 g/dL    Hematocrit 48.8 40.0 - 53.0 %    MCV 97 78 - 100 fL    MCH 32.7 26.5 - 33.0 pg    MCHC 33.6 31.5 - 36.5 g/dL    RDW 11.7 10.0 - 15.0 %    Platelet Count 159 150 - 450 10e3/uL    % Neutrophils 58 %    % Lymphocytes 29 %    % Monocytes 9 %    % Eosinophils 2 %    % Basophils 1 %    % Immature Granulocytes 1 %    NRBCs per 100 WBC 0 <1 /100    Absolute Neutrophils 4.0 1.6 - 8.3 10e3/uL    Absolute Lymphocytes 2.1 0.8 - 5.3 10e3/uL    Absolute Monocytes 0.7 0.0 - 1.3 10e3/uL    Absolute Eosinophils 0.2 0.0 - 0.7 10e3/uL    Absolute Basophils 0.1 0.0 - 0.2 10e3/uL    Absolute Immature Granulocytes 0.1 <=0.4 10e3/uL    Absolute NRBCs 0.0 10e3/uL       If you have any questions or concerns, please call the clinic at the number listed above.       Sincerely,      Jarad Salcido MD

## 2024-02-08 ENCOUNTER — TELEPHONE (OUTPATIENT)
Dept: INTERNAL MEDICINE | Facility: OTHER | Age: 44
End: 2024-02-08

## 2024-02-08 DIAGNOSIS — I10 BENIGN ESSENTIAL HYPERTENSION: Primary | ICD-10-CM

## 2024-02-20 ENCOUNTER — OFFICE VISIT (OUTPATIENT)
Dept: INTERNAL MEDICINE | Facility: OTHER | Age: 44
End: 2024-02-20
Attending: INTERNAL MEDICINE

## 2024-02-20 VITALS
DIASTOLIC BLOOD PRESSURE: 83 MMHG | BODY MASS INDEX: 29.95 KG/M2 | HEIGHT: 70 IN | SYSTOLIC BLOOD PRESSURE: 138 MMHG | OXYGEN SATURATION: 95 % | RESPIRATION RATE: 18 BRPM | TEMPERATURE: 97.1 F | HEART RATE: 114 BPM | WEIGHT: 209.2 LBS

## 2024-02-20 DIAGNOSIS — E78.1 HYPERTRIGLYCERIDEMIA: ICD-10-CM

## 2024-02-20 DIAGNOSIS — F41.9 ANXIETY: ICD-10-CM

## 2024-02-20 DIAGNOSIS — E83.52 HIGH CALCIUM LEVELS: ICD-10-CM

## 2024-02-20 DIAGNOSIS — E66.09 CLASS 1 OBESITY DUE TO EXCESS CALORIES WITH SERIOUS COMORBIDITY AND BODY MASS INDEX (BMI) OF 30.0 TO 30.9 IN ADULT: ICD-10-CM

## 2024-02-20 DIAGNOSIS — E55.9 VITAMIN D DEFICIENCY: ICD-10-CM

## 2024-02-20 DIAGNOSIS — E66.811 CLASS 1 OBESITY DUE TO EXCESS CALORIES WITH SERIOUS COMORBIDITY AND BODY MASS INDEX (BMI) OF 30.0 TO 30.9 IN ADULT: ICD-10-CM

## 2024-02-20 DIAGNOSIS — F90.2 ATTENTION DEFICIT HYPERACTIVITY DISORDER (ADHD), COMBINED TYPE: Primary | ICD-10-CM

## 2024-02-20 DIAGNOSIS — E53.8 VITAMIN B12 DEFICIENCY: ICD-10-CM

## 2024-02-20 DIAGNOSIS — Z79.899 OTHER LONG TERM (CURRENT) DRUG THERAPY: ICD-10-CM

## 2024-02-20 DIAGNOSIS — R53.83 MALAISE AND FATIGUE: ICD-10-CM

## 2024-02-20 DIAGNOSIS — R53.81 MALAISE AND FATIGUE: ICD-10-CM

## 2024-02-20 LAB
ALBUMIN SERPL BCG-MCNC: 4.8 G/DL (ref 3.5–5.2)
ALP SERPL-CCNC: 75 U/L (ref 40–150)
ALT SERPL W P-5'-P-CCNC: 40 U/L (ref 0–70)
ANION GAP SERPL CALCULATED.3IONS-SCNC: 15 MMOL/L (ref 7–15)
AST SERPL W P-5'-P-CCNC: 28 U/L (ref 0–45)
BASOPHILS # BLD AUTO: 0.1 10E3/UL (ref 0–0.2)
BASOPHILS NFR BLD AUTO: 1 %
BILIRUB SERPL-MCNC: 0.4 MG/DL
BUN SERPL-MCNC: 14.5 MG/DL (ref 6–20)
CALCIUM SERPL-MCNC: 10.5 MG/DL (ref 8.6–10)
CHLORIDE SERPL-SCNC: 101 MMOL/L (ref 98–107)
CHOLEST SERPL-MCNC: 232 MG/DL
CREAT SERPL-MCNC: 0.93 MG/DL (ref 0.67–1.17)
DEPRECATED HCO3 PLAS-SCNC: 20 MMOL/L (ref 22–29)
EGFRCR SERPLBLD CKD-EPI 2021: >90 ML/MIN/1.73M2
EOSINOPHIL # BLD AUTO: 0.2 10E3/UL (ref 0–0.7)
EOSINOPHIL NFR BLD AUTO: 3 %
ERYTHROCYTE [DISTWIDTH] IN BLOOD BY AUTOMATED COUNT: 12.6 % (ref 10–15)
FASTING STATUS PATIENT QL REPORTED: ABNORMAL
GLUCOSE SERPL-MCNC: 121 MG/DL (ref 70–99)
HCT VFR BLD AUTO: 49.2 % (ref 40–53)
HDLC SERPL-MCNC: 37 MG/DL
HGB BLD-MCNC: 16.7 G/DL (ref 13.3–17.7)
IMM GRANULOCYTES # BLD: 0 10E3/UL
IMM GRANULOCYTES NFR BLD: 1 %
LDLC SERPL CALC-MCNC: 139 MG/DL
LYMPHOCYTES # BLD AUTO: 2.6 10E3/UL (ref 0.8–5.3)
LYMPHOCYTES NFR BLD AUTO: 39 %
MCH RBC QN AUTO: 30.9 PG (ref 26.5–33)
MCHC RBC AUTO-ENTMCNC: 33.9 G/DL (ref 31.5–36.5)
MCV RBC AUTO: 91 FL (ref 78–100)
MONOCYTES # BLD AUTO: 0.7 10E3/UL (ref 0–1.3)
MONOCYTES NFR BLD AUTO: 11 %
NEUTROPHILS # BLD AUTO: 3.1 10E3/UL (ref 1.6–8.3)
NEUTROPHILS NFR BLD AUTO: 45 %
NONHDLC SERPL-MCNC: 195 MG/DL
NRBC # BLD AUTO: 0 10E3/UL
NRBC BLD AUTO-RTO: 0 /100
PLATELET # BLD AUTO: 209 10E3/UL (ref 150–450)
POTASSIUM SERPL-SCNC: 4 MMOL/L (ref 3.4–5.3)
PROT SERPL-MCNC: 8.3 G/DL (ref 6.4–8.3)
RBC # BLD AUTO: 5.41 10E6/UL (ref 4.4–5.9)
SODIUM SERPL-SCNC: 136 MMOL/L (ref 135–145)
TRIGL SERPL-MCNC: 279 MG/DL
TSH SERPL DL<=0.005 MIU/L-ACNC: 1.59 UIU/ML (ref 0.3–4.2)
VIT B12 SERPL-MCNC: 493 PG/ML (ref 232–1245)
VIT D+METAB SERPL-MCNC: 31 NG/ML (ref 20–50)
WBC # BLD AUTO: 6.6 10E3/UL (ref 4–11)

## 2024-02-20 PROCEDURE — 82040 ASSAY OF SERUM ALBUMIN: CPT | Mod: ZL | Performed by: INTERNAL MEDICINE

## 2024-02-20 PROCEDURE — 36415 COLL VENOUS BLD VENIPUNCTURE: CPT | Mod: ZL | Performed by: INTERNAL MEDICINE

## 2024-02-20 PROCEDURE — 80061 LIPID PANEL: CPT | Mod: ZL | Performed by: INTERNAL MEDICINE

## 2024-02-20 PROCEDURE — 84443 ASSAY THYROID STIM HORMONE: CPT | Mod: ZL | Performed by: INTERNAL MEDICINE

## 2024-02-20 PROCEDURE — 82607 VITAMIN B-12: CPT | Mod: ZL | Performed by: INTERNAL MEDICINE

## 2024-02-20 PROCEDURE — 85025 COMPLETE CBC W/AUTO DIFF WBC: CPT | Mod: ZL | Performed by: INTERNAL MEDICINE

## 2024-02-20 PROCEDURE — 82306 VITAMIN D 25 HYDROXY: CPT | Mod: ZL | Performed by: INTERNAL MEDICINE

## 2024-02-20 PROCEDURE — 99214 OFFICE O/P EST MOD 30 MIN: CPT | Performed by: INTERNAL MEDICINE

## 2024-02-20 RX ORDER — LISDEXAMFETAMINE DIMESYLATE 30 MG/1
30 CAPSULE ORAL EVERY MORNING
Qty: 30 CAPSULE | Refills: 0 | Status: SHIPPED | OUTPATIENT
Start: 2024-02-20 | End: 2024-05-21

## 2024-02-20 RX ORDER — LORAZEPAM 0.5 MG/1
0.5 TABLET ORAL 2 TIMES DAILY PRN
Qty: 60 TABLET | Refills: 2 | Status: SHIPPED | OUTPATIENT
Start: 2024-02-20 | End: 2024-05-21

## 2024-02-20 RX ORDER — DIMENHYDRINATE 50 MG
4-6 TABLET ORAL DAILY
Qty: 400 CAPSULE | Refills: 4 | Status: SHIPPED | OUTPATIENT
Start: 2024-02-20 | End: 2024-05-21

## 2024-02-20 RX ORDER — LISDEXAMFETAMINE DIMESYLATE 30 MG/1
30 CAPSULE ORAL EVERY MORNING
Qty: 30 CAPSULE | Refills: 0 | Status: SHIPPED | OUTPATIENT
Start: 2024-04-16 | End: 2024-05-21

## 2024-02-20 RX ORDER — LISDEXAMFETAMINE DIMESYLATE 30 MG/1
30 CAPSULE ORAL EVERY MORNING
Qty: 30 CAPSULE | Refills: 0 | Status: SHIPPED | OUTPATIENT
Start: 2024-03-19 | End: 2024-05-21

## 2024-02-20 ASSESSMENT — ENCOUNTER SYMPTOMS
NERVOUS/ANXIOUS: 1
WOUND: 0
FEVER: 0
BRUISES/BLEEDS EASILY: 0
ROS SKIN COMMENTS: HAIR LOSS
SHORTNESS OF BREATH: 0
FATIGUE: 1
COUGH: 0
CHILLS: 0
ABDOMINAL PAIN: 0
BACK PAIN: 0

## 2024-02-20 ASSESSMENT — ANXIETY QUESTIONNAIRES
7. FEELING AFRAID AS IF SOMETHING AWFUL MIGHT HAPPEN: NOT AT ALL
GAD7 TOTAL SCORE: 0
GAD7 TOTAL SCORE: 0
IF YOU CHECKED OFF ANY PROBLEMS ON THIS QUESTIONNAIRE, HOW DIFFICULT HAVE THESE PROBLEMS MADE IT FOR YOU TO DO YOUR WORK, TAKE CARE OF THINGS AT HOME, OR GET ALONG WITH OTHER PEOPLE: NOT DIFFICULT AT ALL
GAD7 TOTAL SCORE: 0
1. FEELING NERVOUS, ANXIOUS, OR ON EDGE: NOT AT ALL
6. BECOMING EASILY ANNOYED OR IRRITABLE: NOT AT ALL
3. WORRYING TOO MUCH ABOUT DIFFERENT THINGS: NOT AT ALL
8. IF YOU CHECKED OFF ANY PROBLEMS, HOW DIFFICULT HAVE THESE MADE IT FOR YOU TO DO YOUR WORK, TAKE CARE OF THINGS AT HOME, OR GET ALONG WITH OTHER PEOPLE?: NOT DIFFICULT AT ALL
7. FEELING AFRAID AS IF SOMETHING AWFUL MIGHT HAPPEN: NOT AT ALL
4. TROUBLE RELAXING: NOT AT ALL
2. NOT BEING ABLE TO STOP OR CONTROL WORRYING: NOT AT ALL
5. BEING SO RESTLESS THAT IT IS HARD TO SIT STILL: NOT AT ALL

## 2024-02-20 ASSESSMENT — PAIN SCALES - GENERAL: PAINLEVEL: NO PAIN (0)

## 2024-02-20 NOTE — PROGRESS NOTES
Assessment & Plan   Problem List Items Addressed This Visit          Digestive    Class 1 obesity due to excess calories with serious comorbidity and body mass index (BMI) of 30.0 to 30.9 in adult    Relevant Medications    lisdexamfetamine (VYVANSE) 30 MG capsule    lisdexamfetamine (VYVANSE) 30 MG capsule (Start on 3/19/2024)    lisdexamfetamine (VYVANSE) 30 MG capsule (Start on 4/16/2024)    Vitamin B12 deficiency    Relevant Orders    Vitamin B12 (Completed)    Vitamin D deficiency    Relevant Orders    Vitamin D Deficiency       Endocrine    Hypertriglyceridemia    Relevant Medications    Flaxseed, Linseed, (FLAX SEED OIL) 1000 MG capsule    Other Relevant Orders    Lipid Profile (Completed)    High calcium levels       Behavioral    Attention deficit hyperactivity disorder (ADHD), combined type - Primary    Relevant Medications    lisdexamfetamine (VYVANSE) 30 MG capsule    lisdexamfetamine (VYVANSE) 30 MG capsule (Start on 3/19/2024)    lisdexamfetamine (VYVANSE) 30 MG capsule (Start on 4/16/2024)    LORazepam (ATIVAN) 0.5 MG tablet       Other    Anxiety    Relevant Medications    LORazepam (ATIVAN) 0.5 MG tablet    Other long term (current) drug therapy - Adderall and Ativan    Relevant Medications    lisdexamfetamine (VYVANSE) 30 MG capsule    lisdexamfetamine (VYVANSE) 30 MG capsule (Start on 3/19/2024)    lisdexamfetamine (VYVANSE) 30 MG capsule (Start on 4/16/2024)    LORazepam (ATIVAN) 0.5 MG tablet     Other Visit Diagnoses       Malaise and fatigue        Relevant Orders    CBC with Platelets & Differential (Completed)    Comprehensive metabolic panel (Completed)    TSH with free T4 reflex (Completed)           Patient presents for follow-up multiple issues.    Vitamin B12 deficiency.  States that he has been taking replacement.  B12 levels are a bit low today at 493.  Recommend increase dose.    Chronic anxiety, ongoing.  Using lorazepam regularly.  Needs refills.  Seems to be tolerating  "well.    Elevated triglyceride levels with MIXED HYPERLIPIDEMIA.   Has not been taking full dose of omega-3.  Consider switching over to flaxseed oil/capsules.    Ongoing. LDL is at goal: No. Triglycerides are at goal: No.  Hopefully lifestyle modifications will improve cholesterol levels, otherwise will consider additional medication dose adjustments or medication changes.  Medication side effects reported: None.   Continue current medications for now + increase fish oil/flaxseed oil supplement. Medication list reviewed/updated. Refills completed as needed.      OBESITY - Ongoing.  (See Encounter Diagnosis list above for obesity class / severity).  - Encourage continued maintenance / improvement in diet and exercise.   - Consider Nutrition / Dietician appointment.  - Weight loss would improve Cholesterol.      Fatigue and malaise, worsening recently.  Check lab work.    High calcium level, noted with previous lab work.  Recheck today.           BMI  Estimated body mass index is 30.02 kg/m  as calculated from the following:    Height as of this encounter: 1.778 m (5' 10\").    Weight as of this encounter: 94.9 kg (209 lb 3.2 oz).       Results for orders placed or performed in visit on 02/20/24   Vitamin B12     Status: Normal   Result Value Ref Range    Vitamin B12 493 232 - 1,245 pg/mL   TSH with free T4 reflex     Status: Normal   Result Value Ref Range    TSH 1.59 0.30 - 4.20 uIU/mL   Lipid Profile     Status: Abnormal   Result Value Ref Range    Cholesterol 232 (H) <200 mg/dL    Triglycerides 279 (H) <150 mg/dL    Direct Measure HDL 37 (L) >=40 mg/dL    LDL Cholesterol Calculated 139 (H) <=100 mg/dL    Non HDL Cholesterol 195 (H) <130 mg/dL    Patient Fasting > 8hrs? Unknown     Narrative    Cholesterol  Desirable:  <200 mg/dL    Triglycerides  Normal:  Less than 150 mg/dL  Borderline High:  150-199 mg/dL  High:  200-499 mg/dL  Very High:  Greater than or equal to 500 mg/dL    Direct Measure HDL  Female:  " Greater than or equal to 50 mg/dL   Male:  Greater than or equal to 40 mg/dL    LDL Cholesterol  Desirable:  <100mg/dL  Above Desirable:  100-129 mg/dL   Borderline High:  130-159 mg/dL   High:  160-189 mg/dL   Very High:  >= 190 mg/dL    Non HDL Cholesterol  Desirable:  130 mg/dL  Above Desirable:  130-159 mg/dL  Borderline High:  160-189 mg/dL  High:  190-219 mg/dL  Very High:  Greater than or equal to 220 mg/dL   Comprehensive metabolic panel     Status: Abnormal   Result Value Ref Range    Sodium 136 135 - 145 mmol/L    Potassium 4.0 3.4 - 5.3 mmol/L    Carbon Dioxide (CO2) 20 (L) 22 - 29 mmol/L    Anion Gap 15 7 - 15 mmol/L    Urea Nitrogen 14.5 6.0 - 20.0 mg/dL    Creatinine 0.93 0.67 - 1.17 mg/dL    GFR Estimate >90 >60 mL/min/1.73m2    Calcium 10.5 (H) 8.6 - 10.0 mg/dL    Chloride 101 98 - 107 mmol/L    Glucose 121 (H) 70 - 99 mg/dL    Alkaline Phosphatase 75 40 - 150 U/L    AST 28 0 - 45 U/L    ALT 40 0 - 70 U/L    Protein Total 8.3 6.4 - 8.3 g/dL    Albumin 4.8 3.5 - 5.2 g/dL    Bilirubin Total 0.4 <=1.2 mg/dL   CBC with platelets and differential     Status: None   Result Value Ref Range    WBC Count 6.6 4.0 - 11.0 10e3/uL    RBC Count 5.41 4.40 - 5.90 10e6/uL    Hemoglobin 16.7 13.3 - 17.7 g/dL    Hematocrit 49.2 40.0 - 53.0 %    MCV 91 78 - 100 fL    MCH 30.9 26.5 - 33.0 pg    MCHC 33.9 31.5 - 36.5 g/dL    RDW 12.6 10.0 - 15.0 %    Platelet Count 209 150 - 450 10e3/uL    % Neutrophils 45 %    % Lymphocytes 39 %    % Monocytes 11 %    % Eosinophils 3 %    % Basophils 1 %    % Immature Granulocytes 1 %    NRBCs per 100 WBC 0 <1 /100    Absolute Neutrophils 3.1 1.6 - 8.3 10e3/uL    Absolute Lymphocytes 2.6 0.8 - 5.3 10e3/uL    Absolute Monocytes 0.7 0.0 - 1.3 10e3/uL    Absolute Eosinophils 0.2 0.0 - 0.7 10e3/uL    Absolute Basophils 0.1 0.0 - 0.2 10e3/uL    Absolute Immature Granulocytes 0.0 <=0.4 10e3/uL    Absolute NRBCs 0.0 10e3/uL   CBC with Platelets & Differential     Status: None    Narrative     The following orders were created for panel order CBC with Platelets & Differential.  Procedure                               Abnormality         Status                     ---------                               -----------         ------                     CBC with platelets and d...[[id number]]                      Final result                 Please view results for these tests on the individual orders.      CBC is normal.  Chemistry shows slightly low CO2 level, slightly elevated calcium, slightly elevated glucose.  Liver enzymes normal.  Cholesterol levels are high and not at goal.  TSH normal.  Vitamin B12 is a bit low.    Return in about 12 weeks (around 5/14/2024).      Jarad Salcido MD  [facility] AND \[facility] [place] [facility]\""    Review of Systems   Constitutional:  Positive for fatigue. Negative for chills and fever.   HENT:  Negative for congestion.    Eyes:         Intermittent corneal / eye irritation / inflammation   Respiratory:  Negative for cough and shortness of breath.    Cardiovascular:  Negative for chest pain.   Gastrointestinal:  Negative for abdominal pain.   Genitourinary:  Positive for impotence (intermittent).   Musculoskeletal:  Negative for back pain.   Skin:  Negative for rash and wound.        Hair loss   Allergic/Immunologic: Positive for environmental allergies. Negative for immunocompromised state.   Neurological:  Negative for syncope.   Hematological:  Does not bruise/bleed easily.   Psychiatric/Behavioral:  The patient is nervous/anxious (improved with intermittent ativan).         ADHD - much better with adderall. Able to work full time.   All other systems reviewed and are negative.       Natty Hu is a 43 year old, presenting for the following health issues:  Recheck Medication (Vyvanse)        2/20/2024    10:28 AM   Additional Questions   Roomed by CAROLINE Chin     History of Present Illness       Mental Health Follow-up:  Patient presents to follow-up on Depression &  "Anxiety.Patient's depression since last visit has been:  No change  The patient is not having other symptoms associated with depression.  Patient's anxiety since last visit has been:  Good  The patient is not having other symptoms associated with anxiety.  Any significant life events: No  Patient is not feeling anxious or having panic attacks.  Patient has no concerns about alcohol or drug use.    Hyperlipidemia:  He presents for follow up of hyperlipidemia.   He is taking medication to lower cholesterol. He is not having myalgia or other side effects to statin medications.    Hypertension: He presents for follow up of hypertension.  He does check blood pressure  regularly outside of the clinic. Outpatient blood pressures have not been over 140/90. He does not follow a low salt diet.     He eats 2-3 servings of fruits and vegetables daily.He consumes 1 sweetened beverage(s) daily.He exercises with enough effort to increase his heart rate 60 or more minutes per day.  He exercises with enough effort to increase his heart rate 6 days per week. He is missing 1 dose(s) of medications per week.                     Objective    /83 (BP Location: Right arm, Patient Position: Sitting, Cuff Size: Adult Large)   Pulse 114   Temp 97.1  F (36.2  C) (Temporal)   Resp 18   Ht 1.778 m (5' 10\")   Wt 94.9 kg (209 lb 3.2 oz)   SpO2 95%   BMI 30.02 kg/m    Body mass index is 30.02 kg/m .  Physical Exam  Constitutional:       General: He is not in acute distress.     Appearance: He is well-developed. He is not diaphoretic.   HENT:      Head: Normocephalic and atraumatic.   Eyes:      General: No scleral icterus.     Conjunctiva/sclera: Conjunctivae normal.   Cardiovascular:      Rate and Rhythm: Normal rate and regular rhythm.      Pulses: Normal pulses.   Pulmonary:      Effort: Pulmonary effort is normal.      Breath sounds: Normal breath sounds.   Abdominal:      Palpations: Abdomen is soft.      Tenderness: There is no " abdominal tenderness.   Musculoskeletal:         General: No deformity.      Cervical back: Neck supple.      Right lower leg: No edema.      Left lower leg: No edema.   Lymphadenopathy:      Cervical: No cervical adenopathy.   Skin:     General: Skin is warm and dry.      Findings: No rash.   Neurological:      Mental Status: He is alert. Mental status is at baseline.   Psychiatric:         Mood and Affect: Mood normal.         Behavior: Behavior normal.                    Signed Electronically by: Jarad Salcido MD

## 2024-02-20 NOTE — NURSING NOTE
"Chief Complaint   Patient presents with    Recheck Medication     Vyvanse       Initial BP (!) 142/112 (BP Location: Right arm, Patient Position: Sitting, Cuff Size: Adult Large)   Pulse 114   Temp 97.1  F (36.2  C) (Temporal)   Resp 18   Ht 1.778 m (5' 10\")   Wt 94.9 kg (209 lb 3.2 oz)   SpO2 95%   BMI 30.02 kg/m   Estimated body mass index is 30.02 kg/m  as calculated from the following:    Height as of this encounter: 1.778 m (5' 10\").    Weight as of this encounter: 94.9 kg (209 lb 3.2 oz).  Medication Review: complete    The next two questions are to help us understand your food security.  If you are feeling you need any assistance in this area, we have resources available to support you today.          2/20/2024   SDOH- Food Insecurity   Within the past 12 months, did you worry that your food would run out before you got money to buy more? N   Within the past 12 months, did the food you bought just not last and you didn t have money to get more? N           Bela Li      "

## 2024-02-20 NOTE — PATIENT INSTRUCTIONS
Blood pressure is reasonably controlled.     Medications refilled.   Labs are pending.       Return in approximately 3 month(s), or sooner as needed for follow-up with Dr. Salcido.  - annual physical + follow-up ADHD    Clinic : [phone]  Appointment line: [phone]

## 2024-02-20 NOTE — LETTER
February 20, 2024      Mayte Hess  [address] [place] [address]        Dear ,    We are writing to inform you of your test results.    CBC is normal. Chemistry shows slightly low CO2 level, slightly elevated calcium, slightly elevated glucose. Liver enzymes normal. Cholesterol levels are high and not at goal. TSH normal. Vitamin B12 is a bit low.     Consider increasing vitamin B12 supplement.    Resulted Orders   Vitamin B12   Result Value Ref Range    Vitamin B12 493 232 - 1,245 pg/mL   TSH with free T4 reflex   Result Value Ref Range    TSH 1.59 0.30 - 4.20 uIU/mL   Lipid Profile   Result Value Ref Range    Cholesterol 232 (H) <200 mg/dL    Triglycerides 279 (H) <150 mg/dL    Direct Measure HDL 37 (L) >=40 mg/dL    LDL Cholesterol Calculated 139 (H) <=100 mg/dL    Non HDL Cholesterol 195 (H) <130 mg/dL    Patient Fasting > 8hrs? Unknown     Narrative    Cholesterol  Desirable:  <200 mg/dL    Triglycerides  Normal:  Less than 150 mg/dL  Borderline High:  150-199 mg/dL  High:  200-499 mg/dL  Very High:  Greater than or equal to 500 mg/dL    Direct Measure HDL  Female:  Greater than or equal to 50 mg/dL   Male:  Greater than or equal to 40 mg/dL    LDL Cholesterol  Desirable:  <100mg/dL  Above Desirable:  100-129 mg/dL   Borderline High:  130-159 mg/dL   High:  160-189 mg/dL   Very High:  >= 190 mg/dL    Non HDL Cholesterol  Desirable:  130 mg/dL  Above Desirable:  130-159 mg/dL  Borderline High:  160-189 mg/dL  High:  190-219 mg/dL  Very High:  Greater than or equal to 220 mg/dL   Comprehensive metabolic panel   Result Value Ref Range    Sodium 136 135 - 145 mmol/L      Comment:      Reference intervals for this test were updated on 09/26/2023 to more accurately reflect our healthy population. There may be differences in the flagging of prior results with similar values performed with this method. Interpretation of those prior results can be made in the context of the updated  reference intervals.     Potassium 4.0 3.4 - 5.3 mmol/L    Carbon Dioxide (CO2) 20 (L) 22 - 29 mmol/L    Anion Gap 15 7 - 15 mmol/L    Urea Nitrogen 14.5 6.0 - 20.0 mg/dL    Creatinine 0.93 0.67 - 1.17 mg/dL    GFR Estimate >90 >60 mL/min/1.73m2    Calcium 10.5 (H) 8.6 - 10.0 mg/dL    Chloride 101 98 - 107 mmol/L    Glucose 121 (H) 70 - 99 mg/dL    Alkaline Phosphatase 75 40 - 150 U/L      Comment:      Reference intervals for this test were updated on 11/14/2023 to more accurately reflect our healthy population. There may be differences in the flagging of prior results with similar values performed with this method. Interpretation of those prior results can be made in the context of the updated reference intervals.    AST 28 0 - 45 U/L      Comment:      Reference intervals for this test were updated on 6/12/2023 to more accurately reflect our healthy population. There may be differences in the flagging of prior results with similar values performed with this method. Interpretation of those prior results can be made in the context of the updated reference intervals.    ALT 40 0 - 70 U/L      Comment:      Reference intervals for this test were updated on 6/12/2023 to more accurately reflect our healthy population. There may be differences in the flagging of prior results with similar values performed with this method. Interpretation of those prior results can be made in the context of the updated reference intervals.      Protein Total 8.3 6.4 - 8.3 g/dL    Albumin 4.8 3.5 - 5.2 g/dL    Bilirubin Total 0.4 <=1.2 mg/dL   CBC with platelets and differential   Result Value Ref Range    WBC Count 6.6 4.0 - 11.0 10e3/uL    RBC Count 5.41 4.40 - 5.90 10e6/uL    Hemoglobin 16.7 13.3 - 17.7 g/dL    Hematocrit 49.2 40.0 - 53.0 %    MCV 91 78 - 100 fL    MCH 30.9 26.5 - 33.0 pg    MCHC 33.9 31.5 - 36.5 g/dL    RDW 12.6 10.0 - 15.0 %    Platelet Count 209 150 - 450 10e3/uL    % Neutrophils 45 %    % Lymphocytes 39 %    %  Monocytes 11 %    % Eosinophils 3 %    % Basophils 1 %    % Immature Granulocytes 1 %    NRBCs per 100 WBC 0 <1 /100    Absolute Neutrophils 3.1 1.6 - 8.3 10e3/uL    Absolute Lymphocytes 2.6 0.8 - 5.3 10e3/uL    Absolute Monocytes 0.7 0.0 - 1.3 10e3/uL    Absolute Eosinophils 0.2 0.0 - 0.7 10e3/uL    Absolute Basophils 0.1 0.0 - 0.2 10e3/uL    Absolute Immature Granulocytes 0.0 <=0.4 10e3/uL    Absolute NRBCs 0.0 10e3/uL       If you have any questions or concerns, please call the clinic at the number listed above.       Sincerely,      Jarad Salcido MD

## 2024-05-21 ENCOUNTER — OFFICE VISIT (OUTPATIENT)
Dept: INTERNAL MEDICINE | Facility: OTHER | Age: 44
End: 2024-05-21
Attending: INTERNAL MEDICINE

## 2024-05-21 VITALS
HEIGHT: 70 IN | TEMPERATURE: 98.6 F | DIASTOLIC BLOOD PRESSURE: 78 MMHG | HEART RATE: 70 BPM | BODY MASS INDEX: 29.92 KG/M2 | SYSTOLIC BLOOD PRESSURE: 136 MMHG | RESPIRATION RATE: 18 BRPM | WEIGHT: 209 LBS | OXYGEN SATURATION: 98 %

## 2024-05-21 DIAGNOSIS — E78.1 HYPERTRIGLYCERIDEMIA: ICD-10-CM

## 2024-05-21 DIAGNOSIS — J30.81 ALLERGIC RHINITIS DUE TO CAT HAIR: ICD-10-CM

## 2024-05-21 DIAGNOSIS — I10 BENIGN ESSENTIAL HYPERTENSION: Primary | ICD-10-CM

## 2024-05-21 DIAGNOSIS — Z79.899 OTHER LONG TERM (CURRENT) DRUG THERAPY: ICD-10-CM

## 2024-05-21 DIAGNOSIS — F41.9 ANXIETY: ICD-10-CM

## 2024-05-21 DIAGNOSIS — Z71.85 VACCINE COUNSELING: ICD-10-CM

## 2024-05-21 DIAGNOSIS — E53.8 VITAMIN B12 DEFICIENCY: ICD-10-CM

## 2024-05-21 DIAGNOSIS — E78.01 FAMILIAL HYPERCHOLESTEROLEMIA: ICD-10-CM

## 2024-05-21 DIAGNOSIS — T75.3XXD MOTION SICKNESS, SUBSEQUENT ENCOUNTER: ICD-10-CM

## 2024-05-21 DIAGNOSIS — F10.20 UNCOMPLICATED ALCOHOL DEPENDENCE (H): ICD-10-CM

## 2024-05-21 DIAGNOSIS — L65.9 HAIR LOSS: ICD-10-CM

## 2024-05-21 DIAGNOSIS — F90.2 ATTENTION DEFICIT HYPERACTIVITY DISORDER (ADHD), COMBINED TYPE: ICD-10-CM

## 2024-05-21 DIAGNOSIS — R12 HEARTBURN: ICD-10-CM

## 2024-05-21 DIAGNOSIS — F33.42 RECURRENT MAJOR DEPRESSIVE DISORDER, IN FULL REMISSION (H): ICD-10-CM

## 2024-05-21 DIAGNOSIS — R73.9 ELEVATED RANDOM BLOOD GLUCOSE LEVEL: ICD-10-CM

## 2024-05-21 DIAGNOSIS — E66.3 OVERWEIGHT (BMI 25.0-29.9): ICD-10-CM

## 2024-05-21 DIAGNOSIS — K52.9 INFLAMMATORY BOWEL DISEASE: ICD-10-CM

## 2024-05-21 DIAGNOSIS — Z00.00 ANNUAL PHYSICAL EXAM: ICD-10-CM

## 2024-05-21 PROCEDURE — 99396 PREV VISIT EST AGE 40-64: CPT | Performed by: INTERNAL MEDICINE

## 2024-05-21 PROCEDURE — 250N000011 HC RX IP 250 OP 636: Performed by: INTERNAL MEDICINE

## 2024-05-21 PROCEDURE — 99214 OFFICE O/P EST MOD 30 MIN: CPT | Mod: 25 | Performed by: INTERNAL MEDICINE

## 2024-05-21 PROCEDURE — 96372 THER/PROPH/DIAG INJ SC/IM: CPT | Performed by: INTERNAL MEDICINE

## 2024-05-21 RX ORDER — FINASTERIDE 1 MG/1
1 TABLET, FILM COATED ORAL DAILY
Qty: 90 TABLET | Refills: 4 | Status: SHIPPED | OUTPATIENT
Start: 2024-05-21

## 2024-05-21 RX ORDER — OMEGA-3-ACID ETHYL ESTERS 1 G/1
1-2 CAPSULE, LIQUID FILLED ORAL 4 TIMES DAILY
Qty: 400 CAPSULE | Refills: 4 | Status: SHIPPED | OUTPATIENT
Start: 2024-05-21

## 2024-05-21 RX ORDER — FENOFIBRATE 145 MG/1
145 TABLET, COATED ORAL DAILY
Qty: 90 TABLET | Refills: 4 | Status: SHIPPED | OUTPATIENT
Start: 2024-05-21

## 2024-05-21 RX ORDER — SCOLOPAMINE TRANSDERMAL SYSTEM 1 MG/1
1 PATCH, EXTENDED RELEASE TRANSDERMAL
Qty: 48 PATCH | Refills: 11 | Status: SHIPPED | OUTPATIENT
Start: 2024-05-21

## 2024-05-21 RX ORDER — LISDEXAMFETAMINE DIMESYLATE 30 MG/1
30 CAPSULE ORAL EVERY MORNING
Qty: 30 CAPSULE | Refills: 0 | Status: SHIPPED | OUTPATIENT
Start: 2024-07-16

## 2024-05-21 RX ORDER — CYANOCOBALAMIN 1000 UG/ML
1000 INJECTION, SOLUTION INTRAMUSCULAR; SUBCUTANEOUS ONCE
Status: COMPLETED | OUTPATIENT
Start: 2024-05-21 | End: 2024-05-21

## 2024-05-21 RX ORDER — LOSARTAN POTASSIUM 100 MG/1
100 TABLET ORAL DAILY
Qty: 90 TABLET | Refills: 4 | Status: SHIPPED | OUTPATIENT
Start: 2024-05-21

## 2024-05-21 RX ORDER — LORAZEPAM 0.5 MG/1
0.5 TABLET ORAL 2 TIMES DAILY PRN
Qty: 60 TABLET | Refills: 2 | Status: SHIPPED | OUTPATIENT
Start: 2024-05-21

## 2024-05-21 RX ORDER — ROSUVASTATIN CALCIUM 40 MG/1
40 TABLET, COATED ORAL DAILY
Qty: 90 TABLET | Refills: 4 | Status: SHIPPED | OUTPATIENT
Start: 2024-05-21

## 2024-05-21 RX ORDER — DIMENHYDRINATE 50 MG
4-6 TABLET ORAL DAILY
Qty: 400 CAPSULE | Refills: 4 | Status: SHIPPED | OUTPATIENT
Start: 2024-05-21

## 2024-05-21 RX ORDER — ESCITALOPRAM OXALATE 10 MG/1
10 TABLET ORAL DAILY
Qty: 90 TABLET | Refills: 4 | Status: SHIPPED | OUTPATIENT
Start: 2024-05-21

## 2024-05-21 RX ORDER — LISDEXAMFETAMINE DIMESYLATE 30 MG/1
30 CAPSULE ORAL EVERY MORNING
Qty: 30 CAPSULE | Refills: 0 | Status: SHIPPED | OUTPATIENT
Start: 2024-05-21

## 2024-05-21 RX ORDER — MESALAMINE 400 MG/1
800 CAPSULE, DELAYED RELEASE ORAL 4 TIMES DAILY PRN
Qty: 720 CAPSULE | Refills: 4 | Status: SHIPPED | OUTPATIENT
Start: 2024-05-21

## 2024-05-21 RX ORDER — AMLODIPINE BESYLATE 5 MG/1
5 TABLET ORAL DAILY
Qty: 90 TABLET | Refills: 4 | Status: SHIPPED | OUTPATIENT
Start: 2024-05-21

## 2024-05-21 RX ORDER — LISDEXAMFETAMINE DIMESYLATE 30 MG/1
30 CAPSULE ORAL EVERY MORNING
Qty: 30 CAPSULE | Refills: 0 | Status: SHIPPED | OUTPATIENT
Start: 2024-06-18

## 2024-05-21 RX ADMIN — CYANOCOBALAMIN 1000 MCG: 1000 INJECTION, SOLUTION INTRAMUSCULAR; SUBCUTANEOUS at 11:43

## 2024-05-21 ASSESSMENT — ENCOUNTER SYMPTOMS
NERVOUS/ANXIOUS: 1
BRUISES/BLEEDS EASILY: 0
FEVER: 0
ABDOMINAL PAIN: 0
ROS SKIN COMMENTS: HAIR LOSS
FATIGUE: 1
COUGH: 0
SHORTNESS OF BREATH: 0
CHILLS: 0
WOUND: 0
BACK PAIN: 0

## 2024-05-21 ASSESSMENT — PAIN SCALES - GENERAL: PAINLEVEL: NO PAIN (0)

## 2024-05-21 NOTE — PROGRESS NOTES
Assessment & Plan     ICD-10-CM    1. Benign essential hypertension  I10 amLODIPine (NORVASC) 5 MG tablet     losartan (COZAAR) 100 MG tablet      2. Familial hypercholesterolemia - at least heterozygote  E78.01 omega-3 acid ethyl esters (LOVAZA) 1 g capsule     rosuvastatin (CRESTOR) 40 MG tablet      3. Hypertriglyceridemia  E78.1 fenofibrate (TRICOR) 145 MG tablet     Flaxseed, Linseed, (FLAX SEED OIL) 1000 MG capsule      4. Recurrent major depressive disorder, in full remission (H24)  F33.42 amitriptyline (ELAVIL) 25 MG tablet     escitalopram (LEXAPRO) 10 MG tablet      5. Elevated random blood glucose level  R73.09       6. Attention deficit hyperactivity disorder (ADHD), combined type  F90.2 lisdexamfetamine (VYVANSE) 30 MG capsule     lisdexamfetamine (VYVANSE) 30 MG capsule     lisdexamfetamine (VYVANSE) 30 MG capsule     LORazepam (ATIVAN) 0.5 MG tablet      7. Uncomplicated alcohol dependence (H)  F10.20       8. Inflammatory bowel disease -- not definitive of UC vs chrons based on blood work in 2006  K52.9 mesalamine (DELZICOL) 400 MG DR capsule      9. Overweight (BMI 25.0-29.9)  E66.3       10. Allergic rhinitis due to cat hair  J30.81 budesonide (RINOCORT AQUA) 32 MCG/ACT nasal spray      11. Hair loss  L65.9 finasteride (PROPECIA) 1 MG tablet      12. Other long term (current) drug therapy - Adderall and Ativan  Z79.899 lisdexamfetamine (VYVANSE) 30 MG capsule     lisdexamfetamine (VYVANSE) 30 MG capsule     lisdexamfetamine (VYVANSE) 30 MG capsule     LORazepam (ATIVAN) 0.5 MG tablet      13. Anxiety  F41.9 LORazepam (ATIVAN) 0.5 MG tablet      14. Heartburn  R12 omeprazole (PRILOSEC) 20 MG DR capsule      15. Motion sickness, subsequent encounter  T75.3XXD scopolamine (TRANSDERM) 1 MG/3DAYS 72 hr patch      16. Vitamin B12 deficiency  E53.8 cyanocobalamin injection 1,000 mcg      17. Vaccine counseling  Z71.85       18. Annual physical exam  Z00.00          Patient presents for annual physical  examination as well as follow-up multiple issues.    Patient has ADHD with long-term use of Adderall.  Also has chronic anxiety.  Currently utilizing combination of Adderall and Ativan.  No opiate use.   website reviewed.  No abnormal findings noted.  Lexapro has been recently helpful and effective.  Taking regularly.  Needs refills as well.  Vyvanse refilled as noted.    Elevated random glucose, encouraged healthy diet, exercise.  Noted with previous lab work.  BMI is elevated showing overweight.    Inflammatory bowel disease, doing quite well with regular dosing of mesalamine.  Tolerating well.  Needs refills.    Cat allergy, still using nasal spray as needed.  Refill sent to pharmacy.    Hair loss, chronic.  Finasteride tablets have been recently helpful and effective.  Needs refills.    Heartburn, chronic.  Needs to take omeprazole regularly.  Finds helpful and beneficial.  Tolerating well.  Needs refills.    Motion sickness, chronic.  Still intermittently using scopolamine patches.  Needs refills.    Vitamin B12 deficiency.  Noted previous lab work.  Has had some issues with fatigue and malaise.  Would like to try B12 shot today.  Orders placed.    Vaccines are due.  Orders placed.    HYPERTENSION - Ongoing. Blood pressure is currently well controlled.  Medication side effects: None. Denies syncope or presyncope.  Continue current medications.   Medication list reviewed/updated. Refills completed as needed.      Familial HYPERLIPIDEMIA.  Ongoing. LDL is at goal: No. Triglycerides are at goal: No.  Hopefully lifestyle modifications will improve cholesterol levels, otherwise will consider additional medication dose adjustments or medication changes.  Medication side effects reported: None.   Continue current medications for now. Medication list reviewed/updated. Refills completed as needed.  Recent Labs   Lab Test 02/20/24  1115 11/16/23  1601   CHOL 232* 247*   HDL 37* 55   * 149*   TRIG 279* 214*       "  DEPRESSION - Patient has a long history of depression of moderate severity requiring medication for control.  Recent symptoms include: insomnia, anxiety.   Depression course: completely resolved.      Vaccine counseling completed.  Encourage routine / annual vaccinations.              BMI  Estimated body mass index is 29.99 kg/m  as calculated from the following:    Height as of this encounter: 1.778 m (5' 10\").    Weight as of this encounter: 94.8 kg (209 lb).           Return in about 3 months (around 8/14/2024) for ADHD medication refills.      Jraad Salcido MD  [facility]    Review of Systems   Constitutional:  Positive for fatigue. Negative for chills and fever.   HENT:  Negative for congestion.    Eyes:         Intermittent corneal / eye irritation / inflammation   Respiratory:  Negative for cough and shortness of breath.    Cardiovascular:  Negative for chest pain.   Gastrointestinal:  Negative for abdominal pain.   Genitourinary:  Positive for impotence (intermittent).   Musculoskeletal:  Negative for back pain.   Skin:  Negative for rash and wound.        Hair loss   Allergic/Immunologic: Positive for environmental allergies. Negative for immunocompromised state.   Neurological:  Negative for syncope.   Hematological:  Does not bruise/bleed easily.   Psychiatric/Behavioral:  The patient is nervous/anxious (improved with intermittent ativan).         ADHD - much better with adderall. Able to work full time.   All other systems reviewed and are negative.        Natty Hu is a 43 year old, presenting for the following health issues: Annual physical.  Recheck Medication        5/21/2024    10:41 AM   Additional Questions   Roomed by Luke Cerna LPN     Musculoskeletal Problem  Associated symptoms include fatigue. Pertinent negatives include no abdominal pain, chest pain, chills, congestion, coughing, fever or rash.   Healthy Habits:     Getting at least 3 servings of Calcium " "per day:  Yes    Bi-annual eye exam:  NO    Dental care twice a year:  Yes    Sleep apnea or symptoms of sleep apnea:  None    Diet:  Regular (no restrictions)    Frequency of exercise:  2-3 days/week    Duration of exercise:  15-30 minutes    Taking medications regularly:  Yes    Barriers to taking medications:  Problems remembering to take them    Medication side effects:  None    Additional concerns today:  Yes                     Objective    /78   Pulse 70   Temp 98.6  F (37  C)   Resp 18   Ht 1.778 m (5' 10\")   Wt 94.8 kg (209 lb)   SpO2 98%   BMI 29.99 kg/m    Body mass index is 29.99 kg/m .  Physical Exam  Constitutional:       General: He is not in acute distress.     Appearance: He is well-developed. He is not diaphoretic.   HENT:      Head: Normocephalic and atraumatic.   Eyes:      General: No scleral icterus.     Conjunctiva/sclera: Conjunctivae normal.   Cardiovascular:      Rate and Rhythm: Normal rate and regular rhythm.      Pulses: Normal pulses.   Pulmonary:      Effort: Pulmonary effort is normal.      Breath sounds: Normal breath sounds.   Abdominal:      Palpations: Abdomen is soft.      Tenderness: There is no abdominal tenderness.   Musculoskeletal:         General: No deformity.      Cervical back: Neck supple.      Right lower leg: No edema.      Left lower leg: No edema.   Lymphadenopathy:      Cervical: No cervical adenopathy.   Skin:     General: Skin is warm and dry.      Findings: No rash.   Neurological:      Mental Status: He is alert. Mental status is at baseline.   Psychiatric:         Mood and Affect: Mood normal.         Behavior: Behavior normal.                    Signed Electronically by: Jarad Salcido MD    "

## 2024-05-21 NOTE — PATIENT INSTRUCTIONS
B12 shot today.     Fasting labs at next clinic appointment.     Return in approximately 12 week(s), or sooner as needed for follow-up with Dr. Slacido.  - Vyvanse refills + cholesterol levels    Clinic : [phone]  Appointment line: [phone]

## 2024-05-21 NOTE — NURSING NOTE
"Chief Complaint   Patient presents with    Recheck Medication       Initial /78   Pulse 70   Temp 98.6  F (37  C)   Resp 18   Ht 1.778 m (5' 10\")   Wt 94.8 kg (209 lb)   SpO2 98%   BMI 29.99 kg/m   Estimated body mass index is 29.99 kg/m  as calculated from the following:    Height as of this encounter: 1.778 m (5' 10\").    Weight as of this encounter: 94.8 kg (209 lb).  Medication Review: complete    The next two questions are to help us understand your food security.  If you are feeling you need any assistance in this area, we have resources available to support you today.          2/20/2024   SDOH- Food Insecurity   Within the past 12 months, did you worry that your food would run out before you got money to buy more? N   Within the past 12 months, did the food you bought just not last and you didn t have money to get more? N         Health Care Directive:  Patient does not have a Health Care Directive or Living Will: Discussed advance care planning with patient; however, patient declined at this time.    Kaye Cerna LPN      "

## 2025-03-03 NOTE — MR AVS SNAPSHOT
After Visit Summary   4/4/2018    Kike Hess    MRN: [id number]           Patient Information     Date Of Birth          1980        Visit Information        Provider Department      4/4/2018 8:00 AM Jarad Salcido MD [facility]        Today's Diagnoses     Benign essential hypertension    -  1    Elevated random blood glucose level        FHx: coronary artery disease        Attention deficit hyperactivity disorder (ADHD), combined type        Migraine without aura and without status migrainosus, not intractable        Motion sickness, subsequent encounter        Inflammatory bowel disease -- not definitive of UC vs chrons based on blood work in 2006        Major depression in complete remission (H)        Controlled substance agreement signed 1-3-18        Heartburn        Need for Tdap vaccination          Care Instructions    Medications refilled. Glad everything is working.     Med list updated.     Labs today.     To help with weight loss and improve blood sugar control....    -- Try to avoid Carbohydrates as much as possible -- breads, pasta, baked goods, cakes, oatmeal, cold cereal, potatoes.   These are turned to sugar in one metabolic conversion, cause insulin secretion and increased fat deposition / weight gain.      -- Eat more lean meats, proteins, eggs, nuts.        There is no immunization history on file for this patient.     Pneumococcal Pneumonia vaccines (PCV 13 and PCV 23)     Pneumococcal Conjugate 13 - Valent Vaccine (One time only).     [id number] - Valent Vaccine -- Two doses (One before age 65 and One After)    -- repeat every 5 years in certain patient populations.     PCV 13should be given prior to PCV 23 -- THEN -- In eight weeks or more, PCV 23 can be given.   If the patient has already received PCV 23, they should notreceive PCV 13 for one year.     Tetanus vaccination today. Tdap.    Return in approximately 3 month(s), or sooner as  needed for follow-up with Dr. Salicdo.  - Med Refills    Clinic : [phone]  Appointment line: [phone]            Follow-ups after your visit        Follow-up notes from your care team     Return in about 3 months (around 2018) for - Med Refills.      Who to contact     If you have questions or need follow up information about today's clinic visit or your schedule please contact [facility] directly at [phone].  Normal or non-critical lab and imaging results will be communicated to you by MyChart, letter or phone within 4 business days after the clinic has received the results. If you do not hear from us within 7 days, please contact the clinic through [employer] or phone. If you have a critical or abnormal lab result, we will notify you by phone as soon as possible.  Submit refill requests through [employer] or call your pharmacy and they will forward the refill request to us. Please allow 3 business days for your refill to be completed.          Additional Information About Your Visit        [employer] Information     [employer] lets you send messages to your doctor, view your test results, renew your prescriptions, schedule appointments and more. To sign up, go to [email or web address] . Click on \"Log in\" on the left side of the screen, which will take you to the Welcome page. Then click on \"Sign up Now\" on the right side of the page.     You will be asked to enter the access code listed below, as well as some personal information. Please follow the directions to create your username and password.     Your access code is: [email or web address]  Expires: 2018  3:28 PM     Your access code will  in 90 days. If you need help or a new code, please call your [place] clinic or [phone].        Care EveryWhere ID     This is your Care EveryWhere ID. This could be used by other organizations to access your [place] medical records  [id number]        Your Vitals Were     Pulse " BMI (Body Mass Index)                76 29.41 kg/m2           Blood Pressure from Last 3 Encounters:   04/04/18 128/70   03/13/18 126/68   03/10/18 136/88    Weight from Last 3 Encounters:   04/04/18 205 lb (93 kg)   03/13/18 205 lb (93 kg)   03/10/18 215 lb 4.8 oz (97.7 kg)              We Performed the Following     CBC with platelets and differential     Comprehensive metabolic panel (BMP + Alb, Alk Phos, ALT, AST, Total. Bili, TP)     Hemoglobin A1c     Lipid Profile (Chol, Trig, HDL, LDL calc) - FASTING     TDAP VACCINE (BOOSTRIX)          Today's Medication Changes          These changes are accurate as of 4/4/18  8:48 AM.  If you have any questions, ask your nurse or doctor.               These medicines have changed or have updated prescriptions.        Dose/Directions    * amphetamine-dextroamphetamine 30 MG per 24 hr capsule   Commonly known as:  ADDERALL XR   This may have changed:  when to take this   Used for:  Attention deficit hyperactivity disorder (ADHD), combined type, Controlled substance agreement signed   Changed by:  Jarad Salcido MD        Dose:  30 mg   Take 1 capsule (30 mg) by mouth daily   Quantity:  30 capsule   Refills:  0       * amphetamine-dextroamphetamine 30 MG per 24 hr capsule   Commonly known as:  ADDERALL XR   This may have changed:  when to take this   Used for:  Attention deficit hyperactivity disorder (ADHD), combined type, Controlled substance agreement signed   Changed by:  Jarad Salcido MD        Dose:  30 mg   Start taking on:  5/5/2018   Take 1 capsule (30 mg) by mouth daily   Quantity:  30 capsule   Refills:  0       * amphetamine-dextroamphetamine 30 MG per 24 hr capsule   Commonly known as:  ADDERALL XR   This may have changed:  You were already taking a medication with the same name, and this prescription was added. Make sure you understand how and when to take each.   Used for:  Attention deficit hyperactivity disorder (ADHD), combined type, Controlled substance  agreement signed   Changed by:  Jarad Salcido MD        Dose:  30 mg   Start taking on:  6/5/2018   Take 1 capsule (30 mg) by mouth daily   Quantity:  30 capsule   Refills:  0       mesalamine 400 MG CR capsule   Commonly known as:  DELZICOL   This may have changed:    - when to take this  - reasons to take this  - additional instructions   Used for:  Inflammatory bowel disease   Changed by:  Jarad Salcido MD        Dose:  800 mg   Take 2 capsules (800 mg) by mouth 4 times daily as needed   Quantity:  240 capsule   Refills:  11       omeprazole 20 MG CR capsule   Commonly known as:  priLOSEC   This may have changed:    - when to take this  - additional instructions   Used for:  Heartburn   Changed by:  Jarad Salcido MD        Dose:  20 mg   Take 1 capsule (20 mg) by mouth daily 30-60 minutes prior to 1st meal of the day - limit use as able   Quantity:  90 capsule   Refills:  3       scopolamine 72 hr patch   Commonly known as:  TRANSDERM   This may have changed:    - how to take this  - when to take this   Used for:  Motion sickness, subsequent encounter   Changed by:  Jarad Salcido MD        Dose:  1 patch   Place 1 patch onto the skin every 72 hours on dry, clean, hairless skin every 72 hours. For Motion Sickness   Quantity:  10 patch   Refills:  11       SUMAtriptan 50 MG tablet   Commonly known as:  IMITREX   This may have changed:  how much to take   Used for:  Migraine without aura and without status migrainosus, not intractable   Changed by:  Jarad Salcido MD        Dose:  50 mg   Take 1 tablet (50 mg) by mouth every 2 hours as needed for migraine Max Dose 4 tablets per 24 hrs.   Quantity:  6 tablet   Refills:  3       * Notice:  This list has 3 medication(s) that are the same as other medications prescribed for you. Read the directions carefully, and ask your doctor or other care provider to review them with you.      Stop taking these medicines if you haven't already. Please contact your care team  if you have questions.     albuterol 108 (90 BASE) MCG/ACT Inhaler   Commonly known as:  PROAIR HFA/PROVENTIL HFA/VENTOLIN HFA   Stopped by:  Jarad Salcido MD                Where to get your medicines      These medications were sent to [facility], - [place] - [address]  [address], [facility] [address]     Phone:  [phone]     amitriptyline 25 MG tablet    amLODIPine 5 MG tablet    escitalopram 10 MG tablet    losartan 50 MG tablet    mesalamine 400 MG CR capsule    omeprazole 20 MG CR capsule    scopolamine 72 hr patch    SUMAtriptan 50 MG tablet         Some of these will need a paper prescription and others can be bought over the counter.  Ask your nurse if you have questions.     Bring a paper prescription for each of these medications     amphetamine-dextroamphetamine 30 MG per 24 hr capsule    amphetamine-dextroamphetamine 30 MG per 24 hr capsule    amphetamine-dextroamphetamine 30 MG per 24 hr capsule    LORazepam 0.5 MG tablet                Primary Care Provider Office Phone # Fax #    Jarad Salcido [phone]       [address]   [place] [address]        Equal Access to Services     [facility]: Hadii aad ku hadasho Soomaali, waaxda luqadaha, qaybta kaalmada adeegyada, pawel tesfaye. So [facility] [phone].    ATENCIÓN: Si habla español, tiene a raymundo disposición servicios gratuitos de asistencia lingüística. Llame al [phone].    We comply with applicable federal civil rights laws and [place] laws. We do not discriminate on the basis of race, color, national origin, age, disability, sex, sexual orientation, or gender identity.            Thank you!     Thank you for choosing [facility]  for your care. Our goal is always to provide you with excellent care. Hearing back from our patients is one way we can continue to improve our services. Please take a few minutes  to complete the written survey that you may receive in the mail after your visit with us. Thank you!             Your Updated Medication List - Protect others around you: Learn how to safely use, store and throw away your medicines at www.disposemymeds.org.          This list is accurate as of 4/4/18  8:48 AM.  Always use your most recent med list.                   Brand Name Dispense Instructions for use Diagnosis    amitriptyline 25 MG tablet    ELAVIL    180 tablet    Take 2 tablets (50 mg) by mouth At Bedtime    Major depression in complete remission (H)       amLODIPine 5 MG tablet    NORVASC    90 tablet    Take 1 tablet (5 mg) by mouth daily    Benign essential hypertension       * amphetamine-dextroamphetamine 30 MG per 24 hr capsule    ADDERALL XR    30 capsule    Take 1 capsule (30 mg) by mouth daily    Attention deficit hyperactivity disorder (ADHD), combined type, Controlled substance agreement signed       * amphetamine-dextroamphetamine 30 MG per 24 hr capsule   Start taking on:  5/5/2018    ADDERALL XR    30 capsule    Take 1 capsule (30 mg) by mouth daily    Attention deficit hyperactivity disorder (ADHD), combined type, Controlled substance agreement signed       * amphetamine-dextroamphetamine 30 MG per 24 hr capsule   Start taking on:  6/5/2018    ADDERALL XR    30 capsule    Take 1 capsule (30 mg) by mouth daily    Attention deficit hyperactivity disorder (ADHD), combined type, Controlled substance agreement signed       budesonide 32 MCG/ACT spray    RINOCORT AQUA     Spray 1 spray into both nostrils daily        escitalopram 10 MG tablet    LEXAPRO    90 tablet    Take 1 tablet (10 mg) by mouth daily    Major depression in complete remission (H)       LORazepam 0.5 MG tablet    ATIVAN    60 tablet    Take 1 tablet (0.5 mg) by mouth 2 times daily as needed    Attention deficit hyperactivity disorder (ADHD), combined type       losartan 50 MG tablet    COZAAR    90 tablet    Take 1 tablet (50  mg) by mouth daily    Benign essential hypertension       mesalamine 400 MG CR capsule    DELZICOL    240 capsule    Take 2 capsules (800 mg) by mouth 4 times daily as needed    Inflammatory bowel disease       methylPREDNISolone 4 MG tablet    MEDROL     TAKE AS DIRECTED FOR 10 DAYS FOR ULCERATIVE COLITIS FLAIR. REPEAT EVERY 4 WEEKS IF NEEDED. MAXIMUM OF 40 TABLETS MONTHLY        omeprazole 20 MG CR capsule    priLOSEC    90 capsule    Take 1 capsule (20 mg) by mouth daily 30-60 minutes prior to 1st meal of the day - limit use as able    Heartburn       scopolamine 72 hr patch    TRANSDERM    10 patch    Place 1 patch onto the skin every 72 hours on dry, clean, hairless skin every 72 hours. For Motion Sickness    Motion sickness, subsequent encounter       SUMAtriptan 50 MG tablet    IMITREX    6 tablet    Take 1 tablet (50 mg) by mouth every 2 hours as needed for migraine Max Dose 4 tablets per 24 hrs.    Migraine without aura and without status migrainosus, not intractable       * Notice:  This list has 3 medication(s) that are the same as other medications prescribed for you. Read the directions carefully, and ask your doctor or other care provider to review them with you.

## 2025-04-21 ENCOUNTER — PATIENT OUTREACH (OUTPATIENT)
Dept: CARE COORDINATION | Facility: CLINIC | Age: 45
End: 2025-04-21

## (undated) RX ORDER — CYANOCOBALAMIN 1000 UG/ML
INJECTION, SOLUTION INTRAMUSCULAR; SUBCUTANEOUS
Status: DISPENSED
Start: 2024-05-21